# Patient Record
Sex: FEMALE | Race: WHITE | NOT HISPANIC OR LATINO | Employment: OTHER | URBAN - METROPOLITAN AREA
[De-identification: names, ages, dates, MRNs, and addresses within clinical notes are randomized per-mention and may not be internally consistent; named-entity substitution may affect disease eponyms.]

---

## 2017-02-20 ENCOUNTER — HISTORICAL (OUTPATIENT)
Dept: RADIOLOGY | Facility: HOSPITAL | Age: 77
End: 2017-02-20

## 2017-10-20 LAB
CHOLEST SERPL-MCNC: 295 MG/DL
HDLC SERPL-MCNC: 138 MG/DL (ref 35–60)
LDLC SERPL CALC-MCNC: 118 MG/DL
TRIGL SERPL-MCNC: 67 MG/DL (ref 30–150)

## 2017-11-08 LAB
BUN SERPL-MCNC: 13 MG/DL (ref 7–18)
CALCIUM SERPL-MCNC: 9.5 MG/DL (ref 9–10.9)
CHLORIDE SERPL-SCNC: 98 MMOL/L (ref 98–116)
CO2 SERPL-SCNC: 30 MMOL/L (ref 15–28)
CREAT SERPL-MCNC: 0.67 MG/DL (ref 0.6–1.3)
GLUCOSE SERPL-MCNC: 111 MG/DL (ref 74–106)
POTASSIUM SERPL-SCNC: 4 MMOL/L (ref 3.5–5.1)
SODIUM SERPL-SCNC: 131 MEQ/L (ref 131–145)

## 2018-03-05 ENCOUNTER — HISTORICAL (OUTPATIENT)
Dept: ADMINISTRATIVE | Facility: HOSPITAL | Age: 78
End: 2018-03-05

## 2018-04-20 ENCOUNTER — HISTORICAL (OUTPATIENT)
Dept: ADMINISTRATIVE | Facility: HOSPITAL | Age: 78
End: 2018-04-20

## 2018-04-20 LAB
APPEARANCE, UA: CLEAR
BACTERIA #/AREA URNS AUTO: ABNORMAL /HPF
BILIRUB UR QL STRIP: NEGATIVE MG/DL
COLOR UR: YELLOW
GLUCOSE (UA): NEGATIVE MG/DL
HGB UR QL STRIP: ABNORMAL UNIT/L
KETONES UR QL STRIP: NEGATIVE MG/DL
LEUKOCYTE ESTERASE UR QL STRIP: ABNORMAL UNIT/L
NITRITE UR QL STRIP.AUTO: NEGATIVE
PH UR STRIP: 7 [PH]
PROT UR QL STRIP: NEGATIVE MG/DL
RBC #/AREA URNS HPF: ABNORMAL /HPF
SP GR UR STRIP: 1.01
SQUAMOUS EPITHELIAL, UA: ABNORMAL /LPF
UROBILINOGEN UR STRIP-ACNC: 0.2 MG/DL
WBC #/AREA URNS AUTO: ABNORMAL /[HPF]

## 2018-10-26 ENCOUNTER — HISTORICAL (OUTPATIENT)
Dept: RADIOLOGY | Facility: HOSPITAL | Age: 78
End: 2018-10-26

## 2018-10-30 ENCOUNTER — HISTORICAL (OUTPATIENT)
Dept: LAB | Facility: HOSPITAL | Age: 78
End: 2018-10-30

## 2018-10-30 ENCOUNTER — HISTORICAL (OUTPATIENT)
Dept: ADMINISTRATIVE | Facility: HOSPITAL | Age: 78
End: 2018-10-30

## 2018-10-30 LAB
ABS NEUT (OLG): 4
ALBUMIN SERPL-MCNC: 4.3 GM/DL (ref 3.4–5)
ALBUMIN/GLOB SERPL: 1.26 {RATIO} (ref 1.5–2.5)
ALP SERPL-CCNC: 83 UNIT/L (ref 38–126)
ALT SERPL-CCNC: 17 UNIT/L (ref 7–52)
APPEARANCE, UA: ABNORMAL
AST SERPL-CCNC: 21 UNIT/L (ref 15–37)
BACTERIA #/AREA URNS AUTO: ABNORMAL /HPF
BILIRUB SERPL-MCNC: 0.7 MG/DL (ref 0.2–1)
BILIRUB UR QL STRIP: NEGATIVE MG/DL
BILIRUBIN DIRECT+TOT PNL SERPL-MCNC: 0.2 MG/DL (ref 0–0.5)
BILIRUBIN DIRECT+TOT PNL SERPL-MCNC: 0.5 MG/DL
BUN SERPL-MCNC: 13 MG/DL (ref 7–18)
CALCIUM SERPL-MCNC: 10 MG/DL (ref 8.5–10)
CHLORIDE SERPL-SCNC: 99 MMOL/L (ref 98–107)
CHOLEST SERPL-MCNC: 253 MG/DL (ref 0–200)
CHOLEST/HDLC SERPL: 2.3 {RATIO}
CO2 SERPL-SCNC: 30 MMOL/L (ref 21–32)
COLOR UR: YELLOW
CREAT SERPL-MCNC: 0.77 MG/DL (ref 0.6–1.3)
ERYTHROCYTE [DISTWIDTH] IN BLOOD BY AUTOMATED COUNT: 15.5 % (ref 11.5–17)
GLOBULIN SER-MCNC: 3.4 GM/DL (ref 1.2–3)
GLUCOSE (UA): NEGATIVE MG/DL
GLUCOSE SERPL-MCNC: 114 MG/DL (ref 74–106)
HCT VFR BLD AUTO: 41.7 % (ref 37–47)
HDLC SERPL-MCNC: 111 MG/DL (ref 35–60)
HGB BLD-MCNC: 13.3 GM/DL (ref 12–16)
HGB UR QL STRIP: NEGATIVE UNIT/L
KETONES UR QL STRIP: NEGATIVE MG/DL
LDLC SERPL CALC-MCNC: 110 MG/DL (ref 0–129)
LEUKOCYTE ESTERASE UR QL STRIP: ABNORMAL UNIT/L
LYMPHOCYTES # BLD AUTO: 2.2 X10(3)/MCL (ref 0.6–3.4)
LYMPHOCYTES NFR BLD AUTO: 30.8 % (ref 13–40)
MCH RBC QN AUTO: 29.2 PG (ref 27–31.2)
MCHC RBC AUTO-ENTMCNC: 32 GM/DL (ref 32–36)
MCV RBC AUTO: 92 FL (ref 80–94)
MONOCYTES # BLD AUTO: 1.1 X10(3)/MCL (ref 0–1.8)
MONOCYTES NFR BLD AUTO: 14.6 % (ref 0.1–24)
NEUTROPHILS NFR BLD AUTO: 54.6 % (ref 47–80)
NITRITE UR QL STRIP.AUTO: NEGATIVE
PH UR STRIP: 7 [PH]
PLATELET # BLD AUTO: 245 X10(3)/MCL (ref 130–400)
PMV BLD AUTO: 9.9 FL
POTASSIUM SERPL-SCNC: 4.5 MMOL/L (ref 3.5–5.1)
PROT SERPL-MCNC: 7.7 GM/DL (ref 6.4–8.2)
PROT UR QL STRIP: ABNORMAL MG/DL
RBC # BLD AUTO: 4.55 X10(6)/MCL (ref 4.2–5.4)
RBC #/AREA URNS HPF: ABNORMAL /HPF
SODIUM SERPL-SCNC: 134 MMOL/L (ref 136–145)
SP GR UR STRIP: 1.02
SQUAMOUS EPITHELIAL, UA: ABNORMAL /LPF
TRIGL SERPL-MCNC: 77 MG/DL (ref 30–150)
TSH SERPL-ACNC: 1.94 MIU/ML (ref 0.35–4.94)
UROBILINOGEN UR STRIP-ACNC: 0.2 MG/DL
VLDLC SERPL CALC-MCNC: 15.4 MG/DL
WBC # SPEC AUTO: 7.3 X10(3)/MCL (ref 4.5–11.5)
WBC #/AREA URNS AUTO: ABNORMAL /[HPF]

## 2018-11-01 LAB — FINAL CULTURE: NORMAL

## 2018-11-08 ENCOUNTER — HISTORICAL (OUTPATIENT)
Dept: ADMINISTRATIVE | Facility: HOSPITAL | Age: 78
End: 2018-11-08

## 2018-11-08 ENCOUNTER — HISTORICAL (OUTPATIENT)
Dept: LAB | Facility: HOSPITAL | Age: 78
End: 2018-11-08

## 2018-11-08 LAB
ABS NEUT (OLG): 3.7 X10(3)/MCL (ref 2.1–9.2)
APTT PPP: 27.6 SECOND(S) (ref 24.8–36.9)
BUN SERPL-MCNC: 14 MG/DL (ref 7–18)
CALCIUM SERPL-MCNC: 9.4 MG/DL (ref 8.5–10)
CHLORIDE SERPL-SCNC: 98 MMOL/L (ref 98–107)
CO2 SERPL-SCNC: 29 MMOL/L (ref 21–32)
CREAT SERPL-MCNC: 0.73 MG/DL (ref 0.6–1.3)
CREAT/UREA NIT SERPL: 19.2
ERYTHROCYTE [DISTWIDTH] IN BLOOD BY AUTOMATED COUNT: 15.4 % (ref 11.5–17)
GLUCOSE SERPL-MCNC: 108 MG/DL (ref 74–106)
HCT VFR BLD AUTO: 40.8 % (ref 37–47)
HGB BLD-MCNC: 13 GM/DL (ref 12–16)
INR PPP: 0.88 (ref 0–1.27)
LYMPHOCYTES # BLD AUTO: 2.1 X10(3)/MCL (ref 0.6–3.4)
LYMPHOCYTES NFR BLD AUTO: 32.8 % (ref 13–40)
MCH RBC QN AUTO: 29.5 PG (ref 27–31.2)
MCHC RBC AUTO-ENTMCNC: 32 GM/DL (ref 32–36)
MCV RBC AUTO: 92 FL (ref 80–94)
MONOCYTES # BLD AUTO: 0.7 X10(3)/MCL (ref 0.1–1.3)
MONOCYTES NFR BLD AUTO: 10.8 % (ref 0.1–24)
NEUTROPHILS NFR BLD AUTO: 56.4 % (ref 47–80)
PLATELET # BLD AUTO: 240 X10(3)/MCL (ref 130–400)
PLATELET # BLD AUTO: 243 X10(3)/MCL (ref 130–400)
PMV BLD AUTO: 9.9 FL (ref 9.4–12.4)
POTASSIUM SERPL-SCNC: 4.3 MMOL/L (ref 3.5–5.1)
PROTHROMBIN TIME: 12.2 SECOND(S) (ref 12.2–14.7)
RBC # BLD AUTO: 4.41 X10(6)/MCL (ref 4.2–5.4)
SODIUM SERPL-SCNC: 132 MMOL/L (ref 136–145)
WBC # SPEC AUTO: 6.5 X10(3)/MCL (ref 4.5–11.5)

## 2019-05-09 ENCOUNTER — HISTORICAL (OUTPATIENT)
Dept: ADMINISTRATIVE | Facility: HOSPITAL | Age: 79
End: 2019-05-09

## 2019-05-09 LAB
APPEARANCE, UA: ABNORMAL
BACTERIA #/AREA URNS AUTO: ABNORMAL /HPF
BILIRUB UR QL STRIP: ABNORMAL MG/DL
BUN SERPL-MCNC: 16 MG/DL (ref 7–18)
CALCIUM SERPL-MCNC: 10 MG/DL (ref 8.5–10)
CHLORIDE SERPL-SCNC: 101 MMOL/L (ref 98–107)
CO2 SERPL-SCNC: 30 MMOL/L (ref 21–32)
COLOR UR: YELLOW
CREAT SERPL-MCNC: 0.84 MG/DL (ref 0.6–1.3)
CREAT/UREA NIT SERPL: 19
GLUCOSE (UA): NEGATIVE MG/DL
GLUCOSE SERPL-MCNC: 114 MG/DL (ref 74–106)
HGB UR QL STRIP: NEGATIVE UNIT/L
KETONES UR QL STRIP: ABNORMAL MG/DL
LEUKOCYTE ESTERASE UR QL STRIP: ABNORMAL UNIT/L
NITRITE UR QL STRIP.AUTO: NEGATIVE
PH UR STRIP: 7.5 [PH]
POTASSIUM SERPL-SCNC: 4.5 MMOL/L (ref 3.5–5.1)
PROT UR QL STRIP: ABNORMAL MG/DL
RBC #/AREA URNS HPF: ABNORMAL /HPF
SODIUM SERPL-SCNC: 137 MMOL/L (ref 136–145)
SP GR UR STRIP: 1.01
SQUAMOUS EPITHELIAL, UA: ABNORMAL /LPF
UROBILINOGEN UR STRIP-ACNC: 0.2 MG/DL
WBC #/AREA URNS AUTO: ABNORMAL /[HPF]

## 2019-11-12 ENCOUNTER — HISTORICAL (OUTPATIENT)
Dept: ADMINISTRATIVE | Facility: HOSPITAL | Age: 79
End: 2019-11-12

## 2020-03-19 ENCOUNTER — HISTORICAL (OUTPATIENT)
Dept: ADMINISTRATIVE | Facility: HOSPITAL | Age: 80
End: 2020-03-19

## 2020-03-19 LAB
ABS NEUT (OLG): 3.5 X10(3)/MCL (ref 2.1–9.2)
ALBUMIN SERPL-MCNC: 4.4 GM/DL (ref 3.4–5)
ALBUMIN/GLOB SERPL: 1.52 {RATIO} (ref 1.5–2.5)
ALP SERPL-CCNC: 74 UNIT/L (ref 38–126)
ALT SERPL-CCNC: 20 UNIT/L (ref 7–52)
APPEARANCE, UA: CLEAR
AST SERPL-CCNC: 24 UNIT/L (ref 15–37)
BACTERIA #/AREA URNS AUTO: NORMAL /HPF
BILIRUB SERPL-MCNC: 0.6 MG/DL (ref 0.2–1)
BILIRUB UR QL STRIP: NEGATIVE MG/DL
BILIRUBIN DIRECT+TOT PNL SERPL-MCNC: 0.1 MG/DL (ref 0–0.5)
BILIRUBIN DIRECT+TOT PNL SERPL-MCNC: 0.5 MG/DL
BUN SERPL-MCNC: 16 MG/DL (ref 7–18)
CALCIUM SERPL-MCNC: 9.8 MG/DL (ref 8.5–10)
CHLORIDE SERPL-SCNC: 97 MMOL/L (ref 98–107)
CHOLEST SERPL-MCNC: 280 MG/DL (ref 0–200)
CHOLEST/HDLC SERPL: 2.4 {RATIO}
CO2 SERPL-SCNC: 31 MMOL/L (ref 21–32)
COLOR UR: YELLOW
CREAT SERPL-MCNC: 0.76 MG/DL (ref 0.6–1.3)
ERYTHROCYTE [DISTWIDTH] IN BLOOD BY AUTOMATED COUNT: 15.1 % (ref 11.5–17)
GLOBULIN SER-MCNC: 2.9 GM/DL (ref 1.2–3)
GLUCOSE (UA): NEGATIVE MG/DL
GLUCOSE SERPL-MCNC: 110 MG/DL (ref 74–106)
HCT VFR BLD AUTO: 38.3 % (ref 37–47)
HDLC SERPL-MCNC: 116 MG/DL (ref 35–60)
HGB BLD-MCNC: 12.1 GM/DL (ref 12–16)
HGB UR QL STRIP: NEGATIVE UNIT/L
KETONES UR QL STRIP: NEGATIVE MG/DL
LDLC SERPL CALC-MCNC: 133 MG/DL (ref 0–129)
LEUKOCYTE ESTERASE UR QL STRIP: NORMAL UNIT/L
LYMPHOCYTES # BLD AUTO: 2.1 X10(3)/MCL (ref 0.6–3.4)
LYMPHOCYTES NFR BLD AUTO: 31.4 % (ref 13–40)
MCH RBC QN AUTO: 26.5 PG (ref 27–31.2)
MCHC RBC AUTO-ENTMCNC: 32 GM/DL (ref 32–36)
MCV RBC AUTO: 84 FL (ref 80–94)
MONOCYTES # BLD AUTO: 1 X10(3)/MCL (ref 0.1–1.3)
MONOCYTES NFR BLD AUTO: 14.7 % (ref 0.1–24)
NEUTROPHILS NFR BLD AUTO: 53.9 % (ref 47–80)
NITRITE UR QL STRIP.AUTO: NEGATIVE
PH UR STRIP: 7 [PH]
PLATELET # BLD AUTO: 247 X10(3)/MCL (ref 130–400)
PMV BLD AUTO: 9.9 FL (ref 9.4–12.4)
POTASSIUM SERPL-SCNC: 4.6 MMOL/L (ref 3.5–5.1)
PROT SERPL-MCNC: 7.3 GM/DL (ref 6.4–8.2)
PROT UR QL STRIP: NEGATIVE MG/DL
RBC # BLD AUTO: 4.56 X10(6)/MCL (ref 4.2–5.4)
RBC #/AREA URNS HPF: NORMAL /HPF
SODIUM SERPL-SCNC: 134 MMOL/L (ref 136–145)
SP GR UR STRIP: 1.02
SQUAMOUS EPITHELIAL, UA: NORMAL /LPF
TRIGL SERPL-MCNC: 65 MG/DL (ref 30–150)
TSH SERPL-ACNC: 1.65 MIU/ML (ref 0.35–4.94)
UROBILINOGEN UR STRIP-ACNC: 0.2 MG/DL
VLDLC SERPL CALC-MCNC: 13 MG/DL
WBC # SPEC AUTO: 6.6 X10(3)/MCL (ref 4.5–11.5)
WBC #/AREA URNS AUTO: NORMAL /[HPF]

## 2020-05-13 ENCOUNTER — HISTORICAL (OUTPATIENT)
Dept: ADMINISTRATIVE | Facility: HOSPITAL | Age: 80
End: 2020-05-13

## 2020-06-24 ENCOUNTER — HISTORICAL (OUTPATIENT)
Dept: ADMINISTRATIVE | Facility: HOSPITAL | Age: 80
End: 2020-06-24

## 2020-09-21 ENCOUNTER — HISTORICAL (OUTPATIENT)
Dept: ADMINISTRATIVE | Facility: HOSPITAL | Age: 80
End: 2020-09-21

## 2020-09-23 ENCOUNTER — HISTORICAL (OUTPATIENT)
Dept: ADMINISTRATIVE | Facility: HOSPITAL | Age: 80
End: 2020-09-23

## 2020-09-23 LAB
APPEARANCE, UA: CLEAR
BACTERIA #/AREA URNS AUTO: ABNORMAL /HPF
BILIRUB UR QL STRIP: ABNORMAL MG/DL
BUN SERPL-MCNC: 12 MG/DL (ref 7–18)
CALCIUM SERPL-MCNC: 9.7 MG/DL (ref 8.5–10)
CHLORIDE SERPL-SCNC: 96 MMOL/L (ref 98–107)
CO2 SERPL-SCNC: 28 MMOL/L (ref 21–32)
COLOR UR: YELLOW
CREAT SERPL-MCNC: 0.69 MG/DL (ref 0.6–1.3)
CREAT/UREA NIT SERPL: 17.4
GLUCOSE (UA): NEGATIVE MG/DL
GLUCOSE SERPL-MCNC: 94 MG/DL (ref 74–106)
HGB UR QL STRIP: NEGATIVE UNIT/L
KETONES UR QL STRIP: ABNORMAL MG/DL
LEUKOCYTE ESTERASE UR QL STRIP: NEGATIVE UNIT/L
NITRITE UR QL STRIP.AUTO: NEGATIVE
PH UR STRIP: 8.5 [PH]
POTASSIUM SERPL-SCNC: 4.2 MMOL/L (ref 3.5–5.1)
PROT UR QL STRIP: NEGATIVE MG/DL
RBC #/AREA URNS HPF: ABNORMAL /HPF
SODIUM SERPL-SCNC: 133 MMOL/L (ref 136–145)
SP GR UR STRIP: 1.02
SQUAMOUS EPITHELIAL, UA: ABNORMAL /LPF
UROBILINOGEN UR STRIP-ACNC: 0.2 MG/DL
WBC #/AREA URNS AUTO: ABNORMAL /[HPF]

## 2020-11-11 ENCOUNTER — HISTORICAL (OUTPATIENT)
Dept: ADMINISTRATIVE | Facility: HOSPITAL | Age: 80
End: 2020-11-11

## 2021-03-01 ENCOUNTER — HISTORICAL (OUTPATIENT)
Dept: ADMINISTRATIVE | Facility: HOSPITAL | Age: 81
End: 2021-03-01

## 2021-03-23 ENCOUNTER — HISTORICAL (OUTPATIENT)
Dept: ADMINISTRATIVE | Facility: HOSPITAL | Age: 81
End: 2021-03-23

## 2021-03-23 LAB
ABS NEUT (OLG): 3.5 X10(3)/MCL (ref 2.1–9.2)
ALBUMIN SERPL-MCNC: 4.6 GM/DL (ref 3.4–5)
ALBUMIN/GLOB SERPL: 1.44 {RATIO} (ref 1.5–2.5)
ALP SERPL-CCNC: 80 UNIT/L (ref 38–126)
ALT SERPL-CCNC: 26 UNIT/L (ref 7–52)
APPEARANCE, UA: CLEAR
AST SERPL-CCNC: 31 UNIT/L (ref 15–37)
BACTERIA #/AREA URNS AUTO: NORMAL /HPF
BILIRUB SERPL-MCNC: 0.7 MG/DL (ref 0.2–1)
BILIRUB UR QL STRIP: NEGATIVE MG/DL
BILIRUBIN DIRECT+TOT PNL SERPL-MCNC: 0.2 MG/DL (ref 0–0.5)
BILIRUBIN DIRECT+TOT PNL SERPL-MCNC: 0.5 MG/DL
BUN SERPL-MCNC: 15 MG/DL (ref 7–18)
CALCIUM SERPL-MCNC: 10.4 MG/DL (ref 8.5–10)
CHLORIDE SERPL-SCNC: 98 MMOL/L (ref 98–107)
CHOLEST SERPL-MCNC: 266 MG/DL (ref 0–200)
CHOLEST/HDLC SERPL: 2.2 {RATIO}
CO2 SERPL-SCNC: 28 MMOL/L (ref 21–32)
COLOR UR: YELLOW
CREAT SERPL-MCNC: 0.7 MG/DL (ref 0.6–1.3)
ERYTHROCYTE [DISTWIDTH] IN BLOOD BY AUTOMATED COUNT: 14.1 % (ref 11.5–17)
GLOBULIN SER-MCNC: 3.2 GM/DL (ref 1.2–3)
GLUCOSE (UA): NEGATIVE MG/DL
GLUCOSE SERPL-MCNC: 106 MG/DL (ref 74–106)
HCT VFR BLD AUTO: 39.5 % (ref 37–47)
HDLC SERPL-MCNC: 122 MG/DL (ref 35–60)
HGB BLD-MCNC: 13.2 GM/DL (ref 12–16)
HGB UR QL STRIP: NEGATIVE UNIT/L
KETONES UR QL STRIP: NEGATIVE MG/DL
LDLC SERPL CALC-MCNC: 107 MG/DL (ref 0–129)
LEUKOCYTE ESTERASE UR QL STRIP: NEGATIVE UNIT/L
LYMPHOCYTES # BLD AUTO: 2.3 X10(3)/MCL (ref 0.6–3.4)
LYMPHOCYTES NFR BLD AUTO: 35.3 % (ref 13–40)
MCH RBC QN AUTO: 30.7 PG (ref 27–31.2)
MCHC RBC AUTO-ENTMCNC: 33 GM/DL (ref 32–36)
MCV RBC AUTO: 92 FL (ref 80–94)
MONOCYTES # BLD AUTO: 0.8 X10(3)/MCL (ref 0.1–1.3)
MONOCYTES NFR BLD AUTO: 11.8 % (ref 0.1–24)
NEUTROPHILS NFR BLD AUTO: 52.9 % (ref 47–80)
NITRITE UR QL STRIP.AUTO: NEGATIVE
PH UR STRIP: 7.5 [PH]
PLATELET # BLD AUTO: 213 X10(3)/MCL (ref 130–400)
PMV BLD AUTO: 10.2 FL (ref 9.4–12.4)
POTASSIUM SERPL-SCNC: 4.8 MMOL/L (ref 3.5–5.1)
PROT SERPL-MCNC: 7.8 GM/DL (ref 6.4–8.2)
PROT UR QL STRIP: NEGATIVE MG/DL
RBC # BLD AUTO: 4.3 X10(6)/MCL (ref 4.2–5.4)
RBC #/AREA URNS HPF: NORMAL /HPF
SODIUM SERPL-SCNC: 135 MMOL/L (ref 136–145)
SP GR UR STRIP: 1.02
SQUAMOUS EPITHELIAL, UA: NORMAL /LPF
TRIGL SERPL-MCNC: 94 MG/DL (ref 30–150)
TSH SERPL-ACNC: 1.91 MIU/ML (ref 0.35–4.94)
UROBILINOGEN UR STRIP-ACNC: 0.2 MG/DL
VLDLC SERPL CALC-MCNC: 18.8 MG/DL
WBC # SPEC AUTO: 6.6 X10(3)/MCL (ref 4.5–11.5)
WBC #/AREA URNS AUTO: NORMAL /[HPF]

## 2021-09-21 ENCOUNTER — HISTORICAL (OUTPATIENT)
Dept: ADMINISTRATIVE | Facility: HOSPITAL | Age: 81
End: 2021-09-21

## 2021-09-21 LAB
APPEARANCE, UA: CLEAR
BACTERIA #/AREA URNS AUTO: ABNORMAL /HPF
BILIRUB UR QL STRIP: ABNORMAL MG/DL
BUN SERPL-MCNC: 10 MG/DL (ref 7–18)
CALCIUM SERPL-MCNC: 9.6 MG/DL (ref 8.5–10)
CHLORIDE SERPL-SCNC: 98 MMOL/L (ref 98–107)
CO2 SERPL-SCNC: 30 MMOL/L (ref 21–32)
COLOR UR: YELLOW
CREAT SERPL-MCNC: 0.64 MG/DL (ref 0.6–1.3)
CREAT/UREA NIT SERPL: 15.6
EST CREAT CLEARANCE SER (OHS): 92.29 ML/MIN
GLUCOSE (UA): NEGATIVE MG/DL
GLUCOSE SERPL-MCNC: 108 MG/DL (ref 74–106)
HGB UR QL STRIP: NEGATIVE UNIT/L
KETONES UR QL STRIP: ABNORMAL MG/DL
LEUKOCYTE ESTERASE UR QL STRIP: NEGATIVE UNIT/L
NITRITE UR QL STRIP.AUTO: NEGATIVE
PH UR STRIP: 7.5 [PH]
POTASSIUM SERPL-SCNC: 5 MMOL/L (ref 3.5–5.1)
PROT UR QL STRIP: ABNORMAL MG/DL
RBC #/AREA URNS HPF: ABNORMAL /HPF
SODIUM SERPL-SCNC: 136 MMOL/L (ref 136–145)
SP GR UR STRIP: 1.02
SQUAMOUS EPITHELIAL, UA: ABNORMAL /LPF
UROBILINOGEN UR STRIP-ACNC: 0.2 MG/DL
WBC #/AREA URNS AUTO: ABNORMAL /[HPF]

## 2022-04-10 ENCOUNTER — HISTORICAL (OUTPATIENT)
Dept: ADMINISTRATIVE | Facility: HOSPITAL | Age: 82
End: 2022-04-10

## 2022-04-26 VITALS
HEIGHT: 62 IN | WEIGHT: 177.94 LBS | SYSTOLIC BLOOD PRESSURE: 142 MMHG | BODY MASS INDEX: 32.74 KG/M2 | DIASTOLIC BLOOD PRESSURE: 82 MMHG

## 2022-05-02 NOTE — HISTORICAL [facility] CERNER
This is a historical note converted from [employer]. Formatting and pictures may have been removed.  Please reference [facility] for original formatting and attached multimedia. Chief Complaint  wellness  History of Present Illness  The patient is a 80 year old white female here today for a complete Wellness physical.? The patient?has?no?no acute complaints today. The patient also carries a diagnosis of-multi-comorbid see list, which is assessed today.? Exercise is reported as minimal and is doing bike for lower body and some upper body exercises along with walking, no cp or SOB at this time with activity.?? Diet modifications are reported as?low-sodium/low-cholesterol.?? Previous documented weight is recorded? 181.  She is also?here in clinic for evaluation of hypertension.? The patient reports home blood pressures of 130s over 80s.? The patient reports 100% compliance with medication.? The patient reports no side effects with medication use.? The patient reports following a low-sodium diet.? The patient reports some cardiovascular exercise implementation.? There is no chest pain or shortness of breath reported with exercise.  She reports 100% compliance with her medications with no side effects?for her?osteoporosis and hyperlipidemia. ?Following low-cholesterol diet.? Attempting weightbearing exercise for bone health. She is still cautious with exercise as she had several major orthopedic surgeries last year, recovered well but still cautious.  ?   Dr. Saavedra- Ophthalmologist  Dr. Britt- Tank/C[place]  Review of Systems  Constitutional:?no weight gain,?no weight loss,?no fatigue,?no fever,?no chills,?no weakness,?no trouble sleeping.  Eyes:?no vision loss/changes,?no glasses or contacts,?no pain,?no redness,?no blurry or double vision,?no flashing lights,?no specks,?no glaucoma,?no cataracts.  Last eye exam:?within last year  Head:?no headache,?no head injury,?no neck pain.?  Neck:??no lumps,?no swollen glands,?no  stiffness.  Ears:?no decreased hearing,?no ringing,?no earache,?no drainage.?  Nose:?no stuffiness,?no discharge,?no itching,?no hay fever,?no nosebleeds,?no sinus pain.  Throat:?no bleeding,?no dentures,?no sore tongue,?no dry mouth,?no sore throat,?no hoarseness,?no thrush,?no non-healing sores.  Cardiovascular:?no chest pain or discomfort,?no tightness,?no palpitations,?no SOB with activity,?no difficulty breathing while supine,?no swelling,?no sudden awakening from sleep with SOB.  Vascular:?no calf pain with walking,?no leg cramping.  Respiratory:??no cough,?no sputum,?no coughing up blood,?no SOB,?no wheezing,?no painful breathing.  Gastrointestinal:?no swallowing difficulty,?no heartburn,?no change in appetite,?no nausea,?no change in bowel habits,?no rectal bleeding,?no constipation,?no diarrhea,?no yellow eyes or skin.  Urinary:?no frequency,?no urgency,?no burning or pain,?no blood in urine,?no incontinence,?no change in urinary strength.  Musculoskeletal:?no muscle or joint pain,?stiffness,?no back pain,?no redness of joints,?no swelling of joints,?no trauma.  Skin:?no rashes,?no lumps,?no itching,?no dryness,?color normal for ethnicity,?no hair or nail changes.  Neurologic:?no dizziness,?no fainting,?no seizures,?no weakness,?no numbness,?no tingling,?no tremors.  Psychiatric:?no nervousness,?no stress,?no depression,?no memory loss.- controlled with current meds, no s/h ideation  Endocrine:?no heat or cold intolerance,?no sweating,?no frequent urination,?no thirst,?no change in appetite.  Hematologic:?no ease of bruising,?no ease of bleeding.  ?  Physical Exam  Vitals & Measurements  BP:?132/80?  HT:?157?cm? HT:?157.00?cm? WT:?83.3?kg? WT:?83.300?kg? BMI:?33.79?  General- In NAD, A&O x 4  ?   Eye- PERRL, EOMI  ?   HENT- TM/EAC clear, Nose mucosa WNL, No D/C, No Sinus Tenderness, O/P without erythema or exudates?  ?   Neck- S, No LA, No Thyromegaly, No bruits, No JVD  ?   Respiratory- CTA, No wheezing,  No crackles, No rhonchi  ?   Cardiovascular- RRR W/O MGR, Pulses equal throughout  ?   Gastrointestinal- S, NT, No HSM, NABS, No masses, No peritoneal signs?  ?   Lymphatics- WNL  ?  Musculoskeletal- No tenderness, Joints WNL, FROM, Neg SLR, No CCE  ?  Integumentary- Warm, dry, intact, No lesions/rashes/hives  ?  Neurologic- No Motor/Sensory deficits, Reflexes +2 throughout, CN II-XII intact, Neg cerebellar tests  ?  Assessment/Plan  1.?Wellness examination?Z00.00  1.?Wellness  ?   -Health and Exercise Prescription issued and educated upon  ?   -10% weight loss goal  ?   -Lifestyle counseling >20minutes  ?   -Diet: Lean proteins and increased vegetable matter  ?   -Screening: UTD?per age?for colonoscopy and Pap; mammogram performed by her GYN and UTD  ?   -Vaccines: UTD Tdap/Prevnar/Pneumo/Flu- 2nd Covid vaccine tomorrow- she is due for Shingrix, we will fax over orders once she calls as she is in between covid series  ?   -Labs:?See below  ?  2.Comorbidities:?See below  ?  3. Referrals:?None  ?  4. RTC: 12-month wellness  Ordered:  Clinic Follow-up PRN, 03/23/21 9:27:00 CDT, [email or web address] - [facility], Future Order  Medicare Annual Wellness- Subsequent  PC, Wellness examination, [facility] - [facility], 03/23/21 9:27:00 CDT  ?  2.?Hyperlipidemia?E78.5  1. Statin medication is?efficacious with no side effects  2. Continue medication dosing with no change-12-month prescription given  3. Low-cholesterol diet?given and educated upon  4. FLP?[id number]  Ordered:  colesevelam, 1,875 mg = 3 tab(s), Oral, BID, # 540 tab(s), 3 Refill(s), Pharmacy: [facility]/pharmacy #[id number], 157, cm, Height/Length Dosing, 03/23/21 8:54:00 CDT, 83.3, kg, Weight Dosing, 03/23/21 8:54:00 CDT  Comprehensive Metabolic Panel, Routine collect, 03/23/21 9:34:00 CDT, Blood, Stop date 03/23/21 9:34:00 CDT, Lab Collect, Hyperlipidemia  Hypertension, 03/23/21 9:34:00 CDT  Lipid Panel, Routine collect, 03/23/21 9:34:00 CDT, Blood, Stop date 03/23/21 9:34:00 CDT, Lab  Collect, Hyperlipidemia, 03/23/21 9:34:00 CDT  ?  3.?Hypertension?I10  1. Refill meds x 6 months  2. Continue monitor Na+ intake in diet  3. Exercise as tolerated, diet modifications (TLC)  4. Follow OV in 6 months or CPX, sooner if needed  Ordered:  quinapril, 20 mg = 1 tab(s), Oral, BID, # 180 tab(s), 1 Refill(s), Pharmacy: [facility]/pharmacy #[id number], 157, cm, Height/Length Dosing, 03/23/21 8:54:00 CDT, 83.3, kg, Weight Dosing, 03/23/21 8:54:00 CDT  Automated Diff, Routine collect, 03/23/21 9:34:00 CDT, Blood, Collected, Stop date 03/23/21 9:34:00 CDT, Lab Collect, Hypertension, 03/23/21 9:34:00 CDT  CBC w/ Auto Diff, Routine collect, 03/23/21 9:34:00 CDT, Blood, Stop date 03/23/21 9:34:00 CDT, Lab Collect, Hypertension, 03/23/21 9:34:00 CDT  Clinic Follow up, *Est. 09/23/21 3:00:00 CDT, Order for future visit, Hypertension, [facility]  Comprehensive Metabolic Panel, Routine collect, 03/23/21 9:34:00 CDT, Blood, Stop date 03/23/21 9:34:00 CDT, Lab Collect, Hyperlipidemia  Hypertension, 03/23/21 9:34:00 CDT  Thyroid Stimulating Hormone, Routine collect, 03/23/21 9:34:00 CDT, Blood, Stop date 03/23/21 9:34:00 CDT, Lab Collect, Hypertension  Anxiety, 03/23/21 9:34:00 CDT  Urinalysis no Reflex, Routine collect, Urine, 03/23/21 9:34:00 CDT, Stop date 03/23/21 9:34:00 CDT, Nurse collect, Hypertension  ?  4.?Osteoporosis?M81.0  1. Continue ibandronate?12-month prescription given  2. Dexa due 9/22  3. Continue vitamin D and calcium supplementation  4. Continue weight resistance exercises  ?  5.?Anxiety?F41.9  1. Currently controlled with Escitalopram- refills x 1 year, tolerating well without any s/e, currently stable at todays visit  Ordered:  Thyroid Stimulating Hormone, Routine collect, 03/23/21 9:34:00 CDT, Blood, Stop date 03/23/21 9:34:00 CDT, Lab Collect, Hypertension  Anxiety, 03/23/21 9:34:00 CDT  ?  Orders:  amLODIPine, 10 mg = 1 tab(s), Oral, Daily, # 90 tab(s), 1 Refill(s), Pharmacy: [facility]/pharmacy #[id number], 157, cm,  Height/Length Dosing, 03/23/21 8:54:00 CDT, 83.3, kg, Weight Dosing, 03/23/21 8:54:00 CDT  aspirin, 81 mg, Oral, Daily, # 30 tab(s), 0 Refill(s), other reason (Rx)  escitalopram, See Instructions, TAKE 1 TABLET BY MOUTH EVERY DAY, # 90 tab(s), 3 Refill(s), [id number] 157, cm, Height/Length Dosing, 03/23/21 8:54:00 CDT, 83.3, kg, Weight Dosing, 03/23/21 8:54:00 CDT  hydrochlorothiazide-triamterene, See Instructions, TAKE 1/2 TABLET BY MOUTH EVERY DAY, # 45 tab(s), 3 Refill(s), Pharmacy: [facility]/pharmacy #[id number], 157, cm, Height/Length Dosing, 03/23/21 8:54:00 CDT, 83.3, kg, Weight Dosing, 03/23/21 8:54:00 CDT  ibandronate, See Instructions, TAKE 1 TABLET BY MOUTH EVERY MONTH, # 3 tab(s), 4 Refill(s), [id number], 157, cm, Height/Length Dosing, 03/23/21 8:54:00 CDT, 83.3, kg, Weight Dosing, 03/23/21 8:54:00 CDT  Referrals  Clinic Follow up, *Est. 09/21/21 9:15:00 CDT, Order for future visit, Hypertension, [facility]  Clinic Follow-up PRN, 03/23/21 9:27:00 CDT, [facility] - [facility], Future Order   Problem List/Past Medical History  Ongoing  Acid reflux  Hyperlipidemia  Hypertension  Obesity  Osteoarthritis  Osteoporosis  primary bilary cirrhosis  Primary osteoarthritis of left knee  Rheumatoid arthritis  Status post reverse arthroplasty of shoulder  Wellness examination  Historical  No qualifying data  Procedure/Surgical History  DXA BONE DENSITY STUDY 1+ SITS AXIAL SKEL (09/15/2020)  Replacement of Left Shoulder Joint with Reverse Ball and Socket Synthetic Substitute, Open Approach (04/03/2020)  Total Reverse Shoulder (Left) (04/03/2020)  Replacement of Right Shoulder Joint with Reverse Ball and Socket Synthetic Substitute, Open Approach (03/31/2020)  Total Reverse Shoulder (Right) (03/31/2020)  Repair Scalp Subcutaneous Tissue and Fascia, Percutaneous Approach (03/30/2020)  Fusion of 2 or more Cervical Vertebral Joints with Autologous Tissue Substitute, Posterior Approach, Posterior Column,  Open Approach (2018)  Fusion of 2 to 7 Thoracic Vertebral Joints with Autologous Tissue Substitute, Posterior Approach, Posterior Column, Open Approach (2018)  Fusion of Cervicothoracic Vertebral Joint with Autologous Tissue Substitute, Posterior Approach, Posterior Column, Open Approach (2018)  Posterior Cervical Fusion with O-arm (.) (2018)  Removal of Internal Fixation Device from Cervical Vertebra, Open Approach (2018)  Removal of Internal Fixation Device from Thoracic Vertebra, Open Approach (2018)  Procedure on spine (2018)  Replacement of Left Knee Joint with Synthetic Substitute, Cemented, Open Approach (2016)  Total Knee Arthroplasty (Left) (2016)  Bone density scan ()  Destruction of Left Lens, Percutaneous Approach (10/29/2015)  Discission of secondary membranous cataract (opacified posterior lens capsule and/or anterior hyaloid); laser surgery (eg, YAG laser) (1 or more stages) (10/29/2015)  Total knee replacement (2013)  Colonoscopy (2010)  Appendectomy;  arthoscopy - left knee    Cholecystectomy  cyst removed right hand  lumbar fusion  removal of catract bilatterally  right knee replacement  Tonsillectomy   Medications  Acidophilus Probiotic Blend, 10 billion cell, Oral, Daily  amLODIPine 10 mg oral tablet, 10 mg= 1 tab(s), Oral, Daily, 1 refills  ascorbic acid 500 mg oral tablet, 500 mg= 1 tab(s), Oral, At Bedtime  aspirin 81 mg oral tablet, CHEWABLE, 81 mg, Oral, Daily  Co-Q10, 1 tab, Oral, Daily  escitalopram 5 mg oral tablet, See Instructions, 3 refills  hydrochlorothiazide-triamterene 25 mg-37.5 mg oral tablet, See Instructions, 3 refills  ibandronate 150 mg oral tablet, See Instructions, 4 refills  Oystercal-D, 1 tab(s), Oral, BID  quinapril 20 mg oral tablet, 20 mg= 1 tab(s), Oral, BID, 1 refills  Louann 250 mg oral tablet, 1000 mg= 4 tab(s), Oral, BID  Welchol 625 mg oral tablet, 1875 mg= 3 tab(s), Oral, BID, 3  refills  Allergies  acetaminophen?(has primary cirrhosis)  Social History  Abuse/Neglect  No, 03/23/2021  No, 03/01/2021  Alcohol  Current, Liquor, Daily, 05/09/2016  Liquor, 07/30/2013  Substance Use - Denies Substance Abuse, 07/30/2013  Tobacco  Former smoker, quit more than 30 days ago, No, 03/23/2021  Former smoker, quit more than 30 days ago, No, 03/01/2021  Family History  Diabetes mellitus type 2: Sister.  Hypertension: Negative: Mother and Father.  Primary malignant neoplasm of colon: Grandfather.  Immunizations  Vaccine Date Status Comments   influenza virus vaccine, inactivated 10/01/2020 Given    influenza virus vaccine, inactivated - Not Given Already had disease     influenza virus vaccine, inactivated 11/12/2019 Given    influenza virus vaccine, inactivated 10/30/2018 Given    tetanus/diphtheria/pertussis, acel(Tdap) 10/30/2018 Given    influenza virus vaccine, inactivated 2017 Recorded    zoster vaccine live 12/29/2015 Recorded    pneumococcal 23-polyvalent vaccine 09/28/2015 Recorded    pneumococcal 13-valent conjugate vaccine 2014 Recorded    Health Maintenance  Health Maintenance  ???Pending?(in the next year)  ??? ??OverDue  ??? ? ? ?Hypertension Management-Education due??05/09/20??and every 1??year(s)  ??? ? ? ?Advance Directive due??01/02/21??and every 1??year(s)  ??? ? ? ?Cognitive Screening due??01/02/21??and every 1??year(s)  ??? ? ? ?Functional Assessment due??01/02/21??and every 1??year(s)  ??? ??Due?  ??? ? ? ?Zoster Vaccine due??03/23/21??Unknown Frequency  ??? ??Due In Future?  ??? ? ? ?ADL Screening not due until??04/15/21??and every 1??year(s)  ??? ? ? ?Depression Screening not due until??09/23/21??and every 1??year(s)  ??? ? ? ?Hypertension Management-BMP not due until??09/23/21??and every 1??year(s)  ??? ? ? ?Influenza Vaccine not due until??10/01/21??and every 1??day(s)  ??? ? ? ?Obesity Screening not due until??01/01/22??and every 1??year(s)  ??? ? ? ?Fall Risk Assessment not due  until??01/02/22??and every 1??year(s)  ???Satisfied?(in the past 1 year)  ??? ??Satisfied?  ??? ? ? ?ADL Screening on??04/15/20.??Satisfied by Sophie Crawford  ??? ? ? ?Aspirin Therapy for CVD Prevention on??03/23/21.??Satisfied by Mariana Back NP  ??? ? ? ?Blood Pressure Screening on??03/23/21.??Satisfied by Mariana Back NP  ??? ? ? ?Body Mass Index Check on??03/23/21.??Satisfied by Juan Thomas  ??? ? ? ?Depression Screening on??09/23/20.??Satisfied by Mariana Back NP  ??? ? ? ?Diabetes Screening on??09/23/20.??Satisfied by Noemi Gonzáles  ??? ? ? ?Fall Risk Assessment on??03/01/21.??Satisfied by Kacie Donald LPN  ??? ? ? ?Functional Assessment on??11/11/20.??Satisfied by Juan Perez  ??? ? ? ?Hypertension Management-Blood Pressure on??03/23/21.??Satisfied by Mariana Bakc NP  ??? ? ? ?Influenza Vaccine on??03/01/21.??Satisfied by Kacie Donald LPN  ??? ? ? ?Medicare Annual Wellness Exam on??03/23/21.??Satisfied by Mariana Back NP  ??? ? ? ?Obesity Screening on??03/23/21.??Satisfied by Juan Thomas  ??? ??Refused?  ??? ? ? ?Zoster Vaccine on??03/23/21.??Recorded by Mariana Back NP??Reason: Patient Refuses  ?      The?physician?is present within?the office with the?nurse practitioner.??The?office visit?documentation and management have been?reviewed and agreed upon.? I will continue to follow?this patient along with?the nurse practitioner?for current and future care.

## 2022-05-02 NOTE — HISTORICAL [facility]
This is a historical note converted from Kelly. Formatting and pictures may have been removed.  Please reference Kelly for original formatting and attached multimedia. Chief Complaint  10 month s/p right rev TSA 4-3-20 and left rev TSA 3-31-20 w/xrays..states her right shoulder is still bothering her..it hurts the most when she pulls it back  History of Present Illness  3/31/2020: Right reverse total shoulder arthroplasty  4/3/2020: Left reverse total shoulder arthroplasty  ?  She returns today. Denies left shoulder pain. States right shoulder pain is getting better. Completed PT, doing home exercises daily. Taking Advil intermittently.  Review of Systems  Comprehensive review of system was performed with no exceptions other than noted in the history of present illness  Physical Exam  Vitals & Measurements  T:?36.4? ?C (Oral)? HR:?80(Apical)? BP:?137/77?  HT:?157.00?cm? WT:?82.550?kg? BMI:?33.49?  Gen: WN, WD, NAD  Card/Res: NL breathing, +distal pulses  Abdomen: ND  Shoulder Exam:???????? ???Right??????????Left  Skin:??????????????????????????? ???Normal???????Normal  AC joint tenderness:????????None ? ? ? ? ? ? ??none  Forward Flexion:???????????? ?140??????? ????140  Abduction:?????????????????????? 100??????????? 100  External Rotation:?????????????40??????????????40  Internal Rotation???????????? ? 80???????????? 80  Supraspinatus stress test:??Neg??????? ???Neg  Acosta Impingement:?????+?? ?????????? Neg  Neer Impingement:???????????+?? ?????????? Neg  Apprehension:????????????????? Neg????? ?????Neg  OBriens:?????????????????????????+????????????? Neg  Speeds test:??????????????????? Neg?????? ????Neg  Strength:  External Rotation:?????????????5/5?????????????5/5  Lift Off/belly press:????????????5/5????????????5/5  ?   N-V status:??????????????????????Intact??????????Intact  ?   C-spine: Normal ROM, NT  Assessment/Plan  Status post reverse total replacement of left shoulder?Z96.612  ?Gradually improving  status post above.? Continue home exercise program. ?I will see her back in 1 year with radiographs of bilateral shoulders  Ordered:  Clinic Follow up, *Est. 03/01/22 3:00:00 CST, Order for future visit, Status post reverse total replacement of left shoulder  Status post reverse total replacement of right shoulder, Orthopaedics  Office/Outpatient Visit Level 3 Established [id number] PC, Status post reverse total replacement of left shoulder  Status post reverse total replacement of right shoulder, Orthopaedics Clinic, 03/01/21 10:23:00 CST  XR Shoulder Left Minimum 2 Views, Routine, 03/01/21 10:08:00 CST, None, Patient Bed, Patient Has IV?, Rad Type, Status post reverse total replacement of left shoulder, Not Scheduled, 03/01/21 10:08:00 CST  ?  Status post reverse total replacement of right shoulder?Z96.611  Ordered:  Clinic Follow up, *Est. 03/01/22 3:00:00 CST, Order for future visit, Status post reverse total replacement of left shoulder  [facility]  Office/Outpatient Visit Level 3 Established [id number] PC, Status post reverse total replacement of left shoulder  Status post reverse total replacement of right shoulder, Orthopaedics Clinic, 03/01/21 10:23:00 CST  XR Shoulder Right Minimum 2 Views, Routine, 03/01/21 10:07:00 CST, None, Patient Bed, Patient Has IV?, Rad Type, Status post reverse total replacement of right shoulder, Not Scheduled, 03/01/21 10:07:00 CST  ?  Referrals  Clinic Follow up, *Est. 03/01/22 3:00:00 CST, Order for future visit, Status post reverse total replacement of left shoulder  [facility]   Problem List/Past Medical History  Ongoing  Acid reflux  Hyperlipidemia  Hypertension  Obesity  Osteoarthritis  Osteoporosis  primary bilary cirrhosis  Primary osteoarthritis of left knee  Rheumatoid arthritis  Status post reverse arthroplasty of shoulder  Wellness  examination  Historical  No qualifying data  Procedure/Surgical History  DXA BONE DENSITY STUDY 1+ SITS AXIAL SKEL (09/15/2020)  Replacement of Left Shoulder Joint with Reverse Ball and Socket Synthetic Substitute, Open Approach (2020)  Total Reverse Shoulder (Left) (2020)  Replacement of Right Shoulder Joint with Reverse Ball and Socket Synthetic Substitute, Open Approach (2020)  Total Reverse Shoulder (Right) (2020)  Repair Scalp Subcutaneous Tissue and Fascia, Percutaneous Approach (2020)  Fusion of 2 or more Cervical Vertebral Joints with Autologous Tissue Substitute, Posterior Approach, Posterior Column, Open Approach (2018)  Fusion of 2 to 7 Thoracic Vertebral Joints with Autologous Tissue Substitute, Posterior Approach, Posterior Column, Open Approach (2018)  Fusion of Cervicothoracic Vertebral Joint with Autologous Tissue Substitute, Posterior Approach, Posterior Column, Open Approach (2018)  Posterior Cervical Fusion with O-arm (.) (2018)  Removal of Internal Fixation Device from Cervical Vertebra, Open Approach (2018)  Removal of Internal Fixation Device from Thoracic Vertebra, Open Approach (2018)  Procedure on spine (2018)  Replacement of Left Knee Joint with Synthetic Substitute, Cemented, Open Approach (2016)  Total Knee Arthroplasty (Left) (2016)  Bone density scan (2016)  Destruction of Left Lens, Percutaneous Approach (10/29/2015)  Discission of secondary membranous cataract (opacified posterior lens capsule and/or anterior hyaloid); laser surgery (eg, YAG laser) (1 or more stages) (10/29/2015)  Total knee replacement (2013)  Colonoscopy (2010)  Appendectomy;  arthoscopy - left knee    Cholecystectomy  cyst removed right hand  lumbar fusion  removal of catract bilatterally  right knee replacement  Tonsillectomy   Medications  acetaminophen-hydrocodone 325 mg-5 mg oral tablet, 1 tab(s), Oral,  q6hr, PRN  Acidophilus Probiotic Blend, 10 billion cell, Oral, Daily  amLODIPine 10 mg oral tablet, 10 mg= 1 tab(s), Oral, Daily, 1 refills  ascorbic acid 500 mg oral tablet, 500 mg= 1 tab(s), Oral, At Bedtime  aspirin 81 mg oral tablet, 81 mg= 1 tab(s), Oral, Daily  Co-Q10, 1 tab, Oral, Daily  escitalopram 5 mg oral tablet, 5 mg= 1 tab(s), Oral, Daily, 5 refills  hydrochlorothiazide-triamterene 25 mg-37.5 mg oral tablet, See Instructions  ibandronate 150 mg oral tablet, See Instructions  methocarbamol 500 mg oral tablet, 500 mg= 1 tab(s), Oral, TID  Neurontin 300 mg oral capsule, 300 mg= 1 cap(s), Oral, At Bedtime  Oystercal-D, 1 tab(s), Oral, BID  polyethylene glycol 3350 oral powder for reconstitution, Oral, Daily  quinapril 20 mg oral tablet, 20 mg= 1 tab(s), Oral, BID, 1 refills  Senokot S 50 mg-8.6 mg oral tablet, 2 tab(s), Oral, BID  tiZANidine 4 mg oral tablet, 4 mg= 1 tab(s), Oral, q8hr  Ultram 50 mg oral tablet, 50 mg= 1 tab(s), Oral, q6hr, PRN  Louann 250 mg oral tablet, 1000 mg= 4 tab(s), Oral, BID  Welchol 625 mg oral tablet, 1875 mg= 3 tab(s), Oral, BID, 3 refills  Allergies  acetaminophen?(has primary cirrhosis)  Social History  Abuse/Neglect  No, 03/01/2021  Alcohol  Current, Liquor, Daily, 05/09/2016  Liquor, 07/30/2013  Substance Use - Denies Substance Abuse, 07/30/2013  Tobacco  Former smoker, quit more than 30 days ago, No, 03/01/2021  Family History  Diabetes mellitus type 2: Sister.  Hypertension: Negative: Mother and Father.  Primary malignant neoplasm of colon: Grandfather.  Immunizations  Vaccine Date Status Comments   influenza virus vaccine, inactivated 10/01/2020 Given    influenza virus vaccine, inactivated - Not Given Already had disease     influenza virus vaccine, inactivated 11/12/2019 Given    influenza virus vaccine, inactivated 10/30/2018 Given    tetanus/diphtheria/pertussis, acel(Tdap) 10/30/2018 Given    influenza virus vaccine, inactivated 2017 Recorded    zoster vaccine live  12/29/2015 Recorded    pneumococcal 23-polyvalent vaccine 09/28/2015 Recorded    pneumococcal 13-valent conjugate vaccine 2014 Recorded    Health Maintenance  Health Maintenance  ???Pending?(in the next year)  ??? ??OverDue  ??? ? ? ?Hypertension Management-Education due??05/09/20??and every 1??year(s)  ??? ? ? ?Advance Directive due??01/02/21??and every 1??year(s)  ??? ? ? ?Cognitive Screening due??01/02/21??and every 1??year(s)  ??? ? ? ?Functional Assessment due??01/02/21??and every 1??year(s)  ??? ??Due?  ??? ? ? ?Zoster Vaccine due??03/01/21??Unknown Frequency  ??? ??Due In Future?  ??? ? ? ?Medicare Annual Wellness Exam not due until??03/19/21??and every 1??year(s)  ??? ? ? ?Aspirin Therapy for CVD Prevention not due until??04/06/21??and every 1??year(s)  ??? ? ? ?ADL Screening not due until??04/15/21??and every 1??year(s)  ??? ? ? ?Depression Screening not due until??09/23/21??and every 1??year(s)  ??? ? ? ?Hypertension Management-BMP not due until??09/23/21??and every 1??year(s)  ??? ? ? ?Influenza Vaccine not due until??10/01/21??and every 1??day(s)  ??? ? ? ?Obesity Screening not due until??01/01/22??and every 1??year(s)  ??? ? ? ?Fall Risk Assessment not due until??01/02/22??and every 1??year(s)  ???Satisfied?(in the past 1 year)  ??? ??Satisfied?  ??? ? ? ?ADL Screening on??04/15/20.??Satisfied by Sophie Crawford  ??? ? ? ?Aspirin Therapy for CVD Prevention on??04/06/20.??Satisfied by Luly Wahl RN  ??? ? ? ?Blood Pressure Screening on??03/01/21.??Satisfied by Kacie Donald  ??? ? ? ?Body Mass Index Check on??03/01/21.??Satisfied by Kacie Donald  ??? ? ? ?Depression Screening on??09/23/20.??Satisfied by Mariana Back NP  ??? ? ? ?Diabetes Maintenance-Fasting Lipid Profile on??03/19/20.??Satisfied by Noemi Gonzáles  ??? ? ? ?Diabetes Screening on??09/23/20.??Satisfied by Noemi Gonzáles  ??? ? ? ?Fall Risk Assessment on??03/01/21.??Satisfied by Kacie Donald  ??? ? ?  ?Functional Assessment on??11/11/20.??Satisfied by Juan Perez  ??? ? ? ?Hypertension Management-Blood Pressure on??03/01/21.??Satisfied by Kacie Donald  ??? ? ? ?Influenza Vaccine on??03/01/21.??Satisfied by Kacie Donald  ??? ? ? ?Lipid Screening on??03/19/20.??Satisfied by Noemi Gonzáles  ??? ? ? ?Medicare Annual Wellness Exam on??03/19/20.??Satisfied by Jos Briseno MD  ??? ? ? ?Obesity Screening on??03/01/21.??Satisfied by Kacie Donald  ?  Diagnostic Results  Bilateral shoulder radiographs show appropriate implant position     [0] Office Visit Note; Balwinder LAWSON MD, Chadwick BARRAGAN 11/11/2020 12:02 CST

## 2022-05-02 NOTE — HISTORICAL [facility]
This is a historical note converted from [employer]. Formatting and pictures may have been removed.  Please reference [employer] for original formatting and attached multimedia. Chief Complaint  Wellness (fasting) wants flu shot  History of Present Illness  The patient is a?78 year old female here today for a complete Wellness Physical exam. The patient has?no acute complaints today. The patient also carries a diagnosis of HTN/Osteoporosis/OA/Primary bilary cirrhosis (followed by GI)?which is assessed today. Exercise is reported as?minimal and includes was walking and riding stationary bike, however due to neck pain and screws loose, awaiting today apt to see what next plan is. Monitors diet on a daily basis. Previous documented weight at last office visit is 179.? Overall feeling well, seeing Dr. Lamas for recent cervical surgery. Dr. Lozano for mammograms, states should be do soon, desired 2 year screening, due for Dexa this year.  Review of Systems  Constitutional:?no weight gain,?no weight loss,?no fatigue,?no fever,?no chills,?no weakness,?no trouble sleeping.  Eyes:?no vision loss/changes,?no glasses or contacts,?no pain,?no redness,?no blurry or double vision,?no flashing lights,?no specks,?no glaucoma,?no cataracts.  Last eye exam:?within last year  Head:?no headache,?no head injury,?no neck pain.?  Neck:??no lumps,?no swollen glands,?stiffness.- due to wearing a neck brace from surgery in June  Ears:?no decreased hearing,?no ringing,?no earache,?no drainage.?  Nose:?no stuffiness,?no discharge,?no itching,?no hay fever,?no nosebleeds,?no sinus pain.  Throat:?no bleeding,?no dentures,?no sore tongue,?no dry mouth,?no sore throat,?no hoarseness,?no thrush,?no non-healing sores.  Cardiovascular:?no chest pain or discomfort,?no tightness,?no palpitations,?no SOB with activity,?no difficulty breathing while supine,?no swelling,?no sudden awakening from sleep with SOB.  Vascular:?no calf pain with walking,?no leg  cramping.  Respiratory:??no cough,?no sputum,?no coughing up blood,?no SOB,?no wheezing,?no painful breathing.  Gastrointestinal:?no swallowing difficulty,?heartburn,- monitors diet?no change in appetite,?no nausea,?no change in bowel habits,?no rectal bleeding,?no constipation,?no diarrhea,?no yellow eyes or skin.Primary Biliary Cirrhosis  Urinary:?no frequency,?no urgency,?no burning or pain,?no blood in urine,?no incontinence,?no change in urinary strength.  Musculoskeletal:?no muscle or joint pain,?stiffness,?no back pain,?no redness of joints,?no swelling of joints,?no trauma.  Skin:?no rashes,?no lumps,?no itching,?no dryness,?color normal for ethnicity,?no hair or nail changes.  Neurologic:?no dizziness,?no fainting,?no seizures,?no weakness,?no numbness,?no tingling,?no tremors.  Psychiatric:?no nervousness,?no stress,?no depression,?no memory loss.  Endocrine:?no heat or cold intolerance,?no sweating,?no frequent urination,?no thirst,?no change in appetite.  Hematologic:?no ease of bruising,?no ease of bleeding.  Physical Exam  Vitals & Measurements  HR:?72(Peripheral)? BP:?132/88?  HT:?152?cm? HT:?152?cm? WT:?81.5?kg? WT:?81.5?kg? BMI:?35.28?  General- In NAD, A&O x 4  ?   Eye- PERRL, EOMI  ?   HENT- TM/EAC clear, Nose mucosa WNL, No D/C, No Sinus Tenderness, O/P without erythema or exudates?  ?   Neck- S, No LA, No Thyromegaly, No bruits, No JVD  ?   Respiratory- CTA, No wheezing, No crackles, No rhonchi  ?   Cardiovascular- RRR W/O MGR, Pulses equal throughout  ?   Gastrointestinal- S, NT, No HSM, NABS, No masses, No peritoneal signs?  ?   Lymphatics- WNL  ?  Musculoskeletal- No tenderness, Joints WNL, FROM, Neg SLR, No CCE- cervical brace noted  ?  Integumentary- Warm, dry, intact, No lesions/rashes/hives  ?  Neurologic- No Motor/Sensory deficits, Reflexes +2 throughout, CN II-XII intact, Neg cerebellar tests  ?  ?  Assessment/Plan  1.?Wellness examination  ?1. Wellness  - Health and Exercise?educated  upon  -10% weight loss goal  -Lifestyle counseling > 20 minutes  -Diet:  -Screening: Due for Mammo with GYN, will schedule DEXA, however wishes to have done in Dec? due to possible upcoming surgery  -Vaccines: Prevnar/Pneumo today, will inquire about Shingrix at local pharmacy  -LabS: CBC, CMP, TSH, LIPIDS, UA  ?   2. Comorbidities- mentioned  ?   3. Referrals- none at present  ?   4. RTC- 6 months sooner if needed  Ordered:  CBC w/ Auto Diff, Routine collect, 10/30/18 9:31:00 CDT, Blood, Order for future visit, Stop date 10/30/18 9:31:00 CDT, Lab Collect, HTN (hypertension)  Wellness examination, 10/30/18 9:31:00 CDT  Comprehensive Metabolic Panel, Routine collect, 10/30/18 9:31:00 CDT, Blood, Order for future visit, Stop date 10/30/18 9:31:00 CDT, Lab Collect, Wellness examination  Hyperlipidemia  HTN (hypertension), 10/30/18 9:31:00 CDT  Lipid Panel, Routine collect, 10/30/18 9:31:00 CDT, Blood, Order for future visit, Stop date 10/30/18 9:31:00 CDT, Lab Collect, Hyperlipidemia  Wellness examination, 10/30/18 9:31:00 CDT  Preventative Health Care Est 65 yrs and over [id number] PC, Wellness examination, [facility], 10/30/18 9:31:00 CDT  Thyroid Stimulating Hormone, Routine collect, 10/30/18 9:31:00 CDT, Blood, Order for future visit, Stop date 10/30/18 9:31:00 CDT, Lab Collect, HTN (hypertension)  Wellness examination, 10/30/18 9:31:00 CDT  Urinalysis Complete no reflex, Routine collect, Urine, Order for future visit, 10/30/18 9:31:00 CDT, Stop date 10/30/18 9:31:00 CDT, Nurse collect, HTN (hypertension)  Wellness examination  ?  2.?Hyperlipidemia  1. Refill meds  2. Diet and exercise as tolerated (TLC)  Ordered:  Comprehensive Metabolic Panel, Routine collect, 10/30/18 9:31:00 CDT, Blood, Order for future visit, Stop date 10/30/18 9:31:00 CDT, Lab Collect, Wellness examination  Hyperlipidemia  HTN (hypertension), 10/30/18 9:31:00 CDT  Lipid Panel, Routine collect, 10/30/18 9:31:00 CDT, Blood, Order for  future visit, Stop date 10/30/18 9:31:00 CDT, Lab Collect, Hyperlipidemia  Wellness examination, 10/30/18 9:31:00 CDT  ?  3.?Acid reflux  1. Continue to monitor diet  2. OTC meds as directed if needed, at present controlled  ?  4.?HTN (hypertension),?  1. Refill meds x 6 months  2. Continue monitor Na+ intake in diet  3. Exercise as tolerated, diet modifications (TLC)  4. Follow OV in 6 months or CPX, sooner if needed  Hypertension  Ordered:  hydrochlorothiazide-triamterene, 0.5 tab(s), Oral, Daily, # 45 tab(s), 1 Refill(s), Pharmacy: [id number]  CBC w/ Auto Diff, Routine collect, 10/30/18 9:31:00 CDT, Blood, Order for future visit, Stop date 10/30/18 9:31:00 CDT, Lab Collect, HTN (hypertension)  Wellness examination, 10/30/18 9:31:00 CDT  Clinic Follow up, *Est. 04/30/19 3:00:00 CDT, Order for future visit, HTN (hypertension), [facility]  Comprehensive Metabolic Panel, Routine collect, 10/30/18 9:31:00 CDT, Blood, Order for future visit, Stop date 10/30/18 9:31:00 CDT, Lab Collect, Wellness examination  Hyperlipidemia  HTN (hypertension), 10/30/18 9:31:00 CDT  Lab Collection Request, 10/30/18 9:31:00 CDT, [facility], 10/30/18 9:31:00 CDT  Thyroid Stimulating Hormone, Routine collect, 10/30/18 9:31:00 CDT, Blood, Order for future visit, Stop date 10/30/18 9:31:00 CDT, Lab Collect, HTN (hypertension)  Wellness examination, 10/30/18 9:31:00 CDT  Urinalysis Complete no reflex, Routine collect, Urine, Order for future visit, 10/30/18 9:31:00 CDT, Stop date 10/30/18 9:31:00 CDT, Nurse collect, HTN (hypertension)  Wellness examination  ?  5.?Osteoporosis  1. DEXA due, will schedule- wishes to have in Dec due to possible up coming surgery  2. Continue Boniva, weight bearing exercises as tolerated  Ordered:  Schedule Diagnostics Study, DEXA, no Oral Contrast Requested, would like scheduled in Dec due to upcoming surgery, 10/30/18 9:34:00 CDT, Osteoporosis  ?  Need for vaccination  1. TDap  2. HD Flu  today  Ordered:  Influenza Virus Vaccine, Inactivated, 0.5 mL, IM, Once-Unscheduled, first dose 10/30/18 9:30:00 CDT  tetanus/diphth/pertuss (Tdap) adult/adol, 0.5 mL, IM, Once-Unscheduled, first dose 10/30/18 9:30:00 CDT  ?  Orders:  amLODIPine, 10 mg = 1 tab(s), Oral, Daily, # 90 tab(s), 1 Refill(s), Pharmacy: [facility] #[id number]  colesevelam, 1,875 mg = 3 tab(s), Oral, BID, # 540 tab(s), 3 Refill(s), Pharmacy: [facility] #[id number]  ibandronate, 150 mg = 1 tab(s), Oral, qMonth, # 3 tab(s), 3 Refill(s), Pharmacy: [facility] #[id number]  quinapril, See Instructions, TAKE 1 TABLET BY MOUTH TWICE A DAY, # 180 tab(s), 1 Refill(s), Pharmacy: [facility] #[id number]  Clinic Follow-up PRN, 10/30/18 9:31:00 CDT, [facility], Future Order   Problem List/Past Medical History  Ongoing  Acid reflux  Hyperlipidemia  Obesity  Osteoarthritis  Osteoporosis  primary bilary cirrhosis  Primary osteoarthritis of left knee  Rheumatoid arthritis  Wellness examination  Historical  Hypertension  Procedure/Surgical History  Procedure on spine (2018)  Replacement of Left Knee Joint with Synthetic Substitute, Cemented, Open Approach (2016)  Total Knee Arthroplasty (Left) (2016)  Bone density scan (2016)  Destruction of Left Lens, Percutaneous Approach (10/29/2015)  Discission of secondary membranous cataract (opacified posterior lens capsule and/or anterior hyaloid); laser surgery (eg, YAG laser) (1 or more stages) (10/29/2015)  Total knee replacement (2013)  Colonoscopy (2010)  Appendectomy;  arthoscopy - left knee    Cholecystectomy  cyst removed right hand  lumbar fusion  removal of catract bilatterally  right knee replacement  Tonsillectomy   Medications  Adult High-Dose Influenza Vaccine, 0.5 mL, IM, Once-Unscheduled  amLODIPine 10 mg oral tablet, 10 mg= 1 tab(s), Oral, Daily, 1 refills  aspirin 81 mg oral tablet, 81 mg= 1 tab(s), Oral, Daily  Boniva 150 mg oral tablet, 150 mg= 1 tab(s), Oral, qMonth, 3  refills  Co-Q10, Oral  diclofenac sodium 75 mg oral delayed release tablet, 75 mg= 1 tab(s), Oral, BID, PRN, 2 refills,? ?Not taking  Ecotrin 325 mg oral enteric coated tablet, 325 mg= 1 tab(s), Oral, Daily,? ?Not taking  hydrochlorothiazide-triamterene 25 mg-37.5 mg oral tablet, 0.5 tab(s), Oral, Daily, 1 refills  Phenergan DM, 1 tsp, Oral, q6hr, PRN,? ?Not taking  quinapril 20 mg oral tablet, See Instructions, 1 refills  tetanus/diphth/pertuss (Tdap) adult/adol 5 units-2.5 units-18.5 mcg/0.5 mL intramuscular suspension, 0.5 mL, IM, Once-Unscheduled  Louann 250 mg oral tablet, 1 1/2 tab(s), Oral, TID  Welchol 625 mg oral tablet, 1875 mg= 3 tab(s), Oral, BID, 3 refills  Allergies  acetaminophen?(has primary cirrhosis)  Social History  Alcohol  Current, Liquor, Daily, 05/09/2016  Liquor, 07/30/2013  Substance Abuse - Denies Substance Abuse, 07/30/2013  Tobacco  Former smoker, N/A, 10/30/2018  Cigarettes, 07/30/2013  Family History  Diabetes mellitus type 2: Sister.  Hypertension: Negative: Mother and Father.  Primary malignant neoplasm of colon: Grandfather.  Immunizations  Vaccine Date Status   influenza virus vaccine, inactivated 2017 Recorded   zoster vaccine live 12/29/2015 Recorded   pneumococcal 23-polyvalent vaccine 09/28/2015 Recorded   pneumococcal 13-valent conjugate vaccine 2014 Recorded       The?physician?is present within?the office with the?nurse practitioner.??The?office visit?documentation and management have been?reviewed and agreed upon.? I will continue to follow?this patient along with?the nurse practitioner?for current and future care.

## 2022-05-02 NOTE — HISTORICAL [facility] CERNER
This is a historical note converted from Kelly. Formatting and pictures may have been removed.  Please reference Kelly for original formatting and attached multimedia. Chief Complaint  6MTH HTN OV  History of Present Illness  The patient is a 76yo white female.? ?Here in clinic for evaluation of hypertension.? The patient reports home blood pressures of 137 over 70s.? The patient reports 100% compliance with medication.? The patient reports no side effects with medication use.? The patient reports following a low-sodium diet.? The patient reports some cardiovascular exercise implementation.? There is no chest pain or shortness of breath reported with exercise.  She complains of a noted bulge inferior to her umbilicus?that?presented after?a?URI?with?increased Valsalva/coughing?episodes. ?The area is reducible in supine/no erythema/no tenderness/no fever chills.  She also carries a diagnosis of?rheumatoid and osteoarthritis?which is?not changing today. ?She is following up with?PM and R for epidural steroid injection in 1 week.? She also carries a diagnosis of?HLD-which is stable on medication currently continue.? She carries a diagnosis of?osteoporosis?which she is taking her Boniva?100% compliance  Review of Systems  Constitutional:?no weight gain,?no weight loss,?no fatigue,?no fever,?no chills,?no weakness,?no trouble sleeping.  Eyes:?no vision loss/changes,?no glasses or contacts,?no pain,?no redness,?no blurry or double vision,?no flashing lights,?no specks,?no glaucoma,?no cataracts.  Last eye exam:?within last 6 months  Head:?no headache,?no head injury,?no neck pain.?  Neck:??no lumps,?no swollen glands,?no stiffness.  Ears:?no decreased hearing,?no ringing,?no earache,?no drainage.?  Nose:?no stuffiness,?no discharge,?no itching,?no hay fever,?no nosebleeds,?no sinus pain.  Throat:?no bleeding,?no dentures,?no sore tongue,?no dry mouth,?no sore throat,?no hoarseness,?no thrush,?no non-healing  sores.  Cardiovascular:?no chest pain or discomfort,?no tightness,?no palpitations,?no SOB with activity,?no difficulty breathing while supine,?no swelling,?no sudden awakening from sleep with SOB.  Vascular:?no calf pain with walking,?no leg cramping.  Respiratory:??no cough,?no sputum,?no coughing up blood,?no SOB,?no wheezing,?no painful breathing.  Gastrointestinal:?no swallowing difficulty,?no heartburn,?no change in appetite,?no nausea,?no change in bowel habits,?no rectal bleeding,?no constipation,?no diarrhea,?no yellow eyes or skin.  Urinary:?no frequency,?no urgency,?no burning or pain,?no blood in urine,?no incontinence,?no change in urinary strength.  Musculoskeletal:?muscle or joint pain,?stiffness,?back pain,?no redness of joints,?no swelling of joints,?no trauma.  Skin:?no rashes,?no lumps,?no itching,?no dryness,?color normal for ethnicity,?no hair or nail changes.  Neurologic:?no dizziness,?no fainting,?no seizures,?no weakness,?no numbness,?no tingling,?no tremors.  Psychiatric:?no nervousness,?no stress,?no depression,?no memory loss.  Endocrine:?no heat or cold intolerance,?no sweating,?no frequent urination,?no thirst,?no change in appetite.  Hematologic:?no ease of bruising,?no ease of bleeding.  ?  ?  Physical Exam  Vitals & Measurements  BP:?158/86?  HT:?152?cm? HT:?152?cm? WT:?83.4?kg? WT:?83.4?kg? BMI:?36.1?  VITAL SIGNS:? Reviewed.? ?  GENERAL:? In?no apparent distress.? Alert and Oriented x3  HEAD:?No signsof head trauma. Normocephalic  EYES:? Pupils?equal/round/reactive.? Extraocular motionsintact.  EARS:? Hearing?grossly intact. TMs and EAC?clear  MOUTH:? Oropharynx is clear. No erythema. No exudates  NECK:? No LAD. No JVD. No thyromegaly. No bruits  CHEST:? Chest with clear breath sounds bilaterally.? No wheezes, rales, or rhonchi. Good air movement  CARDIAC:? Regular rate and rhythm.? S1 and S2, without murmurs, gallops, or rubs.  VASCULAR:? No Edema.? Peripheral pulses normal and  equal in all extremities.  ABDOMEN:? Soft, without detectable tenderness.? No sign of distention.? No? rebound or guarding, and no masses palpated.? ?Bowel Sounds present and normal x 4.? 3 x 3 cm area inferior to the umbilicus?with noted bulge and?fatty material within?the hernia/reducible in standing position and supine/no erythema,?no induration, nontender.  MUSCULOSKELETAL:?Pain with range of motion?of back. 5/5 strength throughout. Extremities without clubbing, cyanosis or edema.  NEUROLOGIC EXAM:? Alert and oriented x 3.? No focal sensory or strength deficits.? ?Speech normal.? Follows commands.  SKIN:? No rash or lesions.?  Assessment/Plan  1.?Hypertension  ?  Essential Hypertension:?At goal per JNC 8 guidelines at home and near goal in office.? No change in medication regimen currently  ?   1. ?Advocate 100% compliance?with medication regimen?and?low-sodium diet  2. ?Advocate?increased?cardiovascular exercise as tolerated and educated upon  3.? 10% weight loss goal  4.? Monitor blood pressure?daily?with?an antecubital?digital?cuff  5. ?Follow-up in 6 months for?[wellness visit]  6. ?Future Lab:?BMP/UA today/wellness visit and labs in 6 months  ?  ?  ?  ?  Ordered:  hydrochlorothiazide-triamterene, 0.5 tab(s), Oral, Daily, # 45 tab(s), 1 Refill(s), Pharmacy: [facility]/pharmacy #[id number]  quinapril, 20 mg = 1 tab(s), Oral, BID, # 180 tab(s), 1 Refill(s), Pharmacy: [facility]/pharmacy #[id number]  Chem 7, Routine collect, 04/20/18 9:37:00 CDT, Blood, Stop date 04/20/18 9:37:00 CDT, Lab Collect, Hypertension, 04/20/18 9:37:00 CDT  Office/Outpatient Visit Level 4 Established [id number] PC, Hypertension  Hyperlipidemia  Rheumatoid arthritis  Osteoporosis, [facility] - AFP, 04/20/18 9:34:00 CDT  Urinalysis Complete no reflex, Routine collect, Urine, 04/20/18 9:37:00 CDT, Stop date 04/20/18 9:37:00 CDT, Nurse collect, Hypertension  ?  2.?Hyperlipidemia  ?-Continue WelChol as prescribed  -Hyperlipidemic diet given  -Lifestyle discussed at  length  Ordered:  Office/Outpatient Visit Level 4 Established [id number] PC, Hypertension  Hyperlipidemia  Rheumatoid arthritis  Osteoporosis, [facility] - AFP, 04/20/18 9:34:00 CDT  ?  3.?Rheumatoid arthritis  ?-stable nonchanging currently  -Follow-up with PMNR  -Alternate ice and heat over affected areas as needed  Ordered:  Office/Outpatient Visit Level 4 Established [id number] PC, Hypertension  Hyperlipidemia  Rheumatoid arthritis  Osteoporosis, [facility] - AFP, 04/20/18 9:34:00 CDT  ?  4.?Osteoarthritis  ?-stable nonchanging currently  -Follow-up with PMNR  -Alternate ice and heat over affected areas as needed  ?  5.?Osteoporosis  ?-Continue Boniva as prescribed  -DEXA at next  -I advised the patient to initiate Os-Endy D500?tablet by mouth twice daily?and?reinitiate?weight resistance as soon as possible  Ordered:  Office/Outpatient Visit Level 4 Established [id number] PC, Hypertension  Hyperlipidemia  Rheumatoid arthritis  Osteoporosis, [facility] - AFP, 04/20/18 9:34:00 CDT  ?  6.?Umbilical hernia  ?-nonincarcerated hernia umbilicus/likely due to recent coughing episodes and increased BMI  -Pathophysiology discussed at length  -ER precautions given?and red flags discussed that would indicate incarceration.? The patient will monitor closely for change  ?   Problem List/Past Medical History  Ongoing  Acid reflux  Hyperlipidemia  Obesity  Osteoarthritis  primary bilary cirrhosis  Primary osteoarthritis of left knee  Rheumatoid arthritis  Historical  Hypertension  Procedure/Surgical History  Replacement of Left Knee Joint with Synthetic Substitute, Cemented, Open Approach (05/09/2016), Total Knee Arthroplasty (Left) (05/09/2016), Destruction of Left Lens, Percutaneous Approach (10/29/2015), Discission of secondary membranous cataract (opacified posterior lens capsule and/or anterior hyaloid); laser surgery (eg, YAG laser) (1 or more stages) (10/29/2015), Total knee replacement (08/12/2013), Colonoscopy (03/18/2010),  Appendectomy;, arthoscopy - left knee, , Cholecystectomy, cyst removed right hand, lumbar fusion, removal of catract bilatterally, right knee replacement, Tonsillectomy.  Medications  Accupril 20 mg oral tablet, 20 mg= 1 tab(s), Oral, BID, 1 refills  AMLODIPINE BESYLATE 10 MG TAB, 10 mg= 1 tab(s), Oral, Daily  Boniva 150 mg oral tablet, 150 mg= 1 tab(s), Oral, qMonth  diclofenac sodium 75 mg oral delayed release tablet, 75 mg= 1 tab(s), Oral, BID, PRN, 2 refills  Ecotrin 325 mg oral enteric coated tablet, 325 mg= 1 tab(s), Oral, Daily  hydrochlorothiazide-triamterene 25 mg-37.5 mg oral tablet, 0.5 tab(s), Oral, Daily, 1 refills  Louann 250 mg oral tablet, 1 1/2 tab(s), Oral, TID  Welchol 625 mg oral tablet, 1875 mg= 3 tab(s), Oral, BID  Allergies  acetaminophen?(has primary cirrhosis)  Social History  Alcohol  Current, Liquor, Daily, 2016  Liquor, 2013  Substance Abuse - Denies Substance Abuse, 2013  Tobacco  Cigarettes, 2013  Family History  Diabetes mellitus type 2: Sister.  Hypertension: Negative: Mother and Father.  Primary malignant neoplasm of colon: Grandfather.  Immunizations  Vaccine Date Status   influenza virus vaccine, inactivated  Recorded   zoster vaccine live 2015 Recorded   pneumococcal 23-polyvalent vaccine 2015 Recorded   pneumococcal 13-valent conjugate vaccine 2013 Recorded

## 2022-05-02 NOTE — HISTORICAL [facility] CERNER
This is a historical note converted from Kelly. Formatting and pictures may have been removed.  Please reference Kelly for original formatting and attached multimedia. Chief Complaint  6 mth ov  History of Present Illness  She is also?here in clinic for evaluation of hypertension.? The patient reports home blood pressures of 130s over 70-80s.? The patient reports 100% compliance with medication.? The patient reports no side effects with medication use.? The patient reports following a low-sodium diet.? The patient reports some cardiovascular exercise implementation.? There is no chest pain or shortness of breath reported with exercise.  She reports 100% compliance with her medications with no side effects?for her?osteoporosis and hyperlipidemia. ?Following low-cholesterol diet.? Attempting weightbearing exercise for bone health as tolerated, just getting back into this as she has been dealing with post surgical pain- she is still followed by Dr. French.  She is also taking Lexapro 5 mg for anxiety, she is currently tolerating this well without any side effects, no breakthrough symptoms, denies any depression, wishes to continue current dosing without adjustments  Review of Systems  Constitutional:?no weight gain,?no weight loss,?no fatigue,?no fever,?no chills,?no weakness,?no trouble sleeping.  Cardiovascular:?no chest pain or discomfort,?no tightness,?no palpitations,?no SOB with activity,?no difficulty breathing while supine,?no swelling,?no sudden awakening from sleep with SOB.  Respiratory:??no cough,?no sputum,?no coughing up blood,?no SOB,?no wheezing,?no painful breathing.  Physical Exam  Vitals & Measurements  BP:?142/82?  HT:?157.00?cm? WT:?80.700?kg? BMI:?32.74?  Cardiovascular- RRR W/O MGR, Pulses equal throughout  ?   Respiratory- CTA, No wheezing, No crackles, No rhonchi  ?  Assessment/Plan  1.?Hypertension?I10  1. Refill meds x 6 months  2. Continue monitor Na+ intake in diet  3. Exercise as  tolerated, diet modifications (TLC)  4. Follow OV in 6 months or CPX, sooner if needed  ?  2.?Hyperlipidemia?E78.5  1. Continue current diet modifications and exercise as tolerated (TLC)  ?  3.?Anxiety?F41.9  ?1. ?Currently controlled with Lexapro 5 mg, tolerating well without any side effects, currently stable, no breakthrough symptoms  ?  4.?Need for vaccination?Z23  1. ?High-dose flu today?  2. Shingrix sent to pharmacy, side effects reviewed and discussed at length  Ordered:  Influenza Virus Vaccine, Inactivated, 0.5 mL, IM, Once-Unscheduled, first dose 09/21/21 9:19:00 CDT  ?  Orders:  amLODIPine, 10 mg = 1 tab(s), Oral, Daily, # 90 tab(s), 1 Refill(s), [id number] 157, cm, Height/Length Dosing, 09/21/21 8:41:00 CDT, 80.7, kg, Weight Dosing, 09/21/21 8:41:00 CDT  hydrochlorothiazide-triamterene, See Instructions, TAKE 1/2 TABLET BY MOUTH EVERY DAY, # 45 tab(s), 3 Refill(s), [id number], 157, cm, Height/Length Dosing, 09/21/21 8:41:00 CDT, 80.7, kg, Weight Dosing, 09/21/21 8:41:00 CDT  quinapril, See Instructions, TAKE 1 TABLET BY MOUTH TWICE A DAY, # 180 tab(s), 0 Refill(s), Pharmacy: [facility] STORE [id number], 157, cm, Height/Length Dosing, 08/18/21 9:03:00 CDT, 79.2, kg, Weight Dosing, 08/18/21 9:03:00 CDT   Problem List/Past Medical History  Ongoing  Acid reflux  Hyperlipidemia  Hypertension  Obesity  Osteoarthritis  Osteoporosis  primary bilary cirrhosis  Primary osteoarthritis of left knee  Rheumatoid arthritis  Status post reverse arthroplasty of shoulder  Wellness examination  Historical  No qualifying data  Procedure/Surgical History  DXA BONE DENSITY STUDY 1+ SITS AXIAL SKEL (09/15/2020)  Replacement of Left Shoulder Joint with Reverse Ball and Socket Synthetic Substitute, Open Approach (04/03/2020)  Total Reverse Shoulder (Left) (04/03/2020)  Replacement of Right Shoulder Joint with Reverse Ball and Socket Synthetic Substitute, Open Approach (03/31/2020)  Total Reverse  Shoulder (Right) (2020)  Repair Scalp Subcutaneous Tissue and Fascia, Percutaneous Approach (2020)  Fusion of 2 or more Cervical Vertebral Joints with Autologous Tissue Substitute, Posterior Approach, Posterior Column, Open Approach (2018)  Fusion of 2 to 7 Thoracic Vertebral Joints with Autologous Tissue Substitute, Posterior Approach, Posterior Column, Open Approach (2018)  Fusion of Cervicothoracic Vertebral Joint with Autologous Tissue Substitute, Posterior Approach, Posterior Column, Open Approach (2018)  Posterior Cervical Fusion with O-arm (.) (2018)  Removal of Internal Fixation Device from Cervical Vertebra, Open Approach (2018)  Removal of Internal Fixation Device from Thoracic Vertebra, Open Approach (2018)  Procedure on spine (2018)  Replacement of Left Knee Joint with Synthetic Substitute, Cemented, Open Approach (2016)  Total Knee Arthroplasty (Left) (2016)  Bone density scan ()  Destruction of Left Lens, Percutaneous Approach (10/29/2015)  Discission of secondary membranous cataract (opacified posterior lens capsule and/or anterior hyaloid); laser surgery (eg, YAG laser) (1 or more stages) (10/29/2015)  Total knee replacement (2013)  Colonoscopy (2010)  Appendectomy;  arthoscopy - left knee    Cholecystectomy  cyst removed right hand  lumbar fusion  removal of catract bilatterally  right knee replacement  Tonsillectomy   Medications  Acidophilus Probiotic Blend, 10 billion cell, Oral, Daily  Adult High-Dose Influenza Vaccine, 0.5 mL, IM, Once-Unscheduled  amLODIPine 10 mg oral tablet, 10 mg= 1 tab(s), Oral, Daily, 1 refills  ascorbic acid 500 mg oral tablet, 500 mg= 1 tab(s), Oral, At Bedtime  aspirin 81 mg oral tablet, CHEWABLE, 81 mg, Oral, Daily  cholestyramine 4 g/9 g oral powder for reconstitution, 4 gm, Oral, BID  Co-Q10, 1 tab, Oral, Daily  escitalopram 5 mg oral tablet, See Instructions, 3  refills  hydrochlorothiazide-triamterene 25 mg-37.5 mg oral tablet, See Instructions, 3 refills  ibandronate 150 mg oral tablet, See Instructions, 4 refills  Oystercal-D, 1 tab(s), Oral, BID  quinapril 20 mg oral tablet, See Instructions  Louann 250 mg oral tablet, 1000 mg= 4 tab(s), Oral, BID  Allergies  acetaminophen?(has primary cirrhosis)  Social History  Abuse/Neglect  No, 03/23/2021  No, 03/01/2021  Alcohol  Current, Liquor, Daily, 05/09/2016  Liquor, 07/30/2013  Substance Use - Denies Substance Abuse, 07/30/2013  Tobacco  Former smoker, quit more than 30 days ago, No, 03/23/2021  Former smoker, quit more than 30 days ago, No, 03/01/2021  Family History  Diabetes mellitus type 2: Sister.  Hypertension: Negative: Mother and Father.  Primary malignant neoplasm of colon: Grandfather.  Immunizations  Vaccine Date Status Comments   influenza virus vaccine, inactivated 10/01/2020 Given    influenza virus vaccine, inactivated - Not Given Already had disease     influenza virus vaccine, inactivated 11/12/2019 Given    influenza virus vaccine, inactivated 10/30/2018 Given    tetanus/diphtheria/pertussis, acel(Tdap) 10/30/2018 Given    influenza virus vaccine, inactivated 2017 Recorded    zoster vaccine live 12/29/2015 Recorded    pneumococcal 23-polyvalent vaccine 09/28/2015 Recorded    pneumococcal 13-valent conjugate vaccine 2014 Recorded    Health Maintenance  Health Maintenance  ???Pending?(in the next year)  ??? ??OverDue  ??? ? ? ?Hypertension Management-Education due??05/09/20??and every 1??year(s)  ??? ? ? ?Advance Directive due??01/02/21??and every 1??year(s)  ??? ? ? ?Cognitive Screening due??01/02/21??and every 1??year(s)  ??? ? ? ?Functional Assessment due??01/02/21??and every 1??year(s)  ??? ? ? ?ADL Screening due??04/15/21??and every 1??year(s)  ??? ??Due?  ??? ? ? ?Zoster Vaccine due??09/21/21??Unknown Frequency  ??? ??Due In Future?  ??? ? ? ?Depression Screening not due until??09/23/21??and every  1??year(s)  ??? ? ? ?Obesity Screening not due until??01/01/22??and every 1??year(s)  ??? ? ? ?Fall Risk Assessment not due until??01/02/22??and every 1??year(s)  ??? ? ? ?Hypertension Management-BMP not due until??03/23/22??and every 1??year(s)  ??? ? ? ?Aspirin Therapy for CVD Prevention not due until??03/23/22??and every 1??year(s)  ??? ? ? ?Medicare Annual Wellness Exam not due until??03/23/22??and every 1??year(s)  ???Satisfied?(in the past 1 year)  ??? ??Satisfied?  ??? ? ? ?Aspirin Therapy for CVD Prevention on??03/23/21.??Satisfied by Larry Winters NP Mariana  ??? ? ? ?Blood Pressure Screening on??09/21/21.??Satisfied by Larry Winters NP Mariana  ??? ? ? ?Body Mass Index Check on??09/21/21.??Satisfied by Amanda Lopez MA  ??? ? ? ?Depression Screening on??09/23/20.??Satisfied by Larry Winters NP Mariana  ??? ? ? ?Diabetes Maintenance-Fasting Lipid Profile on??03/23/21.??Satisfied by Noemi Gonzáles  ??? ? ? ?Diabetes Screening on??03/23/21.??Satisfied by Noemi Gonzáles  ??? ? ? ?Fall Risk Assessment on??03/01/21.??Satisfied by Kacie Donald LPN  ??? ? ? ?Functional Assessment on??11/11/20.??Satisfied by Juan Perez  ??? ? ? ?Hypertension Management-Blood Pressure on??09/21/21.??Satisfied by Larry Winters NP Mariana  ??? ? ? ?Influenza Vaccine on??09/21/21.??Satisfied by Larry Winters NP Mariana  ??? ? ? ?Lipid Screening on??03/23/21.??Satisfied by Noemi Gonzáles  ??? ? ? ?Medicare Annual Wellness Exam on??03/23/21.??Satisfied by Mariana Back NP  ??? ? ? ?Obesity Screening on??09/21/21.??Satisfied by Amanda Lopez MA  ??? ??Refused?  ??? ? ? ?Zoster Vaccine on??03/23/21.??Recorded by Mariana Back NP??Reason: Patient Refuses  ?      The?physician?is present within?the office with the?nurse practitioner.??The?office visit?documentation and management have been?reviewed and agreed upon.? I will continue to follow?this patient along with?the nurse  practitioner?for current and future care.

## 2022-05-02 NOTE — HISTORICAL [facility]
This is a historical note converted from Kelly. Formatting and pictures may have been removed.  Please reference Kelly for original formatting and attached multimedia. Chief Complaint  Pt is about 5 months s/p dwight rev TSA on 4/3/2020. Pt reports she is doing well.  History of Present Illness  3/31/2020: Right reverse total shoulder arthroplasty  4/3/2020: Left reverse total shoulder arthroplasty  ?   She returns today. ?Her pains improving. ?She is working with therapy.  Review of Systems  Comprehensive review of system?was performed with no exceptions other than noted in the history of present illness  Physical Exam  Vitals & Measurements  T:?97.3? ?F (Oral)? HR:?98(Peripheral)? BP:?145/82?  HT:?157.00?cm? WT:?82.550?kg? BMI:?33.49?  Gen: WN, WD, NAD  Card/Res: NL breathing, +distal pulses  Abdomen: ND  Shoulder Exam:??????????Right??????????Left  Skin:??????????????????????????????Normal???????Normal  AC joint tenderness:??????????None??????????None  Forward Flexion:?????????????120 ??????????110  Abduction:?????????????????????100??????????? 100  External Rotation:?????????????? 40????????????40  Internal Rotation?????????????? 80 ???????????? 80  ?  ?   N-V status:?????????????????????????Intact??????????Intact  ?   C-spine: Normal ROM, NT  ?  ?  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 ?Continue rehab. ?I will see her back in 6 months with radiographs of bilateral shoulders  Ordered:  Clinic Follow up, *Est. 03/21/21 3:00:00 CDT, Order for future visit, Status post reverse arthroplasty of shoulder, Orthopaedics  Office/Outpatient Visit Level 3 Established 85386 PC, Status post reverse arthroplasty of shoulder, Orthopaedics Clinic, 09/21/20 11:06:00 CDT  ?  Orders:  XR Shoulder Left Minimum 2 Views, Routine, 09/21/20 10:34:00 CDT, None, Patient Bed, Patient Has IV?, Rad Type, Left shoulder pain, Not Scheduled, 09/21/20 10:34:00 CDT  XR Shoulder  Right Minimum 2 Views, Routine, 09/21/20 10:34:00 CDT, None, Patient Bed, Patient Has IV?, Rad Type, Right shoulder pain, Not Scheduled, 09/21/20 10:34:00 CDT  Referrals  Clinic Follow up, *Est. 03/21/21 3:00:00 CDT, Order for future visit, Status post reverse arthroplasty of shoulder, [facility]   Problem List/Past Medical History  Ongoing  Acid reflux  Hyperlipidemia  Hypertension  Obesity  Osteoarthritis  Osteoporosis  primary bilary cirrhosis  Primary osteoarthritis of left knee  Rheumatoid arthritis  Status post reverse arthroplasty of shoulder  Wellness examination  Historical  No qualifying data  Procedure/Surgical History  Replacement of Left Shoulder Joint with Reverse Ball and Socket Synthetic Substitute, Open Approach (04/03/2020)  Total Reverse Shoulder (Left) (04/03/2020)  Replacement of Right Shoulder Joint with Reverse Ball and Socket Synthetic Substitute, Open Approach (03/31/2020)  Total Reverse Shoulder (Right) (03/31/2020)  Repair Scalp Subcutaneous Tissue and Fascia, Percutaneous Approach (03/30/2020)  Fusion of 2 or more Cervical Vertebral Joints with Autologous Tissue Substitute, Posterior Approach, Posterior Column, Open Approach (11/26/2018)  Fusion of 2 to 7 Thoracic Vertebral Joints with Autologous Tissue Substitute, Posterior Approach, Posterior Column, Open Approach (11/26/2018)  Fusion of Cervicothoracic Vertebral Joint with Autologous Tissue Substitute, Posterior Approach, Posterior Column, Open Approach (11/26/2018)  Posterior Cervical Fusion with O-arm (.) (11/26/2018)  Removal of Internal Fixation Device from Cervical Vertebra, Open Approach (11/26/2018)  Removal of Internal Fixation Device from Thoracic Vertebra, Open Approach (11/26/2018)  Procedure on spine (06/18/2018)  Replacement of Left Knee Joint with Synthetic Substitute, Cemented, Open Approach (05/09/2016)  Total Knee Arthroplasty (Left) (05/09/2016)  Bone density scan (2016)  Destruction of Left Lens, Percutaneous  Approach (10/29/2015)  Discission of secondary membranous cataract (opacified posterior lens capsule and/or anterior hyaloid); laser surgery (eg, YAG laser) (1 or more stages) (10/29/2015)  Total knee replacement (2013)  Colonoscopy (2010)  Appendectomy;  arthoscopy - left knee    Cholecystectomy  cyst removed right hand  lumbar fusion  removal of catract bilatterally  right knee replacement  Tonsillectomy   Medications  acetaminophen-hydrocodone 325 mg-5 mg oral tablet, 1 tab(s), Oral, q6hr, PRN  Acidophilus Probiotic Blend, 10 billion cell, Oral, Daily  amLODIPine 10 mg oral tablet, See Instructions  ascorbic acid 500 mg oral tablet, 500 mg= 1 tab(s), Oral, At Bedtime  aspirin 81 mg oral tablet, 81 mg= 1 tab(s), Oral, Daily  Co-Q10, 1 tab, Oral, Daily  escitalopram 5 mg oral tablet, 5 mg= 1 tab(s), Oral, Daily, 2 refills  hydrochlorothiazide-triamterene 25 mg-37.5 mg oral tablet, 0.5 tab(s), Oral, Daily  ibandronate 150 mg oral tablet, See Instructions, 3 refills  methocarbamol 500 mg oral tablet, 500 mg= 1 tab(s), Oral, TID  Neurontin 300 mg oral capsule, 300 mg= 1 cap(s), Oral, At Bedtime  Oystercal-D, 1 tab(s), Oral, BID  polyethylene glycol 3350 oral powder for reconstitution, Oral, Daily  quinapril 20 mg oral tablet, 20 mg= 1 tab(s), Oral, BID, 1 refills  Senokot S 50 mg-8.6 mg oral tablet, 2 tab(s), Oral, BID  tiZANidine 4 mg oral tablet, 4 mg= 1 tab(s), Oral, q8hr  Ultram 50 mg oral tablet, 50 mg= 1 tab(s), Oral, q6hr, PRN  Louann 250 mg oral tablet, 1000 mg= 4 tab(s), Oral, BID  Welchol 625 mg oral tablet, 1875 mg= 3 tab(s), Oral, BID, 3 refills  Allergies  acetaminophen?(has primary cirrhosis)  Social History  Abuse/Neglect  No, No, Yes, 2020  Alcohol  Current, Liquor, Daily, 2016  Liquor, 2013  Substance Use - Denies Substance Abuse, 2013  Tobacco  Former smoker, quit more than 30 days ago, No, 2020  Family History  Diabetes mellitus type 2:  Sister.  Hypertension: Negative: Mother and Father.  Primary malignant neoplasm of colon: Grandfather.  Immunizations  Vaccine Date Status   influenza virus vaccine, inactivated 11/12/2019 Given   influenza virus vaccine, inactivated 10/30/2018 Given   tetanus/diphtheria/pertussis, acel(Tdap) 10/30/2018 Given   influenza virus vaccine, inactivated 2017 Recorded   zoster vaccine live 12/29/2015 Recorded   pneumococcal 23-polyvalent vaccine 09/28/2015 Recorded   pneumococcal 13-valent conjugate vaccine 2014 Recorded   Health Maintenance  Health Maintenance  ???Pending?(in the next year)  ??? ??OverDue  ??? ? ? ?Influenza Vaccine due??and every?  ??? ? ? ?Pneumococcal Vaccine due??and every?  ??? ? ? ?Depression Screening due??07/12/17??and every 1??year(s)  ??? ? ? ?Advance Directive due??01/02/20??and every 1??year(s)  ??? ? ? ?Cognitive Screening due??01/02/20??and every 1??year(s)  ??? ? ? ?Hypertension Management-Education due??05/09/20??and every 1??year(s)  ??? ??Due?  ??? ? ? ?Zoster Vaccine due??09/21/20??and every?  ??? ??Due In Future?  ??? ? ? ?Obesity Screening not due until??01/01/21??and every 1??year(s)  ??? ? ? ?Fall Risk Assessment not due until??01/02/21??and every 1??year(s)  ??? ? ? ?Functional Assessment not due until??01/02/21??and every 1??year(s)  ??? ? ? ?Medicare Annual Wellness Exam not due until??03/19/21??and every 1??year(s)  ??? ? ? ?Hypertension Management-BMP not due until??04/04/21??and every 1??year(s)  ??? ? ? ?Aspirin Therapy for CVD Prevention not due until??04/06/21??and every 1??year(s)  ??? ? ? ?ADL Screening not due until??04/15/21??and every 1??year(s)  ??? ? ? ?Blood Pressure Screening not due until??06/24/21??and every 1??year(s)  ??? ? ? ?Hypertension Management-Blood Pressure not due until??06/24/21??and every 1??year(s)  ??? ? ? ?Body Mass Index Check not due until??08/26/21??and every 1??year(s)  ???Satisfied?(in the past 1 year)  ??? ??Satisfied?  ??? ? ? ?ADL  Screening on??04/15/20.??Satisfied by Sophie Crawford  ??? ? ? ?Aspirin Therapy for CVD Prevention on??04/06/20.??Satisfied by Luly Wahl RN  ??? ? ? ?Blood Pressure Screening on??09/21/20.??Satisfied by Maddy Pimentel  ??? ? ? ?Body Mass Index Check on??09/21/20.??Satisfied by Maddy Pimentel  ??? ? ? ?Diabetes Maintenance-Fasting Lipid Profile on??03/19/20.??Satisfied by Noemi Gonzáles  ??? ? ? ?Diabetes Screening on??04/04/20.??Satisfied by Valerie Rawls  ??? ? ? ?Fall Risk Assessment on??09/21/20.??Satisfied by Maddy Pimentel  ??? ? ? ?Functional Assessment on??04/06/20.??Satisfied by Luly Wahl RN  ??? ? ? ?Hypertension Management-Blood Pressure on??09/21/20.??Satisfied by Maddy Pimentel  ??? ? ? ?Influenza Vaccine on??11/12/19.??Satisfied by Amanda Lopez MA  ??? ? ? ?Lipid Screening on??03/19/20.??Satisfied by Noemi Gonzáles  ??? ? ? ?Medicare Annual Wellness Exam on??03/19/20.??Satisfied by Jos Briseno MD  ??? ? ? ?Obesity Screening on??09/21/20.??Satisfied by Maddy Pimentel  ?  Diagnostic Results  Bilateral shoulder radiographs show appropriate implant position

## 2022-05-02 NOTE — HISTORICAL [facility]
This is a historical note converted from [place]. Formatting and pictures may have been removed.  Please reference [place] for original formatting and attached multimedia. Chief Complaint  6 mth htn ov  History of Present Illness  The patient is a 79yo white female.? ?Here in clinic for evaluation of hypertension.? The patient reports home blood pressures of 130s over 80s.? The patient reports 100% compliance with medication.? The patient reports no side effects with medication use.? The patient reports following a low-sodium diet.? The patient reports some cardiovascular exercise implementation.? There is no chest pain or shortness of breath reported with exercise.  The patient is also had 2 major?neck/back surgeries?and is healing well at this time but is still fairly limited in what she can do for activity. ?She does report some decreased mood and sadness?secondary to the?lack of?ambulation and her increase?sedentary lifestyle.? She has periods of sadness and labile emotion and is withdrawing from those around her. ?She finds no pleasure in hobbies she used to?perform that made her happy. ?No suicidal or homicidal ideations. ?She is open to?initiation of low-dose SSRI for?alleviation of symptoms.  Review of Systems  Constitutional_no fever chills,?no unintentional weight loss  Eye_  ENMT_  Respiratory_no shortness of breath or cough  Cardiovascular_no chest pain?or shortness of breath  Gastrointestinal_  Genitourinary_  Hema/Lymph_  Endocrine_  Immunologic_  Musculoskeletal_  Integumentary_  Neurologic/psych_as per HPI  All Other ROS_negative  Physical Exam  Vitals & Measurements  BP:?142/80?  HT:?152?cm? WT:?75.2?kg? BMI:?32.55?  VITAL SIGNS:? Reviewed.? ?  GENERAL:? In?no apparent distress.? Alert and Oriented x3  CHEST:? Chest with clear breath sounds bilaterally.??No wheezes, rales, or rhonchi. Good air movement  CARDIAC:??Regular rate and rhythm.? S1 and S2,?without murmurs, gallops, or rubs.  ABDOMINAL:  Normal active BS X all 4 quadrants. Nontender. Nondistended.  NEUROLOGIC EXAM:? Alert and oriented x 3.? No focal sensory or strength deficits.? ?Speech normal.? Follows commands.  MUSCULOSKELATAL: Full range of motion.? 5 out of 5 strength throughout.  SKIN: No rash. ?No lesion.  PSYCHIATRIC:? Mood normal.? Linear/lucid/flattened affect/no internal stimuli response/MMSE essentially normal  ?  ?  Assessment/Plan  1.?Hypertension?I10  ?  Essential Hypertension:?At goal per?JNC 8 guidelines.? No change in medication regimen. ?6-month prescription written-see below  ?   1. ?Advocate 100% compliance?with medication regimen?and?low-sodium diet  2. ?Advocate?increased?cardiovascular exercise as tolerated and educated upon  3.? 10% weight loss goal  4.? Monitor blood pressure?daily?with?an antecubital?digital?cuff  5. ?Follow-up in 6 months for?wellness visit  6. ?Future Lab: HTN labs today  ?  ?  ?  ?  Ordered:  amLODIPine, 10 mg = 1 tab(s), Oral, Daily, # 90 tab(s), 1 Refill(s), Pharmacy: [facility]/pharmacy #[id number]  hydrochlorothiazide-triamterene, See Instructions, TAKE 1/2 TABLET BY MOUTH ONCE A DAY, # 45 tab(s), 1 Refill(s), Pharmacy: [facility]/pharmacy #[id number]  quinapril, 20 mg = 1 tab(s), Oral, qPM, # 90 tab(s), 1 Refill(s), Pharmacy: [facility]/pharmacy #[id number]  Office/Outpatient Visit Level 4 Established [id number] PC, Hypertension  Situational depression, [facility], 05/09/19 10:14:00 CDT  ?  2.?Situational depression?F43.21  ?-Extended counseling on?diagnosis, treatment, prognosis, follow-up  -ER precautions for suicidal and homicidal ideations-none today or in the past  -Trial of Lexapro 5 mg 1 tablet by mouth daily #30 and 2 refills/side effects discussed at length  -Counselor advocated and indicated and the patient will?consider this and let us know if she would like to?be referred  -She will call us in 4 to 6 weeks?with symptom profile?and we will titrate if indicated.  Ordered:  escitalopram, 5 mg = 1 tab(s), Oral, Daily, # 30  tab(s), 2 Refill(s), Pharmacy: [facility]/pharmacy #[id number]  Office/Outpatient Visit Level 4 Established [id number] PC, Hypertension  Situational depression, [facility], 19 10:14:00 CDT  ?  Orders:  Clinic Follow up, *Est. 19 9:15:00 CST, Order for future visit, Wellness examination, [facility]  Referrals  Clinic Follow up, *Est. 19 9:15:00 CST, Order for future visit, Wellness examination, [facility]   Problem List/Past Medical History  Ongoing  Acid reflux  Hyperlipidemia  Hypertension  Obesity  Osteoarthritis  Osteoporosis  primary bilary cirrhosis  Primary osteoarthritis of left knee  Rheumatoid arthritis  Wellness examination  Historical  No qualifying data  Procedure/Surgical History  Fusion of 2 or more Cervical Vertebral Joints with Autologous Tissue Substitute, Posterior Approach, Posterior Column, Open Approach (2018)  Fusion of 2 to 7 Thoracic Vertebral Joints with Autologous Tissue Substitute, Posterior Approach, Posterior Column, Open Approach (2018)  Fusion of Cervicothoracic Vertebral Joint with Autologous Tissue Substitute, Posterior Approach, Posterior Column, Open Approach (2018)  Posterior Cervical Fusion with O-arm (.) (2018)  Removal of Internal Fixation Device from Cervical Vertebra, Open Approach (2018)  Removal of Internal Fixation Device from Thoracic Vertebra, Open Approach (2018)  Procedure on spine (2018)  Replacement of Left Knee Joint with Synthetic Substitute, Cemented, Open Approach (2016)  Total Knee Arthroplasty (Left) (2016)  Bone density scan ()  Destruction of Left Lens, Percutaneous Approach (10/29/2015)  Discission of secondary membranous cataract (opacified posterior lens capsule and/or anterior hyaloid); laser surgery (eg, YAG laser) (1 or more stages) (10/29/2015)  Total knee replacement (2013)  Colonoscopy (2010)  Appendectomy;  arthoscopy - left knee    Cholecystectomy  cyst removed right  hand  lumbar fusion  removal of catract bilatterally  right knee replacement  Tonsillectomy   Medications  amLODIPine 10 mg oral tablet, 10 mg= 1 tab(s), Oral, Daily, 1 refills  Boniva 150 mg oral tablet, 150 mg= 1 tab(s), Oral, qMonth, 3 refills  Co-Q10, Oral  hydrochlorothiazide-triamterene 25 mg-37.5 mg oral tablet, See Instructions, 1 refills  Lexapro 5 mg oral tablet, 5 mg= 1 tab(s), Oral, Daily, 2 refills  oxycodone 5 mg oral tablet, 5 mg= 1 tab(s), Oral, q4hr, PRN,? ?Not taking  oxycodone 5 mg oral tablet, 10 mg= 2 tab(s), Oral, q4hr, PRN,? ?Not taking  quinapril 20 mg oral tablet, 20 mg= 1 tab(s), Oral, qPM, 1 refills  Robaxin 100 mg/mL injectable solution, 750 mg= 7.5 mL, IV Slow, q8hr,? ?Not taking  Robaxin 500 mg oral tablet, 750 mg= 1.5 tab(s), Oral, q8hr, PRN,? ?Not taking  tiZANidine 4 mg oral tablet, 4 mg= 1 tab(s), Oral, q8hr  traMADol 50 mg oral tablet, 50 mg= 1 tab(s), Oral, q8hr  Louann 250 mg oral tablet, 1 1/2 tab(s), Oral, TID  Welchol 625 mg oral tablet, 1875 mg= 3 tab(s), Oral, BID, 3 refills  Allergies  acetaminophen?(has primary cirrhosis)  Social History  Alcohol  Current, Liquor, Daily, 05/09/2016  Liquor, 07/30/2013  Substance Abuse - Denies Substance Abuse, 07/30/2013  Tobacco  Former smoker, quit more than 30 days ago, N/A, 05/09/2019  Former smoker, N/A, 11/26/2018  Former smoker, N/A, 10/30/2018  Cigarettes, 07/30/2013  Family History  Diabetes mellitus type 2: Sister.  Hypertension: Negative: Mother and Father.  Primary malignant neoplasm of colon: Grandfather.  Immunizations  Vaccine Date Status   influenza virus vaccine, inactivated 10/30/2018 Given   tetanus/diphtheria/pertussis, acel(Tdap) 10/30/2018 Given   influenza virus vaccine, inactivated 2017 Recorded   zoster vaccine live 12/29/2015 Recorded   pneumococcal 23-polyvalent vaccine 09/28/2015 Recorded   pneumococcal 13-valent conjugate vaccine 2014 Recorded   Health Maintenance  Health Maintenance  ???Pending?(in the next  year)  ??? ??OverDue  ??? ? ? ?Pneumococcal Vaccine due??and every?  ??? ? ? ?Advance Directive due??01/01/19??and every 1??year(s)  ??? ? ? ?Cognitive Screening due??01/01/19??and every 1??year(s)  ??? ? ? ?Fall Risk Assessment due??01/01/19??and every 1??year(s)  ??? ? ? ?Functional Assessment due??01/01/19??and every 1??year(s)  ??? ? ? ?Geriatric Depression Screening due??01/01/19??and every 1??year(s)  ??? ? ? ?Smoking Cessation due??01/01/19??Variable frequency  ??? ??Due?  ??? ? ? ?ADL Screening due??05/09/19??and every 1??year(s)  ??? ??Due In Future?  ??? ? ? ?Obesity Screening not due until??01/01/20??and every 1??year(s)  ??? ? ? ?Hypertension Management-Blood Pressure not due until??05/08/20??and every 1??year(s)  ??? ? ? ?Hypertension Management-BMP not due until??05/08/20??and every 1??year(s)  ???Satisfied?(in the past 1 year)  ??? ??Satisfied?  ??? ? ? ?Aspirin Therapy for CVD Prevention on??05/09/19.??Satisfied by Jos Briseno MD  ??? ? ? ?Blood Pressure Screening on??05/09/19.??Satisfied by Amanda Lopez MA  ??? ? ? ?Body Mass Index Check on??05/09/19.??Satisfied by Amanda Lopez MA  ??? ? ? ?Diabetes Screening on??05/09/19.??Satisfied by Jeff Mishra  ??? ? ? ?Fall Risk Assessment on??11/30/18.??Satisfied by David Rudd RN  ??? ? ? ?Hypertension Management-Education on??05/09/19.??Satisfied by Jos Briseno MD  ??? ? ? ?Influenza Vaccine on??10/30/18.??Satisfied by Amanda Lopez MA  ??? ? ? ?Lipid Screening on??10/30/18.??Satisfied by Jeff Mishra  ??? ? ? ?Obesity Screening on??05/09/19.??Satisfied by Amanda Lopez MA  ??? ? ? ?Tetanus Vaccine on??10/30/18.??Satisfied by Amanda Lopez MA  ?  ?

## 2022-05-02 NOTE — HISTORICAL [facility]
This is a historical note converted from Kelly. Formatting and pictures may have been removed.  Please reference Kelly for original formatting and attached multimedia. Chief Complaint  cpx-fasting  History of Present Illness  The patient is a 79 year old white female here today for a complete Wellness physical.? The patient?has?no?no acute complaints today. The patient also carries a diagnosis of-multi-comorbid see list, which is assessed today.? Exercise is reported as minimal?secondary to recent back surgeries and includes?primarily walking right now but she reports no chest pain or shortness of breath.?? Diet modifications are reported as?low-sodium/low-cholesterol.?? Previous documented weight is recorded? as charted.  She is also?here in clinic for evaluation of hypertension.? The patient reports home blood pressures of 130s over 80s.? The patient reports 100% compliance with medication.? The patient reports no side effects with medication use.? The patient reports following a low-sodium diet.? The patient reports some cardiovascular exercise implementation.? There is no chest pain or shortness of breath reported with exercise.  She reports 100% compliance with her medications with no side effects?for her?osteoporosis and hyperlipidemia. ?Following low-cholesterol diet.? Attempting weightbearing exercise for bone health. ?She had a recent major?cervical spine procedure?and is healing well from this.  Review of Systems  Constitutional:?no weight gain,?no weight loss,?no fatigue,?no fever,?no chills,?no weakness,?no trouble sleeping.  Eyes:?no vision loss/changes,?no glasses or contacts,?no pain,?no redness,?no blurry or double vision,?no flashing lights,?no specks,?no glaucoma,?no cataracts.  Last eye exam:?within last 6 months  Head:?no headache,?no head injury,?no neck pain.?  Neck:??no lumps,?no swollen glands,?no stiffness.  Ears:?no decreased hearing,?no ringing,?no earache,?no drainage.?  Nose:?no  stuffiness,?no discharge,?no itching,?no hay fever,?no nosebleeds,?no sinus pain.  Throat:?no bleeding,?no dentures,?no sore tongue,?no dry mouth,?no sore throat,?no hoarseness,?no thrush,?no non-healing sores.  Cardiovascular:?no chest pain or discomfort,?no tightness,?no palpitations,?no SOB with activity,?no difficulty breathing while supine,?no swelling,?no sudden awakening from sleep with SOB.  Vascular:?no calf pain with walking,?no leg cramping.  Respiratory:??no cough,?no sputum,?no coughing up blood,?no SOB,?no wheezing,?no painful breathing.  Gastrointestinal:?no swallowing difficulty,?no heartburn,?no change in appetite,?no nausea,?no change in bowel habits,?no rectal bleeding,?no constipation,?no diarrhea,?no yellow eyes or skin.  Urinary:?no frequency,?no urgency,?no burning or pain,?no blood in urine,?no incontinence,?no change in urinary strength.  Musculoskeletal:?no muscle or joint pain,?no stiffness,?no back pain,?no redness of joints,?no swelling of joints,?no trauma.  Skin:?no rashes,?no lumps,?no itching,?no dryness,?color normal for ethnicity,?no hair or nail changes.  Neurologic:?no dizziness,?no fainting,?no seizures,?no weakness,?no numbness,?no tingling,?no tremors.  Psychiatric:?no nervousness,?no stress,?no depression,?no memory loss.  Endocrine:?no heat or cold intolerance,?no sweating,?no frequent urination,?no thirst,?no change in appetite.  Hematologic:?no ease of bruising,?no ease of bleeding.  ?  ?  ?  ?  Physical Exam  Vitals & Measurements  BP:?130/90?  BMI:?35.79?  VITAL SIGNS:? Reviewed.? ?  GENERAL:? In?no apparent distress.? Alert and Oriented x3  HEAD:?No signsof head trauma. Normocephalic  EYES:? Pupils?equal/round/reactive.? Extraocular motionsintact.  EARS:? Hearing?grossly intact. TMs and EAC?clear  MOUTH:??Oropharynx is clear.?No erythema. No exudates  NECK:? No LAD. No JVD. No thyromegaly. No bruits  CHEST:? Chest with clear breath sounds bilaterally.? No wheezes,  rales, or rhonchi. Good air movement  CARDIAC:? Regular rate and rhythm.? S1 and S2, without murmurs, gallops, or rubs.  VASCULAR:??No Edema.? Peripheral pulses normal and equal in all extremities.  ABDOMEN:?Soft, without detectable tenderness.??No sign of distention.?No rebound or guarding, and no masses palpated.? ?Bowel Sounds present and normal x 4.  MUSCULOSKELETAL:??Good range of motion of all major joints.?5/5 strength throughout. Extremities without clubbing, cyanosis or edema.  NEUROLOGIC EXAM:? Alert and oriented x 3.? No focal sensory or strength deficits.? ?Speech normal.? Follows commands.  PSYCHIATRIC:? Mood normal.  SKIN:??No rash or lesions.  Assessment/Plan  1.?Wellness examination?Z00.00  ?Assessment/Plan:  ?   1.?Wellness  ?   -Health and Exercise Prescription issued and educated upon  ?   -10% weight loss goal  ?   -Lifestyle counseling >20minutes  ?   -Diet: Lean proteins and increased vegetable matter  ?   -Screening: UTD?per age?for colonoscopy and Pap; mammogram performed by her GYN and UTD  ?   -Vaccines: Shingrix No. 1 and #2 sent to pharmacy; otherwise up-to-date  ?   -Labs:?See below  ?   2.Comorbidities:?See below  ?  3. Referrals:?None  ?  4. RTC: 12-month wellness  ?  Ordered:  Medicare Annual Wellness- Subsequent  PC, Wellness examination, [facility], 03/19/20 9:54:00 CDT  ?  2.?Hypertension?I10  ?  Essential Hypertension:?At goal per?JNC 8 guidelines.? No change in medication regimen. ?6-month prescription written-see below  ?   1. ?Advocate 100% compliance?with medication regimen?and?low-sodium diet  2. ?Advocate?increased?cardiovascular exercise as tolerated and educated upon  3.? 10% weight loss goal  4.? Monitor blood pressure?daily?with?an antecubital?digital?cuff  5. ?Follow-up in 6 months for?HTN visit  6. ?Future Lab: HTN labs in 6 months  ?  ?  ?  ?  Ordered:  amLODIPine, 10 mg = 1 tab(s), Oral, Daily, # 90 tab(s), 1 Refill(s), Pharmacy: [facility]/pharmacy #[id number], 626,  cm, Height/Length Dosing, 11/12/19 9:44:00 CST, 83.8, kg, Weight Dosing, 11/12/19 9:44:00 CST  hydrochlorothiazide-triamterene, See Instructions, TAKE 1/2 TABLET BY MOUTH ONCE A DAY, # 45 tab(s), 1 Refill(s), [id number] 152, cm, Height/Length Dosing, 11/12/19 9:44:00 CST, 83.8, kg, Weight Dosing, 11/12/19 9:44:00 CST  quinapril, 20 mg = 1 tab(s), Oral, BID, # 180 tab(s), 1 Refill(s), [id number] 152, cm, Height/Length Dosing, 11/12/19 9:44:00 CST, 83.8, kg, Weight Dosing, 11/12/19 9:44:00 CST  Clinic Follow up, *Est. 09/23/20 9:15:00 CDT, Order for future visit, Hypertension, [facility]  Comprehensive Metabolic Panel, Routine collect, 03/19/20 10:04:00 CDT, Blood, Stop date 03/19/20 10:05:00 CDT, Lab Collect, Hypertension, 03/19/20 10:04:00 CDT  Lipid Panel, Routine collect, 03/19/20 10:04:00 CDT, Blood, Stop date 03/19/20 10:05:00 CDT, Lab Collect, Hypertension  Hyperlipidemia, 03/19/20 10:04:00 CDT  Thyroid Stimulating Hormone, Routine collect, 03/19/20 10:04:00 CDT, Blood, Stop date 03/19/20 10:05:00 CDT, Lab Collect, Hypertension, 03/19/20 10:04:00 CDT  Urinalysis without Reflex, Routine collect, Urine, 03/19/20 10:04:00 CDT, Stop date 03/19/20 10:05:00 CDT, Nurse collect, Hypertension  ?  3.?Osteoporosis?M81.0  ?-Continue ibandronate?12-month prescription given  -Schedule DEXA scan  -Continue vitamin D and calcium supplementation  -Continue weight resistance exercises  Ordered:  ibandronate, See Instructions, TAKE 1 TABLET BY MOUTH EVERY MONTH, # 3 tab(s), 3 Refill(s), Pharmacy: [facility]/pharmacy #[id number], 152, cm, Height/Length Dosing, 11/12/19 9:44:00 CST, 83.8, kg, Weight Dosing, 11/12/19 9:44:00 CST  Schedule Diagnostics Study, dexa scan, next available, 03/19/20 9:57:00 CDT, Osteoporosis  ?  4.?Hyperlipidemia?E78.5  ??-Statin medication is?efficacious with no side effects  -Continue medication dosing with no change-12-month prescription given  -Low-cholesterol diet?given and  educated upon  -FLP?[id number]  Ordered:  colesevelam, 1,875 mg = 3 tab(s), Oral, BID, # 540 tab(s), 3 Refill(s), Pharmacy: [facility]/pharmacy #[id number], 152, cm, Height/Length Dosing, 11/12/19 9:44:00 CST, 83.8, kg, Weight Dosing, 11/12/19 9:44:00 CST  Lipid Panel, Routine collect, 03/19/20 10:04:00 CDT, Blood, Stop date 03/19/20 10:05:00 CDT, Lab Collect, Hypertension  Hyperlipidemia, 03/19/20 10:04:00 CDT  ?  Referrals  Clinic Follow up, *Est. 09/23/20 9:15:00 CDT, Order for future visit, Hypertension, [facility]   Problem List/Past Medical History  Ongoing  Acid reflux  Hyperlipidemia  Hypertension  Obesity  Osteoarthritis  Osteoporosis  primary bilary cirrhosis  Primary osteoarthritis of left knee  Rheumatoid arthritis  Wellness examination  Historical  No qualifying data  Procedure/Surgical History  Fusion of 2 or more Cervical Vertebral Joints with Autologous Tissue Substitute, Posterior Approach, Posterior Column, Open Approach (11/26/2018)  Fusion of 2 to 7 Thoracic Vertebral Joints with Autologous Tissue Substitute, Posterior Approach, Posterior Column, Open Approach (11/26/2018)  Fusion of Cervicothoracic Vertebral Joint with Autologous Tissue Substitute, Posterior Approach, Posterior Column, Open Approach (11/26/2018)  Posterior Cervical Fusion with O-arm (.) (11/26/2018)  Removal of Internal Fixation Device from Cervical Vertebra, Open Approach (11/26/2018)  Removal of Internal Fixation Device from Thoracic Vertebra, Open Approach (11/26/2018)  Procedure on spine (06/18/2018)  Replacement of Left Knee Joint with Synthetic Substitute, Cemented, Open Approach (05/09/2016)  Total Knee Arthroplasty (Left) (05/09/2016)  Bone density scan (2016)  Destruction of Left Lens, Percutaneous Approach (10/29/2015)  Discission of secondary membranous cataract (opacified posterior lens capsule and/or anterior hyaloid); laser surgery (eg, YAG laser) (1 or more stages) (10/29/2015)  Total knee replacement  (2013)  Colonoscopy (2010)  Appendectomy;  arthoscopy - left knee    Cholecystectomy  cyst removed right hand  lumbar fusion  removal of catract bilatterally  right knee replacement  Tonsillectomy   Medications  amLODIPine 10 mg oral tablet, 10 mg= 1 tab(s), Oral, Daily, 1 refills  Co-Q10, Oral  hydrochlorothiazide-triamterene 25 mg-37.5 mg oral tablet, See Instructions, 1 refills  ibandronate 150 mg oral tablet, See Instructions, 3 refills  quinapril 20 mg oral tablet, 20 mg= 1 tab(s), Oral, BID, 1 refills  tiZANidine 4 mg oral tablet, 4 mg= 1 tab(s), Oral, q8hr  traMADol 50 mg oral tablet, 50 mg= 1 tab(s), Oral, q8hr  Louann 250 mg oral tablet, 1 1/2 tab(s), Oral, TID  Welchol 625 mg oral tablet, 1875 mg= 3 tab(s), Oral, BID, 3 refills  Allergies  acetaminophen?(has primary cirrhosis)  Social History  Abuse/Neglect  No, 2019  Alcohol  Current, Liquor, Daily, 2016  Liquor, 2013  Substance Use - Denies Substance Abuse, 2013  Tobacco  Former smoker, quit more than 30 days ago, N/A, 2019  Former smoker, quit more than 30 days ago, N/A, 2019  Former smoker, N/A, 2018  Former smoker, N/A, 10/30/2018  Cigarettes, 2013  Family History  Diabetes mellitus type 2: Sister.  Hypertension: Negative: Mother and Father.  Primary malignant neoplasm of colon: Grandfather.  Immunizations  Vaccine Date Status   influenza virus vaccine, inactivated 2019 Given   influenza virus vaccine, inactivated 10/30/2018 Given   tetanus/diphtheria/pertussis, acel(Tdap) 10/30/2018 Given   influenza virus vaccine, inactivated  Recorded   zoster vaccine live 2015 Recorded   pneumococcal 23-polyvalent vaccine 2015 Recorded   pneumococcal 13-valent conjugate vaccine  Recorded   Health Maintenance  Health Maintenance  ???Pending?(in the next year)  ??? ??OverDue  ??? ? ? ?Pneumococcal Vaccine due??and every?  ??? ? ? ?Advance Directive due??20??and every  1??year(s)  ??? ? ? ?Cognitive Screening due??01/01/20??and every 1??year(s)  ??? ? ? ?Fall Risk Assessment due??01/01/20??and every 1??year(s)  ??? ? ? ?Functional Assessment due??01/01/20??and every 1??year(s)  ??? ? ? ?Geriatric Depression Screening due??01/01/20??and every 1??year(s)  ??? ??Due?  ??? ? ? ?ADL Screening due??03/19/20??and every 1??year(s)  ??? ??Due In Future?  ??? ? ? ?Hypertension Management-BMP not due until??05/08/20??and every 1??year(s)  ??? ? ? ?Aspirin Therapy for CVD Prevention not due until??05/09/20??and every 1??year(s)  ??? ? ? ?Hypertension Management-Education not due until??05/09/20??and every 1??year(s)  ??? ? ? ?Obesity Screening not due until??01/01/21??and every 1??year(s)  ???Satisfied?(in the past 1 year)  ??? ??Satisfied?  ??? ? ? ?Aspirin Therapy for CVD Prevention on??05/09/19.??Satisfied by Jos Briseno MD  ??? ? ? ?Blood Pressure Screening on??03/19/20.??Satisfied by Cande Gaffney  ??? ? ? ?Body Mass Index Check on??03/19/20.??Satisfied by Cande Gaffney  ??? ? ? ?Diabetes Screening on??05/09/19.??Satisfied by Jeff Mishra  ??? ? ? ?Hypertension Management-BMP on??05/09/19.??Satisfied by Jeff Mishra  ??? ? ? ?Hypertension Management-Blood Pressure on??03/19/20.??Satisfied by Cande Gaffney  ??? ? ? ?Hypertension Management-Education on??05/09/19.??Satisfied by Jos Briseno MD  ??? ? ? ?Influenza Vaccine on??11/12/19.??Satisfied by Amanda Lopez MA  ??? ? ? ?Medicare Annual Wellness Exam on??03/19/20.??Satisfied by Jos Briseno MD  ??? ? ? ?Obesity Screening on??03/19/20.??Satisfied by Cande Gaffney  ?

## 2022-05-02 NOTE — HISTORICAL [facility]ERN[facility]
This is a historical note converted from Kelly. Formatting and pictures may have been removed.  Please reference Kelly for original formatting and attached multimedia. Chief Complaint  6 WEEK F/U BILAT REV TSA, LIMITED ROM, L SHOULDER GIVES HER MORE PROBLEMS THAN R  History of Present Illness  3/31/2020: Right reverse total shoulder arthroplasty  4/3/2020: Left reverse total shoulder arthroplasty  ?   She returns today. She is working with therapy. ?Her left shoulder tends to give her more problems in the right. ?She has pain along the left humerus.  Physical Exam  Vitals & Measurements  T:?37? ?C (Oral)? HR:?96(Peripheral)? RR:?20? BP:?144/85?  HT:?157?cm? WT:?82.55?kg? BMI:?33.49?  Incisions are healed. ?Range of motion of the right shows forward flexion to 120 degrees, abduction 80 degrees, external Tatian?20 degrees.? Range of motion of the left shows forward flexion is 60 degrees abduction 60 degrees?30 degrees.  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 ?Continue therapy. ?I will see her back in 3 months with radiographs of bilateral shoulders  Ordered:  Clinic Follow up, *Est. 09/24/20 3:00:00 CDT, Order for future visit, Status post reverse arthroplasty of shoulder, Orthopaedics  Post-Op follow-up visit [id number] PC, Status post reverse arthroplasty of shoulder, Orthopaedics Clinic, 06/24/20 14:34:00 CDT  XR Shoulder Left Minimum 2 Views, Routine, 06/24/20 14:22:00 CDT, None, Patient Bed, Patient Has IV?, Rad Type, Status post reverse arthroplasty of shoulder, Not Scheduled, 06/24/20 14:22:00 CDT  XR Shoulder Right Minimum 2 Views, Routine, 06/24/20 14:22:00 CDT, None, Patient Bed, Patient Has IV?, Rad Type, Status post reverse arthroplasty of shoulder, Not Scheduled, 06/24/20 14:22:00 CDT  ?  Orders:  Home Health Recertification  PC, Age-related osteopor w/curr pathol fx of right humer w/routine heal  Essential (primary) hypertension   Hyperlipidemia  Biliary cirrhosis  Arthritis  Rheumatoid arthritis  Gastric reflux  Obesity, unspecified  Adult BMI 35.0-35.9 kg/sq m  H/O...  Referrals  Clinic Follow up, *Est. 09/24/20 3:00:00 CDT, Order for future visit, Status post reverse arthroplasty of shoulder, [facility]   Problem List/Past Medical History  Ongoing  Acid reflux  Hyperlipidemia  Hypertension  Obesity  Osteoarthritis  Osteoporosis  primary bilary cirrhosis  Primary osteoarthritis of left knee  Rheumatoid arthritis  Status post reverse arthroplasty of shoulder  Wellness examination  Historical  No qualifying data  Procedure/Surgical History  Replacement of Left Shoulder Joint with Reverse Ball and Socket Synthetic Substitute, Open Approach (04/03/2020)  Total Reverse Shoulder (Left) (04/03/2020)  Replacement of Right Shoulder Joint with Reverse Ball and Socket Synthetic Substitute, Open Approach (03/31/2020)  Total Reverse Shoulder (Right) (03/31/2020)  Repair Scalp Subcutaneous Tissue and Fascia, Percutaneous Approach (03/30/2020)  Fusion of 2 or more Cervical Vertebral Joints with Autologous Tissue Substitute, Posterior Approach, Posterior Column, Open Approach (11/26/2018)  Fusion of 2 to 7 Thoracic Vertebral Joints with Autologous Tissue Substitute, Posterior Approach, Posterior Column, Open Approach (11/26/2018)  Fusion of Cervicothoracic Vertebral Joint with Autologous Tissue Substitute, Posterior Approach, Posterior Column, Open Approach (11/26/2018)  Posterior Cervical Fusion with O-arm (.) (11/26/2018)  Removal of Internal Fixation Device from Cervical Vertebra, Open Approach (11/26/2018)  Removal of Internal Fixation Device from Thoracic Vertebra, Open Approach (11/26/2018)  Procedure on spine (06/18/2018)  Replacement of Left Knee Joint with Synthetic Substitute, Cemented, Open Approach (05/09/2016)  Total Knee Arthroplasty (Left) (05/09/2016)  Bone density scan (2016)  Destruction of Left Lens, Percutaneous Approach  (10/29/2015)  Discission of secondary membranous cataract (opacified posterior lens capsule and/or anterior hyaloid); laser surgery (eg, YAG laser) (1 or more stages) (10/29/2015)  Total knee replacement (2013)  Colonoscopy (2010)  Appendectomy;  arthoscopy - left knee    Cholecystectomy  cyst removed right hand  lumbar fusion  removal of catract bilatterally  right knee replacement  Tonsillectomy   Medications  acetaminophen-hydrocodone 325 mg-5 mg oral tablet, 1 tab(s), Oral, q6hr, PRN  Acidophilus Probiotic Blend, 10 billion cell, Oral, Daily  amLODIPine 10 mg oral tablet, 10 mg= 1 tab(s), Oral, Daily, 1 refills  ascorbic acid 500 mg oral tablet, 500 mg= 1 tab(s), Oral, At Bedtime  aspirin 81 mg oral tablet, 81 mg= 1 tab(s), Oral, Daily  Co-Q10, 1 tab, Oral, Daily  hydrochlorothiazide-triamterene 25 mg-37.5 mg oral tablet, 0.5 tab(s), Oral, Daily  ibandronate 150 mg oral tablet, See Instructions, 3 refills  methocarbamol 500 mg oral tablet, 500 mg= 1 tab(s), Oral, TID  Neurontin 300 mg oral capsule, 300 mg= 1 cap(s), Oral, At Bedtime  Oystercal-D, 1 tab(s), Oral, BID  polyethylene glycol 3350 oral powder for reconstitution, Oral, Daily  quinapril 20 mg oral tablet, 20 mg= 1 tab(s), Oral, BID, 1 refills  Senokot S 50 mg-8.6 mg oral tablet, 2 tab(s), Oral, BID  tiZANidine 4 mg oral tablet, 4 mg= 1 tab(s), Oral, q8hr  Ultram 50 mg oral tablet, 50 mg= 1 tab(s), Oral, q6hr, PRN,? ?Not Taking, Completed Rx  Louann 250 mg oral tablet, 1000 mg= 4 tab(s), Oral, BID  Welchol 625 mg oral tablet, 1875 mg= 3 tab(s), Oral, BID, 3 refills  Allergies  acetaminophen?(has primary cirrhosis)  Social History  Abuse/Neglect  No, 2020  Alcohol  Current, Liquor, Daily, 2016  Liquor, 2013  Substance Use - Denies Substance Abuse, 2013  Tobacco  Former smoker, quit more than 30 days ago, No, 2020  Family History  Diabetes mellitus type 2: Sister.  Hypertension: Negative: Mother and  Father.  Primary malignant neoplasm of colon: Grandfather.  Immunizations  Vaccine Date Status   influenza virus vaccine, inactivated 11/12/2019 Given   influenza virus vaccine, inactivated 10/30/2018 Given   tetanus/diphtheria/pertussis, acel(Tdap) 10/30/2018 Given   influenza virus vaccine, inactivated 2017 Recorded   zoster vaccine live 12/29/2015 Recorded   pneumococcal 23-polyvalent vaccine 09/28/2015 Recorded   pneumococcal 13-valent conjugate vaccine 2014 Recorded   Health Maintenance  Health Maintenance  ???Pending?(in the next year)  ??? ??OverDue  ??? ? ? ?Pneumococcal Vaccine due??and every?  ??? ? ? ?Advance Directive due??01/01/20??and every 1??year(s)  ??? ? ? ?Hypertension Management-Education due??05/09/20??and every 1??year(s)  ??? ??Due In Future?  ??? ? ? ?Cognitive Screening not due until??01/01/21??and every 1??year(s)  ??? ? ? ?Fall Risk Assessment not due until??01/01/21??and every 1??year(s)  ??? ? ? ?Functional Assessment not due until??01/01/21??and every 1??year(s)  ??? ? ? ?Obesity Screening not due until??01/01/21??and every 1??year(s)  ??? ? ? ?Medicare Annual Wellness Exam not due until??03/19/21??and every 1??year(s)  ??? ? ? ?Hypertension Management-BMP not due until??04/04/21??and every 1??year(s)  ??? ? ? ?Aspirin Therapy for CVD Prevention not due until??04/06/21??and every 1??year(s)  ??? ? ? ?Hypertension Management-Blood Pressure not due until??04/15/21??and every 1??year(s)  ??? ? ? ?ADL Screening not due until??04/15/21??and every 1??year(s)  ???Satisfied?(in the past 1 year)  ??? ??Satisfied?  ??? ? ? ?ADL Screening on??04/15/20.??Satisfied by Sophie Crawford  ??? ? ? ?Aspirin Therapy for CVD Prevention on??04/06/20.??Satisfied by Luly Wahl RN  ??? ? ? ?Blood Pressure Screening on??06/24/20.??Satisfied by Teresa Gonzáles  ??? ? ? ?Body Mass Index Check on??06/24/20.??Satisfied by Teresa Gonzáles  ??? ? ? ?Diabetes Maintenance-Fasting Lipid Profile  on??03/19/20.??Satisfied by Noemi Gonzáles  ??? ? ? ?Diabetes Screening on??04/04/20.??Satisfied by Valerie Rawls  ??? ? ? ?Fall Risk Assessment on??06/24/20.??Satisfied by Teresa Gonzáles  ??? ? ? ?Functional Assessment on??04/06/20.??Satisfied by Luly Wahl RN  ??? ? ? ?Hypertension Management-Blood Pressure on??06/24/20.??Satisfied by Teresa Gonzáles  ??? ? ? ?Influenza Vaccine on??11/12/19.??Satisfied by Amanda Lopez MA  ??? ? ? ?Lipid Screening on??03/19/20.??Satisfied by Noemi Gonzáles  ??? ? ? ?Medicare Annual Wellness Exam on??03/19/20.??Satisfied by Jos Briseno MD  ??? ? ? ?Obesity Screening on??06/24/20.??Satisfied by Teresa Gonzáles  ?  Diagnostic Results  Bilateral shoulder radiographs show appropriate implant position

## 2022-05-02 NOTE — HISTORICAL [facility]
This is a historical note converted from Kelly. Formatting and pictures may have been removed.  Please reference Kelly for original formatting and attached multimedia. Chief Complaint  Pt here for 7 month s/p dwight rev TSA on 4/3/2020. Pt states right shoulder has been really painful, physical therapy is going well, but did not go Monday because shoulder pain was unbearable, takes advil for pain..NG  History of Present Illness  3/31/2020: Right reverse total shoulder arthroplasty  4/3/2020: Left reverse total shoulder arthroplasty  ?   She returns today. She had been making good progress with therapy but is noticed a increasing right shoulder pain?since November 2020. ?She denies any injury. ?She is using Advil intermittently.  Review of Systems  Comprehensive review of system?was performed with no exceptions other than noted in the history of present illness [1]  Physical Exam  Vitals & Measurements  T:?98.7? ?F (Oral)? HR:?77(Peripheral)? BP:?159/80?  HT:?157.00?cm? WT:?82.550?kg? BMI:?33.49?  Gen: WN, WD, NAD  Card/Res: NL breathing, +distal pulses  Abdomen: ND  Shoulder Exam:??????????Right??????????Left  Skin:??????????????????????????????Normal???????Normal  AC joint tenderness:??????????None??????????None  Forward Flexion:?????????????120 ??????????140  Abduction:?????????????????????100??????????? 100  External Rotation:?????????????? 40????????????40  Internal Rotation?????????????? 80 ???????????? 80  ?  ?   N-V status:?????????????????????????Intact??????????Intact  ?   C-spine: Normal ROM, NT  Assessment/Plan  1.?Status post reverse arthroplasty of shoulder?Z96.619  ?Radiographs are reassuring. ?Like for her to rest over the next couple weeks. ?I will see her back as scheduled in March  Ordered:  Office/Outpatient Visit Level 3 Established [id number] PC, Status post reverse arthroplasty of shoulder, [facility], 11/11/20 12:02:00 CST  XR Shoulder Right Minimum 2 Views, Routine, 11/11/20 11:33:00  CST, None, Patient Bed, Patient Has IV?, Rad Type, Status post reverse arthroplasty of shoulder, Not Scheduled, 11/11/20 11:33:00 CST  ?   Problem List/Past Medical History  Ongoing  Acid reflux  Hyperlipidemia  Hypertension  Obesity  Osteoarthritis  Osteoporosis  primary bilary cirrhosis  Primary osteoarthritis of left knee  Rheumatoid arthritis  Status post reverse arthroplasty of shoulder  Wellness examination  Historical  No qualifying data  Procedure/Surgical History  Replacement of Left Shoulder Joint with Reverse Ball and Socket Synthetic Substitute, Open Approach (04/03/2020)  Total Reverse Shoulder (Left) (04/03/2020)  Replacement of Right Shoulder Joint with Reverse Ball and Socket Synthetic Substitute, Open Approach (03/31/2020)  Total Reverse Shoulder (Right) (03/31/2020)  Repair Scalp Subcutaneous Tissue and Fascia, Percutaneous Approach (03/30/2020)  Fusion of 2 or more Cervical Vertebral Joints with Autologous Tissue Substitute, Posterior Approach, Posterior Column, Open Approach (11/26/2018)  Fusion of 2 to 7 Thoracic Vertebral Joints with Autologous Tissue Substitute, Posterior Approach, Posterior Column, Open Approach (11/26/2018)  Fusion of Cervicothoracic Vertebral Joint with Autologous Tissue Substitute, Posterior Approach, Posterior Column, Open Approach (11/26/2018)  Posterior Cervical Fusion with O-arm (.) (11/26/2018)  Removal of Internal Fixation Device from Cervical Vertebra, Open Approach (11/26/2018)  Removal of Internal Fixation Device from Thoracic Vertebra, Open Approach (11/26/2018)  Procedure on spine (06/18/2018)  Replacement of Left Knee Joint with Synthetic Substitute, Cemented, Open Approach (05/09/2016)  Total Knee Arthroplasty (Left) (05/09/2016)  Bone density scan (2016)  Destruction of Left Lens, Percutaneous Approach (10/29/2015)  Discission of secondary membranous cataract (opacified posterior lens capsule and/or anterior hyaloid); laser surgery (eg, YAG laser) (1 or more  stages) (10/29/2015)  Total knee replacement (2013)  Colonoscopy (2010)  Appendectomy;  arthoscopy - left knee    Cholecystectomy  cyst removed right hand  lumbar fusion  removal of catract bilatterally  right knee replacement  Tonsillectomy   Medications  acetaminophen-hydrocodone 325 mg-5 mg oral tablet, 1 tab(s), Oral, q6hr, PRN  Acidophilus Probiotic Blend, 10 billion cell, Oral, Daily  amLODIPine 10 mg oral tablet, 10 mg= 1 tab(s), Oral, Daily, 1 refills  ascorbic acid 500 mg oral tablet, 500 mg= 1 tab(s), Oral, At Bedtime  aspirin 81 mg oral tablet, 81 mg= 1 tab(s), Oral, Daily  Co-Q10, 1 tab, Oral, Daily  escitalopram 5 mg oral tablet, 5 mg= 1 tab(s), Oral, Daily, 5 refills  hydrochlorothiazide-triamterene 25 mg-37.5 mg oral tablet, See Instructions  ibandronate 150 mg oral tablet, See Instructions  methocarbamol 500 mg oral tablet, 500 mg= 1 tab(s), Oral, TID  Neurontin 300 mg oral capsule, 300 mg= 1 cap(s), Oral, At Bedtime  Oystercal-D, 1 tab(s), Oral, BID  polyethylene glycol 3350 oral powder for reconstitution, Oral, Daily  quinapril 20 mg oral tablet, 20 mg= 1 tab(s), Oral, BID, 1 refills  Senokot S 50 mg-8.6 mg oral tablet, 2 tab(s), Oral, BID  tiZANidine 4 mg oral tablet, 4 mg= 1 tab(s), Oral, q8hr  Ultram 50 mg oral tablet, 50 mg= 1 tab(s), Oral, q6hr, PRN  Louann 250 mg oral tablet, 1000 mg= 4 tab(s), Oral, BID  Welchol 625 mg oral tablet, 1875 mg= 3 tab(s), Oral, BID, 3 refills  Allergies  acetaminophen?(has primary cirrhosis)  Social History  Abuse/Neglect  No, No, Yes, 2020  Alcohol  Current, Liquor, Daily, 2016  Liquor, 2013  Substance Use - Denies Substance Abuse, 2013  Tobacco  Former smoker, quit more than 30 days ago, No, 2020  Family History  Diabetes mellitus type 2: Sister.  Hypertension: Negative: Mother and Father.  Primary malignant neoplasm of colon: Grandfather.  Immunizations  Vaccine Date Status Comments   influenza virus vaccine,  inactivated 10/01/2020 Given    influenza virus vaccine, inactivated - Not Given Already had disease     influenza virus vaccine, inactivated 11/12/2019 Given    influenza virus vaccine, inactivated 10/30/2018 Given    tetanus/diphtheria/pertussis, acel(Tdap) 10/30/2018 Given    influenza virus vaccine, inactivated 2017 Recorded    zoster vaccine live 12/29/2015 Recorded    pneumococcal 23-polyvalent vaccine 09/28/2015 Recorded    pneumococcal 13-valent conjugate vaccine 2014 Recorded    Health Maintenance  Health Maintenance  ???Pending?(in the next year)  ??? ??OverDue  ??? ? ? ?Advance Directive due??01/02/20??and every 1??year(s)  ??? ? ? ?Cognitive Screening due??01/02/20??and every 1??year(s)  ??? ? ? ?Hypertension Management-Education due??05/09/20??and every 1??year(s)  ??? ??Due?  ??? ? ? ?Zoster Vaccine due??11/11/20??Unknown Frequency  ??? ??Due In Future?  ??? ? ? ?Obesity Screening not due until??01/01/21??and every 1??year(s)  ??? ? ? ?Fall Risk Assessment not due until??01/02/21??and every 1??year(s)  ??? ? ? ?Functional Assessment not due until??01/02/21??and every 1??year(s)  ??? ? ? ?Medicare Annual Wellness Exam not due until??03/19/21??and every 1??year(s)  ??? ? ? ?Aspirin Therapy for CVD Prevention not due until??04/06/21??and every 1??year(s)  ??? ? ? ?ADL Screening not due until??04/15/21??and every 1??year(s)  ??? ? ? ?Depression Screening not due until??09/23/21??and every 1??year(s)  ??? ? ? ?Hypertension Management-BMP not due until??09/23/21??and every 1??year(s)  ??? ? ? ?Influenza Vaccine not due until??10/01/21??and every 1??day(s)  ???Satisfied?(in the past 1 year)  ??? ??Satisfied?  ??? ? ? ?ADL Screening on??04/15/20.??Satisfied by Sophie Crawford  ??? ? ? ?Aspirin Therapy for CVD Prevention on??04/06/20.??Satisfied by Luly Wahl RN  ??? ? ? ?Blood Pressure Screening on??11/11/20.??Satisfied by Juan Perez  ??? ? ? ?Body Mass Index Check on??11/11/20.??Satisfied by  Juan Perez  ??? ? ? ?Depression Screening on??09/23/20.??Satisfied by Mariana Back NP  ??? ? ? ?Diabetes Maintenance-Fasting Lipid Profile on??03/19/20.??Satisfied by Noemi Gonzáles  ??? ? ? ?Diabetes Screening on??09/23/20.??Satisfied by Noemi Gonzáles  ??? ? ? ?Fall Risk Assessment on??11/11/20.??Satisfied by Juan Perez  ??? ? ? ?Functional Assessment on??11/11/20.??Satisfied by Juan Perez  ??? ? ? ?Hypertension Management-Blood Pressure on??11/11/20.??Satisfied by Juan Perez  ??? ? ? ?Influenza Vaccine on??10/01/20.??Satisfied by Amanda Lopez MA  ??? ? ? ?Lipid Screening on??03/19/20.??Satisfied by Noemi Gonzáles  ??? ? ? ?Medicare Annual Wellness Exam on??03/19/20.??Satisfied by Jos Briseno MD  ??? ? ? ?Obesity Screening on??11/11/20.??Satisfied by Juan Perez  ?  Diagnostic Results  Shoulder radiographs show appropriate implant position     [1]?Office Visit Note; Balwinder LAWSON MD, Chadwick BARRAGAN 09/21/2020 11:06 CDT

## 2022-05-02 NOTE — HISTORICAL [facility]
This is a historical note converted from Kelly. Formatting and pictures may have been removed.  Please reference Kelly for original formatting and attached multimedia. Chief Complaint  R LEG/BACK PAIN  History of Present Illness  The patient is a 77 year old?white female who presents today with symptoms of musculoskeletal pain?located?in the area?of the right lumbar spine and?right anterior lateral thigh.? This?is a acute issue.??Quality is all ache with burning.? Severity is?8 out of 10.??Duration of symptoms?is 4 months. Symptoms are stent.? The patient report that if?weakness. ?The patient reports?no focal neurologic deficits. ?The patient reports no?numbness. ?Injury was not reported.??Associated symptoms include shooting pain down the?anterior lateral thigh.? Alleviating and aggrevating factors include,?alleviating?laying down and resting, aggravating?sitting?or activity.? Prior treatment?over-the-counter has been?with?recent greater trochanteric bursa injection, Medrol Dosepak ?1, and as needed diclofenac 75 mg use..?The patient did not tolerate home exercises given last visit very well.  ?   Of note,?greater trochanteric bursitis completely resolved status post injection on February 6, 2018.  Review of Systems  Constitutional:?no weight gain,?no weight loss,?no fatigue,?no fever,?no chills,?no weakness,?no trouble sleeping.  Eyes:?no vision loss/changes,?no glasses or contacts,?no pain,?no redness,?no blurry or double vision,?no flashing lights,?no specks,?no glaucoma,?no cataracts.  Last eye exam:?within last 6 months  Head:?no headache,?no head injury,?no neck pain.?  Neck:??no lumps,?no swollen glands,?no stiffness.  Cardiovascular:?no chest pain or discomfort,?no tightness,?no palpitations,?no SOB with activity,?no difficulty breathing while supine,?no swelling,?no sudden awakening from sleep with SOB.  Respiratory:??no cough,?no sputum,?no coughing up blood,?no SOB,?no wheezing,?no painful  breathing.  Musculoskeletal:?muscle or joint pain,?no stiffness,?back pain,?no redness of joints,?no swelling of joints,?no trauma.  Skin:?no rashes,?no lumps,?no itching,?no dryness,?color normal for ethnicity,?no hair or nail changes.  Neurologic:?no dizziness,?no fainting,?no seizures,?weakness,?no numbness,?no tingling,?no tremors.  Hematologic:?no ease of bruising,?no ease of bleeding.  ?  ?  Physical Exam  Vitals & Measurements  BP:?144/82?  HT:?154.94?cm? HT:?154.34?cm? WT:?79.6?kg? WT:?79.6?kg? BMI:?33.42?  VITAL SIGNS:? Reviewed.?Within normal limits.  GENERAL:?In no apparent distress.? Alert and Oriented x3  CHEST:? Chest withclear breath sounds bilaterally.?No wheezes,No rales,No rhonchi.?Good air movement  CARDIAC:?Regular rate and rhythm.?S1 and S2,without murmurs,gallops, orrubs.  ABDOMEN:?Soft.Nontender.?No sign of distention.?No reboundorguarding, andno massespalpated.? ?Bowel Sounds present and normal x 4.  Urologic:No CVA tenderness.No suprapubic tenderness.  MUSCULOSKELETAL:?Decreased ROM secondary to pain.?5/5 strength throughout.?Extremities without clubbing,cyanosisoredema.??Negative straight leg exam bilaterally.? Tenderness to palpation in the right lumbar paraspinal muscles.? Greater trochanteric bursa tenderness to palpation this visit.  NEUROLOGIC EXAM:??No focal sensory or strength deficits.?Speech normal.?Follows commands.? Tendon reflexes noted to be?2+ throughout.  PSYCHIATRIC:? Moodnormal.?Linear.Lucid.Normal affect.No response to internal stimuli.MMSE essentially wnl.  SKIN:?No rashorlesions.  Assessment/Plan  1.?Lumbar back pain  ?-Acute on chronic symptoms. ?History of a lumbar fusion with  in the past.  -I advised her to continue her diclofenac?as needed.? Also,?ice and topical analgesics.  -x-ray shows advanced degenerative disc disease and degenerative joint disease.  Ordered:  Office/Outpatient Visit Level 4 Established [id number] PC, Lumbar back pain  Lumbar  radiculopathy, [facility], 18 16:32:00 CST  Schedule Diagnostics Study, See Order(s), As soon as practical, 18 16:32:00 CST  XR Spine Lumbar 2 or 3 Views, Routine, 18 16:09:00 CST, Back Pain, None, Patient Bed, Patient Has IV?, Rad Type, Lumbar back pain  Lumbar radiculopathy, [facility], 18 16:09:00 CST  ?  2.?Lumbar radiculopathy  ?Persistent radiculopathy with intermittent weakness.? Advanced imaging is validated at this time due to symptom quality, severity, and duration.? We will move forward with?MRI of the lumbar spine without contrast.? Referral would be pending?MRI results.  Ordered:  Office/Outpatient Visit Level 4 Established [id number] PC, Lumbar back pain  Lumbar radiculopathy, [facility], 18 16:32:00 CST  Schedule Diagnostics Study, See Order(s), As soon as practical, 18 16:32:00 CST  XR Spine Lumbar 2 or 3 Views, Routine, 18 16:09:00 CST, Back Pain, None, Patient Bed, Patient Has IV?, Rad Type, Lumbar back pain  Lumbar radiculopathy, [facility], 18 16:09:00 CST  ?  Orders:  Clinic Follow-up PRN, 18 16:32:00 CST, [facility], Future Order   Problem List/Past Medical History  Ongoing  Acid reflux  Hyperlipidemia  Obesity  Osteoarthritis  primary bilary cirrhosis  Primary osteoarthritis of left knee  Rheumatoid arthritis  Historical  Hypertension  Procedure/Surgical History  Replacement of Left Knee Joint with Synthetic Substitute, Cemented, Open Approach (2016), Total Knee Arthroplasty (Left) (2016), Destruction of Left Lens, Percutaneous Approach (10/29/2015), Discission of secondary membranous cataract (opacified posterior lens capsule and/or anterior hyaloid); laser surgery (eg, YAG laser) (1 or more stages) (10/29/2015), Total knee replacement (2013), Appendectomy;, arthoscopy - left knee, , Cholecystectomy, cyst removed right hand, lumbar fusion, removal of catract  bilatterally, right knee replacement, Tonsillectomy.  Medications  Accupril 20 mg oral tablet, 20 mg= 1 tab(s), Oral, BID  AMLODIPINE BESYLATE 10 MG TAB, 10 mg= 1 tab(s), Oral, Daily  Boniva 150 mg oral tablet, 150 mg= 1 tab(s), Oral, qMonth  Calan  mg oral tablet, extended release, 180 mg= 1 tab(s), Oral, BID  Colace 100 mg oral capsule, 100 mg= 1 cap(s), Oral, BID  DICLOFENAC SOD DR 75 MG TAB, 75 mg= 1 tab(s), Oral, BID  Ecotrin 325 mg oral enteric coated tablet, 325 mg= 1 tab(s), Oral, Daily  hydrochlorothiazide-triamterene 25 mg-37.5 mg oral capsule, 1 cap(s), Oral, Daily, PRN  QUINAPRIL 40 MG TABLET  TRIAMTERENE-HCTZ 37.5-25 MG TB, 0.5 tab(s), Oral, Daily  Louann 250 mg oral tablet, 1 1/2 tab(s), Oral, TID  Welchol 625 mg oral tablet, 1875 mg= 3 tab(s), Oral, BID  Allergies  acetaminophen?(has primary cirrhosis)  Social History  Alcohol  Current, Liquor, Daily, 05/09/2016  Liquor, 07/30/2013  Substance Abuse - Denies Substance Abuse, 07/30/2013  Tobacco  Cigarettes, 07/30/2013  Family History  Hypertension: Negative: Mother and Father.

## 2022-05-02 NOTE — HISTORICAL [facility] CERNER
This is a historical note converted from [employer]. Formatting and pictures may have been removed.  Please reference Kelly for original formatting and attached multimedia. Chief Complaint  back pain  History of Present Illness  The patient is a?79 year old?female who presents today with symptoms of musculoskeletal pain located in the area of the?lower back.?This is an acute/chronic issue. Quality is constant ache. Severity is [7/10]. Duration of symptoms is 2 weeks. Symptoms are [constant]. The patient reports [no] weakness. The patient reports [no] focal neurologic deficits. The patient reports?some numbness and tingling that comes and goes, mainly on the left side. Injury was [not reported- however states did slide down when getting out of a vehicle approximately 3months ago, did not cause any pain or discomfort at the time- this is the only thing she can think of that would be of significance]. Associated symptoms include sitting/walking/laying, nothing is comfortable at present. Other providers the patient has seen for this issue include gopal in May of 2018- Saw Dr. Blas however was also having issues with her neck at this time and this took precedence- did receive one spinal injection which did give her relief, however she states symptoms are different. Last episode of pain that she was seen for was on her right side, now currently on her left side. No loss of bladder/bowels.  Review of Systems  Constitutional:?no weight gain,?no weight loss,?no fatigue,?no fever,?no chills,?no weakness,?no trouble sleeping.  Cardiovascular:?no chest pain or discomfort,?no tightness,?no palpitations,?no SOB with activity,?no difficulty breathing while supine,?no swelling,?no sudden awakening from sleep with SOB.  Respiratory:??no cough,?no sputum,?no coughing up blood,?no SOB,?no wheezing,?no painful breathing.  Musculoskeletal:?muscle or joint pain,?stiffness,?back pain,?no redness of joints,?no swelling of joints,?no  trauma.  Skin:?no rashes,?no lumps,?no itching,?no dryness,?color normal for ethnicity,?no hair or nail changes.  Neurologic:?no dizziness,?no fainting,?no seizures,?-?weakness- occ?comes and?goes?no numbness,?no tingling,?no tremors.  Physical Exam  Vitals & Measurements  BP:?140/84?  HT:?152?cm? WT:?83.8?kg? BMI:?36.27?  General- In NAD, A&O x 4, ambulating with cane  ?   Respiratory- CTA, No wheezing, No crackles, No rhonchi  ?   Cardiovascular- RRR W/O MGR, Pulses equal throughout  ?   Musculoskeletal-?+ tenderness across lumbar area, Joints WNL, FROM, Neg SLR, No CCE  ?   Integumentary- Warm, dry, intact, No lesions/rashes/hives  Assessment/Plan  1.?Need for vaccination?Z23  1. HD flu today  Ordered:  Influenza Virus Vaccine, Inactivated, 0.5 mL, IM, Once-Unscheduled, first dose 11/12/19 10:28:00 CST  ?  2.?Lumbar pain?M54.5  1.?XRay- Spondylosis along with DDD noted on XRay- final reading per radiologist- pending reading may consider MRI if needed  2. Will??also refer to Dr. Blas for f/u as well  3. MDP as directed  4. Wishes Aleve OTC as directed  5. Ok to continue muscle relaxers as directed- also ok to use Ultram for break through if needed- has at home and uses sparingly  6. F/U OV for any changes or worsening in symptoms  Ordered:  External Referral, lumbar pain, ortho, Dr. French, Saw last year May 2018 for back issues, 11/12/19 10:51:00 CST, Lumbar pain  Office/Outpatient Visit Level 4 Established 90561 PC, Lumbar pain, [facility], 11/12/19 10:51:00 CST  ?  Orders:  methylPREDNISolone, = 1 packet(s), Oral, As Directed, as directed on package labeling, X 6 day(s), # 21 tab(s), 0 Refill(s), Pharmacy: [facility]/pharmacy #[id number]  Clinic Follow-up PRN, 11/12/19 10:51:00 CST, [facility], Future Order  XR Spine Lumbar 2 or 3 Views, Routine, 11/12/19 10:27:00 CST, Other (please specify), None, Patient Bed, Patient Has IV?, Rad Type, Back pain, [facility], 11/12/19 10:27:00  CST  Referrals  External Referral, lumbar pain, ortho, Dr. French, Saw last year May 2018 for back issues, 19 10:51:00 CST, Lumbar pain  Clinic Follow-up PRN, 19 10:51:00 CST, [facility] - [facility], Future Order   Problem List/Past Medical History  Ongoing  Acid reflux  Hyperlipidemia  Hypertension  Obesity  Osteoarthritis  Osteoporosis  primary bilary cirrhosis  Primary osteoarthritis of left knee  Rheumatoid arthritis  Wellness examination  Historical  No qualifying data  Procedure/Surgical History  Fusion of 2 or more Cervical Vertebral Joints with Autologous Tissue Substitute, Posterior Approach, Posterior Column, Open Approach (2018)  Fusion of 2 to 7 Thoracic Vertebral Joints with Autologous Tissue Substitute, Posterior Approach, Posterior Column, Open Approach (2018)  Fusion of Cervicothoracic Vertebral Joint with Autologous Tissue Substitute, Posterior Approach, Posterior Column, Open Approach (2018)  Posterior Cervical Fusion with O-arm (.) (2018)  Removal of Internal Fixation Device from Cervical Vertebra, Open Approach (2018)  Removal of Internal Fixation Device from Thoracic Vertebra, Open Approach (2018)  Procedure on spine (2018)  Replacement of Left Knee Joint with Synthetic Substitute, Cemented, Open Approach (2016)  Total Knee Arthroplasty (Left) (2016)  Bone density scan ()  Destruction of Left Lens, Percutaneous Approach (10/29/2015)  Discission of secondary membranous cataract (opacified posterior lens capsule and/or anterior hyaloid); laser surgery (eg, YAG laser) (1 or more stages) (10/29/2015)  Total knee replacement (2013)  Colonoscopy (2010)  Appendectomy;  arthoscopy - left knee    Cholecystectomy  cyst removed right hand  lumbar fusion  removal of catract bilatterally  right knee replacement  Tonsillectomy   Medications  amLODIPine 10 mg oral tablet, 10 mg= 1 tab(s), Oral, Daily, 1 refills  Co-Q10,  Oral  escitalopram 5 mg oral tablet, See Instructions  hydrochlorothiazide-triamterene 25 mg-37.5 mg oral tablet, See Instructions, 1 refills  ibandronate 150 mg oral tablet, See Instructions, 3 refills  Medrol Dosepak 4 mg oral tablet, 1 packet(s), Oral, As Directed  quinapril 20 mg oral tablet, See Instructions, 1 refills  Robaxin 500 mg oral tablet, 750 mg= 1.5 tab(s), Oral, q8hr, PRN,? ?Not taking  tiZANidine 4 mg oral tablet, 4 mg= 1 tab(s), Oral, q8hr  traMADol 50 mg oral tablet, 50 mg= 1 tab(s), Oral, q8hr  Louann 250 mg oral tablet, 1 1/2 tab(s), Oral, TID  Welchol 625 mg oral tablet, 1875 mg= 3 tab(s), Oral, BID, 3 refills  Allergies  acetaminophen?(has primary cirrhosis)  Social History  Abuse/Neglect  No, 11/12/2019  Alcohol  Current, Liquor, Daily, 05/09/2016  Liquor, 07/30/2013  Substance Use - Denies Substance Abuse, 07/30/2013  Tobacco  Former smoker, quit more than 30 days ago, N/A, 11/12/2019  Former smoker, quit more than 30 days ago, N/A, 05/09/2019  Former smoker, N/A, 11/26/2018  Former smoker, N/A, 10/30/2018  Cigarettes, 07/30/2013  Family History  Diabetes mellitus type 2: Sister.  Hypertension: Negative: Mother and Father.  Primary malignant neoplasm of colon: Grandfather.  Immunizations  Vaccine Date Status   influenza virus vaccine, inactivated 11/12/2019 Given   influenza virus vaccine, inactivated 10/30/2018 Given   tetanus/diphtheria/pertussis, acel(Tdap) 10/30/2018 Given   influenza virus vaccine, inactivated 2017 Recorded   zoster vaccine live 12/29/2015 Recorded   pneumococcal 23-polyvalent vaccine 09/28/2015 Recorded   pneumococcal 13-valent conjugate vaccine 2014 Recorded   Health Maintenance  Health Maintenance  ???Pending?(in the next year)  ??? ??OverDue  ??? ? ? ?Pneumococcal Vaccine due??and every?  ??? ? ? ?Advance Directive due??01/01/19??and every 1??year(s)  ??? ? ? ?Cognitive Screening due??01/01/19??and every 1??year(s)  ??? ? ? ?Fall Risk Assessment due??01/01/19??and  every 1??year(s)  ??? ? ? ?Functional Assessment due??01/01/19??and every 1??year(s)  ??? ? ? ?Geriatric Depression Screening due??01/01/19??and every 1??year(s)  ??? ??Due?  ??? ? ? ?ADL Screening due??11/12/19??and every 1??year(s)  ??? ??Due In Future?  ??? ? ? ?Obesity Screening not due until??01/01/20??and every 1??year(s)  ??? ? ? ?Hypertension Management-BMP not due until??05/08/20??and every 1??year(s)  ??? ? ? ?Aspirin Therapy for CVD Prevention not due until??05/09/20??and every 1??year(s)  ??? ? ? ?Hypertension Management-Education not due until??05/09/20??and every 1??year(s)  ??? ? ? ?Hypertension Management-Blood Pressure not due until??11/11/20??and every 1??year(s)  ???Satisfied?(in the past 1 year)  ??? ??Satisfied?  ??? ? ? ?Aspirin Therapy for CVD Prevention on??05/09/19.??Satisfied by Jos Briseno MD  ??? ? ? ?Blood Pressure Screening on??11/12/19.??Satisfied by Amanda Lopez MA  ??? ? ? ?Body Mass Index Check on??11/12/19.??Satisfied by Amanda Lopez MA  ??? ? ? ?Diabetes Screening on??05/09/19.??Satisfied by Jeff Mishra  ??? ? ? ?Fall Risk Assessment on??11/30/18.??Satisfied by Tobin POSEY, David Valladares  ??? ? ? ?Hypertension Management-Blood Pressure on??11/12/19.??Satisfied by Amanda Lopez MA  ??? ? ? ?Influenza Vaccine on??11/12/19.??Satisfied by Amanda Lopez MA  ??? ? ? ?Obesity Screening on??11/12/19.??Satisfied by Amanda Lopez MA  ?      The?physician?is present within?the office with the?nurse practitioner.??The?office visit?documentation and management have been?reviewed and agreed upon.? I will continue to follow?this patient along with?the nurse practitioner?for current and future care.

## 2022-05-02 NOTE — HISTORICAL [facility] CERN[facility]
This is a historical note converted from [employer]. Formatting and pictures may have been removed.  Please reference Cerrobert for original formatting and attached multimedia. Chief Complaint  6 mth ov  History of Present Illness  The patient is a?80 year?old?female. Here in clinic for evaluation of hypertension. The patient reports home blood pressures of 130s/70s. The patient reports 100% compliance with medication. The patient reports no side effects with medication use. The patient reports following a low sodium diet and reports some cardiovascular exercise implementation- doing a lot at PT and at home. There is no chest pain or shortness of breath reported with exercise.?Has been monitoring her diet as her activity had been limited due to past fall- bilateral shoulders fractured and required surgery- currently doing much better but has been having to be more mindful of diet, taking Welchol and CoQ10 without any issues or concerns.  Currently anxiety has been well controlled with meds, tolerating well without any s/e, wishes to continue without any adjustments  Review of Systems  Constitutional:?no weight gain,?no weight loss,?no fatigue,?no fever,?no chills,?no weakness,?no trouble sleeping.  Cardiovascular:?no chest pain or discomfort,?no tightness,?no palpitations,?no SOB with activity,?no difficulty breathing while supine,?no swelling,?no sudden awakening from sleep with SOB.  Respiratory:??no cough,?no sputum,?no coughing up blood,?no SOB,?no wheezing,?no painful breathing.  Physical Exam  Vitals & Measurements  BP:?138/84?  HT:?157.00?cm? WT:?80.600?kg? BMI:?32.7?  Cardiovascular- RRR W/O MGR, Pulses equal throughout  ?   Respiratory- CTA, No wheezing, No crackles, No rhonchi  Assessment/Plan  1.?Hypertension?I10  1. Refill meds x 6 months  2. Continue monitor Na+ intake in diet  3. Exercise as tolerated, diet modifications (TLC)  4. Follow OV in 6 months or CPX, sooner if needed  Ordered:  quinapril, 20 mg = 1  tab(s), Oral, BID, # 180 tab(s), 1 Refill(s), Pharmacy: [facility]/pharmacy #[id number], 157, cm, Height/Length Dosing, 09/23/20 9:14:00 CDT, 80.6, kg, Weight Dosing, 09/23/20 9:14:00 CDT  Basic Metabolic Panel, Routine collect, 09/23/20 9:46:00 CDT, Blood, Order for future visit, Stop date 09/23/20 9:46:00 CDT, Lab Collect, Hypertension, 09/23/20 9:46:00 CDT  Clinic Follow up, *Est. 03/23/21 3:00:00 CDT, Order for future visit, Hypertension  Acid reflux  Hyperlipidemia  Anxiety, [facility]  Lab Collection Request, 09/23/20 9:46:00 CDT, [facility], 09/23/20 9:46:00 CDT, Hypertension  Office/Outpatient Visit Level 4 Established 28520 PC, Hypertension  Acid reflux  Hyperlipidemia  Anxiety, [facility], 09/23/20 9:46:00 CDT  Urinalysis no Reflex, Routine collect, Urine, Order for future visit, 09/23/20 9:46:00 CDT, Stop date 09/23/20 9:46:00 CDT, Nurse collect, Hypertension  ?  2.?Acid reflux?K21.9  1. Currently controlled with diet and meds, enc to continue to monitor diet  Ordered:  Clinic Follow up, *Est. 03/23/21 3:00:00 CDT, Order for future visit, Hypertension  Acid reflux  Hyperlipidemia  Anxiety, [facility]  Office/Outpatient Visit Level 4 Established 17591 PC, Hypertension  Acid reflux  Hyperlipidemia  Anxiety, [facility], 09/23/20 9:46:00 CDT  ?  3.?Hyperlipidemia?E78.5  1. Continue to monitor diet and exercise as tolerated (TLC)  2. Continue current meds- repeat FLP in 6 months  Ordered:  Clinic Follow up, *Est. 03/23/21 3:00:00 CDT, Order for future visit, Hypertension  Acid reflux  Hyperlipidemia  Anxiety, [facility]  Office/Outpatient Visit Level 4 Established 82622 PC, Hypertension  Acid reflux  Hyperlipidemia  Anxiety, [facility], 09/23/20 9:46:00 CDT  ?  4.?Anxiety?F41.9  1. Currently controlled with current meds  2. Refills x5 months, no s/h ideation  3. F/U for any changes/worsening in symptoms  Ordered:  Clinic Follow up, *Est. 03/23/21 3:00:00 CDT, Order for future visit,  Hypertension  Acid reflux  Hyperlipidemia  Anxiety, [facility]  Office/Outpatient Visit Level 4 Established 24709 PC, Hypertension  Acid reflux  Hyperlipidemia  Anxiety, [facility], 09/23/20 9:46:00 CDT  ?  Orders:  amLODIPine, 10 mg = 1 tab(s), Oral, Daily, # 90 tab(s), 1 Refill(s), [id number] 157, cm, Height/Length Dosing, 09/23/20 9:14:00 CDT, 80.6, kg, Weight Dosing, 09/23/20 9:14:00 CDT  escitalopram, 5 mg = 1 tab(s), Oral, Daily, # 30 tab(s), 5 Refill(s), Pharmacy: [facility]/pharmacy #[id number], 157, cm, Height/Length Dosing, 09/23/20 9:14:00 CDT, 80.6, kg, Weight Dosing, 09/23/20 9:14:00 CDT  Clinic Follow-up PRN, 09/23/20 9:46:00 CDT, [facility], Future Order  Referrals  Clinic Follow up, *Est. 03/23/21 3:00:00 CDT, Order for future visit, Hypertension  Acid reflux  Hyperlipidemia  Anxiety, [facility]  Clinic Follow-up PRN, 09/23/20 9:46:00 CDT, [facility], Future Order   Problem List/Past Medical History  Ongoing  Acid reflux  Hyperlipidemia  Hypertension  Obesity  Osteoarthritis  Osteoporosis  primary bilary cirrhosis  Primary osteoarthritis of left knee  Rheumatoid arthritis  Status post reverse arthroplasty of shoulder  Wellness examination  Historical  No qualifying data  Procedure/Surgical History  Replacement of Left Shoulder Joint with Reverse Ball and Socket Synthetic Substitute, Open Approach (04/03/2020)  Total Reverse Shoulder (Left) (04/03/2020)  Replacement of Right Shoulder Joint with Reverse Ball and Socket Synthetic Substitute, Open Approach (03/31/2020)  Total Reverse Shoulder (Right) (03/31/2020)  Repair Scalp Subcutaneous Tissue and Fascia, Percutaneous Approach (03/30/2020)  Fusion of 2 or more Cervical Vertebral Joints with Autologous Tissue Substitute, Posterior Approach, Posterior Column, Open Approach (11/26/2018)  Fusion of 2 to 7 Thoracic Vertebral Joints with Autologous Tissue Substitute, Posterior Approach, Posterior Column, Open Approach  (2018)  Fusion of Cervicothoracic Vertebral Joint with Autologous Tissue Substitute, Posterior Approach, Posterior Column, Open Approach (2018)  Posterior Cervical Fusion with O-arm (.) (2018)  Removal of Internal Fixation Device from Cervical Vertebra, Open Approach (2018)  Removal of Internal Fixation Device from Thoracic Vertebra, Open Approach (2018)  Procedure on spine (2018)  Replacement of Left Knee Joint with Synthetic Substitute, Cemented, Open Approach (2016)  Total Knee Arthroplasty (Left) (2016)  Bone density scan (2016)  Destruction of Left Lens, Percutaneous Approach (10/29/2015)  Discission of secondary membranous cataract (opacified posterior lens capsule and/or anterior hyaloid); laser surgery (eg, YAG laser) (1 or more stages) (10/29/2015)  Total knee replacement (2013)  Colonoscopy (2010)  Appendectomy;  arthoscopy - left knee    Cholecystectomy  cyst removed right hand  lumbar fusion  removal of catract bilatterally  right knee replacement  Tonsillectomy   Medications  acetaminophen-hydrocodone 325 mg-5 mg oral tablet, 1 tab(s), Oral, q6hr, PRN  Acidophilus Probiotic Blend, 10 billion cell, Oral, Daily  amLODIPine 10 mg oral tablet, 10 mg= 1 tab(s), Oral, Daily, 1 refills  ascorbic acid 500 mg oral tablet, 500 mg= 1 tab(s), Oral, At Bedtime  aspirin 81 mg oral tablet, 81 mg= 1 tab(s), Oral, Daily  Co-Q10, 1 tab, Oral, Daily  escitalopram 5 mg oral tablet, 5 mg= 1 tab(s), Oral, Daily, 5 refills  hydrochlorothiazide-triamterene 25 mg-37.5 mg oral tablet, 0.5 tab(s), Oral, Daily  ibandronate 150 mg oral tablet, See Instructions, 3 refills  methocarbamol 500 mg oral tablet, 500 mg= 1 tab(s), Oral, TID  Neurontin 300 mg oral capsule, 300 mg= 1 cap(s), Oral, At Bedtime  Oystercal-D, 1 tab(s), Oral, BID  polyethylene glycol 3350 oral powder for reconstitution, Oral, Daily  quinapril 20 mg oral tablet, 20 mg= 1 tab(s), Oral, BID, 1  refills  Senokot S 50 mg-8.6 mg oral tablet, 2 tab(s), Oral, BID  tiZANidine 4 mg oral tablet, 4 mg= 1 tab(s), Oral, q8hr  Ultram 50 mg oral tablet, 50 mg= 1 tab(s), Oral, q6hr, PRN  Louann 250 mg oral tablet, 1000 mg= 4 tab(s), Oral, BID  Welchol 625 mg oral tablet, 1875 mg= 3 tab(s), Oral, BID, 3 refills  Allergies  acetaminophen?(has primary cirrhosis)  Social History  Abuse/Neglect  No, No, Yes, 09/21/2020  Alcohol  Current, Liquor, Daily, 05/09/2016  Liquor, 07/30/2013  Substance Use - Denies Substance Abuse, 07/30/2013  Tobacco  Former smoker, quit more than 30 days ago, No, 09/21/2020  Family History  Diabetes mellitus type 2: Sister.  Hypertension: Negative: Mother and Father.  Primary malignant neoplasm of colon: Grandfather.  Immunizations  Vaccine Date Status   influenza virus vaccine, inactivated 11/12/2019 Given   influenza virus vaccine, inactivated 10/30/2018 Given   tetanus/diphtheria/pertussis, acel(Tdap) 10/30/2018 Given   influenza virus vaccine, inactivated 2017 Recorded   zoster vaccine live 12/29/2015 Recorded   pneumococcal 23-polyvalent vaccine 09/28/2015 Recorded   pneumococcal 13-valent conjugate vaccine 2014 Recorded   Health Maintenance  Health Maintenance  ???Pending?(in the next year)  ??? ??OverDue  ??? ? ? ?Influenza Vaccine due??and every?  ??? ? ? ?Pneumococcal Vaccine due??and every?  ??? ? ? ?Depression Screening due??07/12/17??and every 1??year(s)  ??? ? ? ?Advance Directive due??01/02/20??and every 1??year(s)  ??? ? ? ?Cognitive Screening due??01/02/20??and every 1??year(s)  ??? ? ? ?Hypertension Management-Education due??05/09/20??and every 1??year(s)  ??? ??Due?  ??? ? ? ?Zoster Vaccine due??09/23/20??and every?  ??? ??Due In Future?  ??? ? ? ?Obesity Screening not due until??01/01/21??and every 1??year(s)  ??? ? ? ?Fall Risk Assessment not due until??01/02/21??and every 1??year(s)  ??? ? ? ?Functional Assessment not due until??01/02/21??and every 1??year(s)  ??? ? ? ?Medicare  Annual Wellness Exam not due until??03/19/21??and every 1??year(s)  ??? ? ? ?Hypertension Management-BMP not due until??04/04/21??and every 1??year(s)  ??? ? ? ?Aspirin Therapy for CVD Prevention not due until??04/06/21??and every 1??year(s)  ??? ? ? ?ADL Screening not due until??04/15/21??and every 1??year(s)  ???Satisfied?(in the past 1 year)  ??? ??Satisfied?  ??? ? ? ?ADL Screening on??04/15/20.??Satisfied by Sophie Crawford  ??? ? ? ?Aspirin Therapy for CVD Prevention on??04/06/20.??Satisfied by Luly Wahl RN  ??? ? ? ?Blood Pressure Screening on??09/23/20.??Satisfied by Mariana Back NP  ??? ? ? ?Body Mass Index Check on??09/23/20.??Satisfied by Amanda Lopez MA  ??? ? ? ?Depression Screening on??09/23/20.??Satisfied by Mariana Back NP  ??? ? ? ?Diabetes Maintenance-Fasting Lipid Profile on??03/19/20.??Satisfied by Noemi Gonzáles  ??? ? ? ?Diabetes Screening on??04/04/20.??Satisfied by Valerie Rawls  ??? ? ? ?Fall Risk Assessment on??09/21/20.??Satisfied by Maddy Pimentel  ??? ? ? ?Functional Assessment on??04/06/20.??Satisfied by Luly Wahl RN  ??? ? ? ?Hypertension Management-Blood Pressure on??09/23/20.??Satisfied by Mariana Back NP  ??? ? ? ?Influenza Vaccine on??11/12/19.??Satisfied by Amanda Lopez MA  ??? ? ? ?Lipid Screening on??03/19/20.??Satisfied by Norman, Noemi A  ??? ? ? ?Medicare Annual Wellness Exam on??03/19/20.??Satisfied by Jos Briseno MD  ??? ? ? ?Obesity Screening on??09/23/20.??Satisfied by Amanda Lopez MA  ?      The?physician?is present within?the office with the?nurse practitioner.??The?office visit?documentation and management have been?reviewed and agreed upon.? I will continue to follow?this patient along with?the nurse practitioner?for current and future care.

## 2022-05-15 ENCOUNTER — HOSPITAL ENCOUNTER (EMERGENCY)
Facility: HOSPITAL | Age: 82
Discharge: HOME | End: 2022-05-15
Attending: STUDENT IN AN ORGANIZED HEALTH CARE EDUCATION/TRAINING PROGRAM
Payer: MEDICARE

## 2022-05-15 VITALS
DIASTOLIC BLOOD PRESSURE: 71 MMHG | SYSTOLIC BLOOD PRESSURE: 118 MMHG | HEART RATE: 72 BPM | OXYGEN SATURATION: 95 % | TEMPERATURE: 97 F | RESPIRATION RATE: 15 BRPM | WEIGHT: 175 LBS | BODY MASS INDEX: 32.2 KG/M2

## 2022-05-15 DIAGNOSIS — K42.9 UMBILICAL HERNIA WITHOUT OBSTRUCTION AND WITHOUT GANGRENE: Primary | ICD-10-CM

## 2022-05-15 LAB
ALBUMIN SERPL-MCNC: 3.8 GM/DL (ref 3.4–4.8)
ALBUMIN/GLOB SERPL: 1.1 RATIO (ref 1.1–2)
ALP SERPL-CCNC: 106 UNIT/L (ref 40–150)
ALT SERPL-CCNC: 30 UNIT/L (ref 0–55)
AST SERPL-CCNC: 31 UNIT/L (ref 5–34)
BASOPHILS # BLD AUTO: 0.06 X10(3)/MCL (ref 0–0.2)
BASOPHILS NFR BLD AUTO: 0.8 %
BILIRUBIN DIRECT+TOT PNL SERPL-MCNC: 0.8 MG/DL
BUN SERPL-MCNC: 14.8 MG/DL (ref 9.8–20.1)
CALCIUM SERPL-MCNC: 9.4 MG/DL (ref 8.4–10.2)
CHLORIDE SERPL-SCNC: 101 MMOL/L (ref 98–107)
CO2 SERPL-SCNC: 26 MMOL/L (ref 23–31)
CREAT SERPL-MCNC: 0.62 MG/DL (ref 0.55–1.02)
EOSINOPHIL # BLD AUTO: 0.36 X10(3)/MCL (ref 0–0.9)
EOSINOPHIL NFR BLD AUTO: 4.9 %
ERYTHROCYTE [DISTWIDTH] IN BLOOD BY AUTOMATED COUNT: 13.8 % (ref 11.5–17)
GLOBULIN SER-MCNC: 3.4 GM/DL (ref 2.4–3.5)
GLUCOSE SERPL-MCNC: 142 MG/DL (ref 82–115)
HCT VFR BLD AUTO: 40.1 % (ref 37–47)
HGB BLD-MCNC: 13.1 GM/DL (ref 12–16)
IMM GRANULOCYTES # BLD AUTO: 0.01 X10(3)/MCL (ref 0–0.02)
IMM GRANULOCYTES NFR BLD AUTO: 0.1 % (ref 0–0.43)
LACTATE SERPL-SCNC: 1.5 MMOL/L (ref 0.5–2.2)
LYMPHOCYTES # BLD AUTO: 3.73 X10(3)/MCL (ref 0.6–4.6)
LYMPHOCYTES NFR BLD AUTO: 51 %
MCH RBC QN AUTO: 31 PG (ref 27–31)
MCHC RBC AUTO-ENTMCNC: 32.7 MG/DL (ref 33–36)
MCV RBC AUTO: 94.8 FL (ref 80–94)
MONOCYTES # BLD AUTO: 0.71 X10(3)/MCL (ref 0.1–1.3)
MONOCYTES NFR BLD AUTO: 9.7 %
NEUTROPHILS # BLD AUTO: 2.4 X10(3)/MCL (ref 2.1–9.2)
NEUTROPHILS NFR BLD AUTO: 33.5 %
NRBC BLD AUTO-RTO: 0 %
PLATELET # BLD AUTO: 223 X10(3)/MCL (ref 130–400)
PMV BLD AUTO: 10.7 FL (ref 9.4–12.4)
POTASSIUM SERPL-SCNC: 4.1 MMOL/L (ref 3.5–5.1)
PROT SERPL-MCNC: 7.2 GM/DL (ref 5.8–7.6)
RBC # BLD AUTO: 4.23 X10(6)/MCL (ref 4.2–5.4)
SODIUM SERPL-SCNC: 137 MMOL/L (ref 136–145)
WBC # SPEC AUTO: 7.3 X10(3)/MCL (ref 4.5–11.5)

## 2022-05-15 PROCEDURE — 63600175 PHARM REV CODE 636 W HCPCS: Performed by: NURSE PRACTITIONER

## 2022-05-15 PROCEDURE — 83605 ASSAY OF LACTIC ACID: CPT | Performed by: STUDENT IN AN ORGANIZED HEALTH CARE EDUCATION/TRAINING PROGRAM

## 2022-05-15 PROCEDURE — 96374 THER/PROPH/DIAG INJ IV PUSH: CPT

## 2022-05-15 PROCEDURE — 63600175 PHARM REV CODE 636 W HCPCS: Performed by: STUDENT IN AN ORGANIZED HEALTH CARE EDUCATION/TRAINING PROGRAM

## 2022-05-15 PROCEDURE — 36415 COLL VENOUS BLD VENIPUNCTURE: CPT | Performed by: NURSE PRACTITIONER

## 2022-05-15 PROCEDURE — 99285 EMERGENCY DEPT VISIT HI MDM: CPT | Mod: 25

## 2022-05-15 PROCEDURE — 96361 HYDRATE IV INFUSION ADD-ON: CPT

## 2022-05-15 PROCEDURE — 80053 COMPREHEN METABOLIC PANEL: CPT | Performed by: NURSE PRACTITIONER

## 2022-05-15 PROCEDURE — 96376 TX/PRO/DX INJ SAME DRUG ADON: CPT

## 2022-05-15 PROCEDURE — 96375 TX/PRO/DX INJ NEW DRUG ADDON: CPT

## 2022-05-15 PROCEDURE — 25000003 PHARM REV CODE 250: Performed by: NURSE PRACTITIONER

## 2022-05-15 PROCEDURE — 85025 COMPLETE CBC W/AUTO DIFF WBC: CPT | Performed by: NURSE PRACTITIONER

## 2022-05-15 RX ORDER — FERROUS SULFATE, DRIED 160(50) MG
1 TABLET, EXTENDED RELEASE ORAL DAILY
COMMUNITY

## 2022-05-15 RX ORDER — EPINEPHRINE 0.22MG
100 AEROSOL WITH ADAPTER (ML) INHALATION
COMMUNITY

## 2022-05-15 RX ORDER — CHOLESTYRAMINE 4 G/9G
1 POWDER, FOR SUSPENSION ORAL 2 TIMES DAILY
COMMUNITY
Start: 2022-03-21 | End: 2022-06-15

## 2022-05-15 RX ORDER — HYDROMORPHONE HYDROCHLORIDE 2 MG/ML
1 INJECTION, SOLUTION INTRAMUSCULAR; INTRAVENOUS; SUBCUTANEOUS
Status: COMPLETED | OUTPATIENT
Start: 2022-05-15 | End: 2022-05-15

## 2022-05-15 RX ORDER — ASPIRIN 81 MG/1
81 TABLET ORAL
COMMUNITY

## 2022-05-15 RX ORDER — AMLODIPINE BESYLATE 10 MG/1
10 TABLET ORAL DAILY
COMMUNITY
Start: 2022-03-25 | End: 2022-09-27 | Stop reason: SDUPTHER

## 2022-05-15 RX ORDER — IBANDRONATE SODIUM 150 MG/1
150 TABLET, FILM COATED ORAL
COMMUNITY
End: 2022-11-22 | Stop reason: SDUPTHER

## 2022-05-15 RX ORDER — ONDANSETRON 2 MG/ML
4 INJECTION INTRAMUSCULAR; INTRAVENOUS
Status: COMPLETED | OUTPATIENT
Start: 2022-05-15 | End: 2022-05-15

## 2022-05-15 RX ORDER — URSODIOL 250 MG/1
TABLET, FILM COATED ORAL
COMMUNITY
Start: 2022-03-30

## 2022-05-15 RX ORDER — TRIAMTERENE/HYDROCHLOROTHIAZID 37.5-25 MG
0.5 TABLET ORAL
COMMUNITY
End: 2022-09-26

## 2022-05-15 RX ORDER — ESCITALOPRAM OXALATE 5 MG/1
5 TABLET ORAL DAILY
COMMUNITY
Start: 2022-03-30 | End: 2023-03-06

## 2022-05-15 RX ORDER — QUINAPRIL 20 MG/1
20 TABLET ORAL 2 TIMES DAILY
COMMUNITY
Start: 2022-03-25 | End: 2022-09-26

## 2022-05-15 RX ADMIN — HYDROMORPHONE HYDROCHLORIDE 1 MG: 2 INJECTION, SOLUTION INTRAMUSCULAR; INTRAVENOUS; SUBCUTANEOUS at 01:05

## 2022-05-15 RX ADMIN — HYDROMORPHONE HYDROCHLORIDE 1 MG: 2 INJECTION, SOLUTION INTRAMUSCULAR; INTRAVENOUS; SUBCUTANEOUS at 09:05

## 2022-05-15 RX ADMIN — SODIUM CHLORIDE 500 ML: 9 INJECTION, SOLUTION INTRAVENOUS at 09:05

## 2022-05-15 RX ADMIN — ONDANSETRON 4 MG: 2 INJECTION INTRAMUSCULAR; INTRAVENOUS at 09:05

## 2022-05-15 NOTE — ED PROVIDER NOTES
"Encounter Date: 5/15/2022    SCRIBE #1 NOTE: I, Romi Leos, am scribing for, and in the presence of,  Chalino Silva MD. I have scribed the following portions of the note - Other sections scribed: HPI, ROS, PE.       History     Chief Complaint   Patient presents with    Hernia     Pt states she sneezed this and "hernia popped out", pain to abdomen/hernia. +nausea     81 year old female with history of HTN, HDL presents to ED complaining of abdominal pain.  Pt reports sneezing this morning and her hernia, "popped out," causing severe pain.  She says she has had the hernia for about 2 years, but that it has not caused her pain.    She also reports nausea. Pt reports having a normal BM yesterday.    The history is provided by the patient. No  was used.   Abdominal Pain  The current episode started just prior to arrival. The onset of the illness was abrupt. The abdominal pain is located in the periumbilical region. The abdominal pain does not radiate. The abdominal pain is relieved by nothing. The abdominal pain is exacerbated by vomiting. The other symptoms of the illness include nausea. The other symptoms of the illness do not include fever, fatigue, shortness of breath, vomiting, diarrhea or dysuria.   The patient states that she believes she is currently not pregnant. The patient has not had a change in bowel habit. Symptoms associated with the illness do not include chills, constipation, urgency, hematuria or frequency.     Review of patient's allergies indicates:  No Known Allergies  No past medical history on file.  No past surgical history on file.  No family history on file.     Review of Systems   Constitutional: Negative for chills, fatigue and fever.   HENT: Negative for congestion, ear discharge, ear pain, nosebleeds, rhinorrhea and sore throat.    Respiratory: Negative for cough, chest tightness, shortness of breath and wheezing.    Cardiovascular: Negative for chest pain and " leg swelling.   Gastrointestinal: Positive for abdominal pain and nausea. Negative for blood in stool, constipation, diarrhea and vomiting.   Genitourinary: Negative for dysuria, frequency, hematuria and urgency.   Musculoskeletal: Negative for arthralgias, myalgias and neck pain.   Neurological: Negative for dizziness, seizures, weakness, numbness and headaches.       Physical Exam     Initial Vitals [05/15/22 0932]   BP Pulse Resp Temp SpO2   (!) 140/76 78 18 97.3 °F (36.3 °C) 96 %      MAP       --         Physical Exam    Nursing note and vitals reviewed.  Constitutional: She appears well-developed. She is not diaphoretic. No distress.   HENT:   Head: Normocephalic and atraumatic.   Right Ear: External ear normal.   Left Ear: External ear normal.   Nose: Nose normal.   Mouth/Throat: Oropharynx is clear and moist.   Eyes: Conjunctivae and EOM are normal. Pupils are equal, round, and reactive to light. Right eye exhibits no discharge. Left eye exhibits no discharge. No scleral icterus.   Neck: Neck supple. No tracheal deviation present.   Normal range of motion.  Cardiovascular: Normal rate, regular rhythm and intact distal pulses. Exam reveals no gallop and no friction rub.    No murmur heard.  Pulmonary/Chest: Breath sounds normal. No stridor. No respiratory distress. She has no wheezes. She has no rhonchi. She has no rales. She exhibits no tenderness.   Abdominal: Abdomen is soft. She exhibits no distension. Bowel sounds are decreased. There is no abdominal tenderness. A hernia is present. Hernia confirmed positive in the umbilical area.   Soft mass; umbilical hernia, no overlying skin changes, tender to palpation There is no guarding.   Musculoskeletal:         General: No tenderness or edema.      Cervical back: Normal range of motion and neck supple.     Neurological: She is alert and oriented to person, place, and time. No cranial nerve deficit or sensory deficit.   Skin: Skin is warm and dry. Capillary  refill takes less than 2 seconds. No rash noted. No erythema. No pallor.         ED Course   Procedures  Labs Reviewed   CBC WITH DIFFERENTIAL - Abnormal; Notable for the following components:       Result Value    MCV 94.8 (*)     MCHC 32.7 (*)     All other components within normal limits   COMPREHENSIVE METABOLIC PANEL - Abnormal; Notable for the following components:    Glucose Level 142 (*)     All other components within normal limits   LACTIC ACID, PLASMA - Normal          Imaging Results          CT Abdomen Pelvis  Without Contrast (Final result)  Result time 05/15/22 12:12:29    Final result by Aman Thomas MD (05/15/22 12:12:29)                 Impression:      60 mm umbilical hernia containing short segment of small bowel.  No definitive findings for bowel obstruction or bowel ischemia.      Electronically signed by: Aman Thomas  Date:    05/15/2022  Time:    12:12             Narrative:    EXAMINATION:  CT ABDOMEN PELVIS WITHOUT CONTRAST    CLINICAL HISTORY:  umbilical hernia, incarcerated? strangulated?;    TECHNIQUE:  CT imaging of the abdomen and pelvis without intravenous contrast. Axial, coronal and sagittal reformatted images reviewed. Dose length product is 348 mGycm. Automatic exposure control, adjustment of mA/kV or iterative reconstruction technique used to limit radiation dose.    COMPARISON:  No relevant comparison studies available at the time of dictation.    FINDINGS:  Assessment of the visceral organs and vasculature is limited by the lack of IV contrast.    Liver/biliary: No concerning hepatic findings. Prior cholecystectomy.    Pancreas: Normal.    Spleen: Normal.    Adrenals: Normal.    Genitourinary: 2 adjacent cysts within the lower left kidney, measuring up to 30 mm.  These need no dedicated imaging follow-up.  No hydronephrosis.  Bladder under distended with no definitive abnormality.    Stomach/bowel: 60 mm umbilical hernia containing a short segment of small bowel.  Hernia  neck measures about 25 mm.  No upstream bowel dilatation.  No pneumatosis.  Appendix not confidently seen.    Lymph nodes: No pathologically enlarged lymph node identified with noncontrast technique.    Peritoneum: No ascites or free air.    Vasculature: Numerous calcifications in the abdominal aorta and iliac arteries.  No abdominal aortic aneurysm.  No portal venous gas.    Lung bases: Areas of minimal atelectasis.  No pleural effusion.    Bones: Cement in the L5 vertebral body.  Chronic appearing T10 compression deformity.                                 Medications   HYDROmorphone (PF) injection 1 mg (1 mg Intravenous Given 5/15/22 0955)   ondansetron injection 4 mg (4 mg Intravenous Given 5/15/22 0955)   sodium chloride 0.9% bolus 500 mL (0 mLs Intravenous Stopped 5/15/22 1058)   HYDROmorphone (PF) injection 1 mg (1 mg Intravenous Given 5/15/22 1300)     Medical Decision Making:   Differential Diagnosis:   Umbilical hernia, incarcerated hernia, strangulated hernia  Clinical Tests:   Lab Tests: Reviewed and Ordered  Radiological Study: Reviewed and Ordered  ED Management:  Patient's labs are unremarkable, she does not have any lactic acidosis.  Her vitals are stable.  She remains relatively pain-free after Dilaudid initially here in the emergency department.  She is given additional 1 of Dilaudid and attempts were made to reduce the hernia after having an ice pack over it.  Whenever the ice pack is removed the hernia is largely reduced already.  Minimal pressure is used to reduce the rest of it.  The hernia remains soft, there is no skin changes.  She is well appearing no longer any pain.  She is amenable to discharge home and I believe it is appropriate.  I discussed findings with both patient and her family in the room.  I encouraged her to follow up with the primary care physician and possibly see get surgical consultation as this may require fixation in the future.  Patient and family voiced understanding.   Course invited, return precautions given for worsening pain fever chills a hard and unreducible hernia, nausea, vomiting, fever.  Patient and family voiced understanding.  Questions invited, questions of best my ability.  Patient discharged home which is stable.            Attending Attestation:           Physician Attestation for Scribe:  Physician Attestation Statement for Scribe #1: I, Chalino Silva MD, reviewed documentation, as scribed by Romi Leos in my presence, and it is both accurate and complete.             ED Course as of 05/15/22 1729   Sun May 15, 2022   1322 On re-evaluation patient resting comfortably.  She states that she believes are hernia has gotten smaller since ice was applied.  Using gentle pressure it is further reduced.  She is asymptomatic at this time.  Labs are largely unremarkable.  I discussed with her return precautions for returning hernia if it is irreducible red, swollen, she has fever chills nausea vomiting.  Otherwise recommended following up with the PCP may require referral to surgeon for permanent, surgical fix.  Patient and  her voice understanding.  Patient will be discharged home at this time. [MM]      ED Course User Index  [MM] Chalino Silva MD             Clinical Impression:   Final diagnoses:  [K42.9] Umbilical hernia without obstruction and without gangrene (Primary)          ED Disposition Condition    Discharge Stable        ED Prescriptions     None        Follow-up Information     Follow up With Specialties Details Why Contact Info    Jos Briseno MD Family Medicine Schedule an appointment as soon as possible for a visit in 1 week  [address] [place] [id number]  [phone]             Chalino Silva MD  05/15/22 1729

## 2022-05-15 NOTE — ED NOTES
Assumed care of pt at this time. Pt resting in stretcher w/equal unlabored respirations.GCS 15, AOX4. No apparent distress. No new complaints at this time. Hernia reduced by Dr. Silva at bedside. Updated pt on plan of care. Side rails raised, call bell within reach, bed locked and in lowest position.  at bedside. Will continue to monitor.

## 2022-05-15 NOTE — DISCHARGE INSTRUCTIONS
Return to the emergency department for worsening symptoms.  Weakness, numbness, tingling.  Nausea, vomiting, inability to eat, drink, take your medication.    Return emergency department if your hernia returns and cannot push it back in or reduce it.  If it becomes red, swollen, tender to palpation.

## 2022-05-15 NOTE — FIRST PROVIDER EVALUATION
Medical screening exam completed.  I have conducted a focused provider triage encounter, findings are as follows:    Brief history of present illness:  80 y/o female who presents with sneezing this morning and her hernia popped out and is extremely painful and hard and unable to push it back in. +nausea.     There were no vitals filed for this visit.    Pertinent physical exam:  Alert, abdomen near umbilicus hard with protrusion noted, cooperative, nonlabored respirations    Brief workup plan:  Labs, meds, possible imaging.    Preliminary workup initiated; this workup will be continued and followed by the physician or advanced practice provider that is assigned to the patient when roomed.

## 2022-05-15 NOTE — ED NOTES
Discharge instructions, follow up information, and teaching provided to pt. Pt verbalizes understanding. All questions answered. Pt is GCS 15, equal unlabored respirations. Pt assisted to exit in wheelchair by primary RN accompanied by pt .

## 2022-06-14 RX ORDER — ACETAMINOPHEN 500 MG
1000 TABLET ORAL
Status: CANCELLED | OUTPATIENT
Start: 2022-06-15 | End: 2022-06-15

## 2022-06-15 ENCOUNTER — HOSPITAL ENCOUNTER (OUTPATIENT)
Facility: HOSPITAL | Age: 82
Discharge: HOME | End: 2022-06-16
Attending: SURGERY | Admitting: SURGERY
Payer: MEDICARE

## 2022-06-15 ENCOUNTER — ANESTHESIA (OUTPATIENT)
Dept: SURGERY | Facility: HOSPITAL | Age: 82
End: 2022-06-15
Payer: MEDICARE

## 2022-06-15 ENCOUNTER — ANESTHESIA EVENT (OUTPATIENT)
Dept: SURGERY | Facility: HOSPITAL | Age: 82
End: 2022-06-15
Payer: MEDICARE

## 2022-06-15 DIAGNOSIS — Z01.818 PREOP TESTING: ICD-10-CM

## 2022-06-15 DIAGNOSIS — K43.2 INCISIONAL HERNIA, WITHOUT OBSTRUCTION OR GANGRENE: ICD-10-CM

## 2022-06-15 DIAGNOSIS — K43.9 VENTRAL HERNIA: ICD-10-CM

## 2022-06-15 PROBLEM — K43.0 INCARCERATED INCISIONAL HERNIA: Status: ACTIVE | Noted: 2022-06-15

## 2022-06-15 PROBLEM — K43.0 INCARCERATED INCISIONAL HERNIA: Status: RESOLVED | Noted: 2022-06-15 | Resolved: 2022-06-15

## 2022-06-15 PROCEDURE — 63600175 PHARM REV CODE 636 W HCPCS

## 2022-06-15 PROCEDURE — 25000003 PHARM REV CODE 250: Performed by: NURSE PRACTITIONER

## 2022-06-15 PROCEDURE — 63600175 PHARM REV CODE 636 W HCPCS: Performed by: NURSE PRACTITIONER

## 2022-06-15 PROCEDURE — 71000033 HC RECOVERY, INTIAL HOUR: Performed by: SURGERY

## 2022-06-15 PROCEDURE — 71000016 HC POSTOP RECOV ADDL HR: Performed by: SURGERY

## 2022-06-15 PROCEDURE — 36000708 HC OR TIME LEV III 1ST 15 MIN: Performed by: SURGERY

## 2022-06-15 PROCEDURE — 71000039 HC RECOVERY, EACH ADD'L HOUR: Performed by: SURGERY

## 2022-06-15 PROCEDURE — 27000221 HC OXYGEN, UP TO 24 HOURS: Performed by: SURGERY

## 2022-06-15 PROCEDURE — 25000003 PHARM REV CODE 250: Performed by: SURGERY

## 2022-06-15 PROCEDURE — 63600175 PHARM REV CODE 636 W HCPCS: Performed by: NURSE ANESTHETIST, CERTIFIED REGISTERED

## 2022-06-15 PROCEDURE — 27201423 OPTIME MED/SURG SUP & DEVICES STERILE SUPPLY: Performed by: SURGERY

## 2022-06-15 PROCEDURE — 37000008 HC ANESTHESIA 1ST 15 MINUTES: Performed by: SURGERY

## 2022-06-15 PROCEDURE — 25000003 PHARM REV CODE 250: Performed by: NURSE ANESTHETIST, CERTIFIED REGISTERED

## 2022-06-15 PROCEDURE — 36000709 HC OR TIME LEV III EA ADD 15 MIN: Performed by: SURGERY

## 2022-06-15 PROCEDURE — 37000009 HC ANESTHESIA EA ADD 15 MINS: Performed by: SURGERY

## 2022-06-15 PROCEDURE — C1781 MESH (IMPLANTABLE): HCPCS | Performed by: SURGERY

## 2022-06-15 PROCEDURE — 63600175 PHARM REV CODE 636 W HCPCS: Performed by: ANESTHESIOLOGY

## 2022-06-15 PROCEDURE — 71000015 HC POSTOP RECOV 1ST HR: Performed by: SURGERY

## 2022-06-15 DEVICE — MESH PARIETEX ROUND 9CM: Type: IMPLANTABLE DEVICE | Site: ABDOMEN | Status: FUNCTIONAL

## 2022-06-15 RX ORDER — ONDANSETRON 4 MG/1
4 TABLET, ORALLY DISINTEGRATING ORAL
Status: COMPLETED | OUTPATIENT
Start: 2022-06-15 | End: 2022-06-15

## 2022-06-15 RX ORDER — MIDAZOLAM HYDROCHLORIDE 1 MG/ML
INJECTION INTRAMUSCULAR; INTRAVENOUS
Status: COMPLETED
Start: 2022-06-15 | End: 2022-06-15

## 2022-06-15 RX ORDER — PROCHLORPERAZINE EDISYLATE 5 MG/ML
5 INJECTION INTRAMUSCULAR; INTRAVENOUS EVERY 6 HOURS PRN
Status: DISCONTINUED | OUTPATIENT
Start: 2022-06-15 | End: 2022-06-16 | Stop reason: HOSPADM

## 2022-06-15 RX ORDER — MEPERIDINE HYDROCHLORIDE 50 MG/ML
12.5 INJECTION INTRAMUSCULAR; INTRAVENOUS; SUBCUTANEOUS
Status: CANCELLED | OUTPATIENT
Start: 2022-06-15 | End: 2022-06-15

## 2022-06-15 RX ORDER — ENOXAPARIN SODIUM 100 MG/ML
INJECTION SUBCUTANEOUS
Status: DISPENSED
Start: 2022-06-15 | End: 2022-06-15

## 2022-06-15 RX ORDER — SODIUM CHLORIDE, SODIUM LACTATE, POTASSIUM CHLORIDE, CALCIUM CHLORIDE 600; 310; 30; 20 MG/100ML; MG/100ML; MG/100ML; MG/100ML
INJECTION, SOLUTION INTRAVENOUS CONTINUOUS
Status: DISCONTINUED | OUTPATIENT
Start: 2022-06-15 | End: 2022-06-16 | Stop reason: HOSPADM

## 2022-06-15 RX ORDER — BUPIVACAINE HYDROCHLORIDE AND EPINEPHRINE 2.5; 5 MG/ML; UG/ML
INJECTION, SOLUTION EPIDURAL; INFILTRATION; INTRACAUDAL; PERINEURAL
Status: DISCONTINUED | OUTPATIENT
Start: 2022-06-15 | End: 2022-06-15 | Stop reason: HOSPADM

## 2022-06-15 RX ORDER — GABAPENTIN 300 MG/1
300 CAPSULE ORAL
Status: COMPLETED | OUTPATIENT
Start: 2022-06-15 | End: 2022-06-15

## 2022-06-15 RX ORDER — ONDANSETRON 4 MG/1
TABLET, ORALLY DISINTEGRATING ORAL
Status: DISPENSED
Start: 2022-06-15 | End: 2022-06-15

## 2022-06-15 RX ORDER — DIPHENHYDRAMINE HYDROCHLORIDE 50 MG/ML
25 INJECTION INTRAMUSCULAR; INTRAVENOUS EVERY 6 HOURS PRN
Status: DISCONTINUED | OUTPATIENT
Start: 2022-06-15 | End: 2022-06-15

## 2022-06-15 RX ORDER — CEFAZOLIN SODIUM 1 G/3ML
INJECTION, POWDER, FOR SOLUTION INTRAMUSCULAR; INTRAVENOUS
Status: DISPENSED
Start: 2022-06-15 | End: 2022-06-15

## 2022-06-15 RX ORDER — CEFAZOLIN SODIUM 2 G/50ML
2 SOLUTION INTRAVENOUS
Status: DISCONTINUED | OUTPATIENT
Start: 2022-06-15 | End: 2022-06-15

## 2022-06-15 RX ORDER — ENOXAPARIN SODIUM 100 MG/ML
40 INJECTION SUBCUTANEOUS
Status: COMPLETED | OUTPATIENT
Start: 2022-06-15 | End: 2022-06-15

## 2022-06-15 RX ORDER — ONDANSETRON 2 MG/ML
4 INJECTION INTRAMUSCULAR; INTRAVENOUS DAILY PRN
Status: DISCONTINUED | OUTPATIENT
Start: 2022-06-15 | End: 2022-06-15

## 2022-06-15 RX ORDER — SODIUM CHLORIDE, SODIUM GLUCONATE, SODIUM ACETATE, POTASSIUM CHLORIDE AND MAGNESIUM CHLORIDE 30; 37; 368; 526; 502 MG/100ML; MG/100ML; MG/100ML; MG/100ML; MG/100ML
1000 INJECTION, SOLUTION INTRAVENOUS CONTINUOUS
Status: DISCONTINUED | OUTPATIENT
Start: 2022-06-15 | End: 2022-06-15

## 2022-06-15 RX ORDER — METHOCARBAMOL 100 MG/ML
750 INJECTION, SOLUTION INTRAMUSCULAR; INTRAVENOUS ONCE
Status: COMPLETED | OUTPATIENT
Start: 2022-06-15 | End: 2022-06-15

## 2022-06-15 RX ORDER — HYDROCODONE BITARTRATE AND ACETAMINOPHEN 5; 325 MG/1; MG/1
1 TABLET ORAL EVERY 6 HOURS PRN
Status: DISCONTINUED | OUTPATIENT
Start: 2022-06-15 | End: 2022-06-16 | Stop reason: HOSPADM

## 2022-06-15 RX ORDER — ROCURONIUM BROMIDE 10 MG/ML
INJECTION, SOLUTION INTRAVENOUS
Status: DISCONTINUED | OUTPATIENT
Start: 2022-06-15 | End: 2022-06-15

## 2022-06-15 RX ORDER — HYDROCODONE BITARTRATE AND ACETAMINOPHEN 5; 325 MG/1; MG/1
1 TABLET ORAL EVERY 6 HOURS PRN
Qty: 10 TABLET | Refills: 0 | Status: SHIPPED | OUTPATIENT
Start: 2022-06-15 | End: 2022-08-22

## 2022-06-15 RX ORDER — LIDOCAINE HYDROCHLORIDE 10 MG/ML
1 INJECTION, SOLUTION EPIDURAL; INFILTRATION; INTRACAUDAL; PERINEURAL ONCE
Status: DISCONTINUED | OUTPATIENT
Start: 2022-06-15 | End: 2022-06-15

## 2022-06-15 RX ORDER — HYDROMORPHONE HYDROCHLORIDE 2 MG/ML
0.2 INJECTION, SOLUTION INTRAMUSCULAR; INTRAVENOUS; SUBCUTANEOUS EVERY 5 MIN PRN
Status: COMPLETED | OUTPATIENT
Start: 2022-06-15 | End: 2022-06-15

## 2022-06-15 RX ORDER — ONDANSETRON 4 MG/1
4 TABLET, ORALLY DISINTEGRATING ORAL ONCE
Status: DISCONTINUED | OUTPATIENT
Start: 2022-06-15 | End: 2022-06-15

## 2022-06-15 RX ORDER — LIDOCAINE HYDROCHLORIDE 10 MG/ML
INJECTION, SOLUTION EPIDURAL; INFILTRATION; INTRACAUDAL; PERINEURAL
Status: DISCONTINUED | OUTPATIENT
Start: 2022-06-15 | End: 2022-06-15

## 2022-06-15 RX ORDER — FENTANYL CITRATE 50 UG/ML
INJECTION, SOLUTION INTRAMUSCULAR; INTRAVENOUS
Status: DISCONTINUED | OUTPATIENT
Start: 2022-06-15 | End: 2022-06-15

## 2022-06-15 RX ORDER — PROPOFOL 10 MG/ML
VIAL (ML) INTRAVENOUS
Status: DISCONTINUED | OUTPATIENT
Start: 2022-06-15 | End: 2022-06-15

## 2022-06-15 RX ORDER — SODIUM CHLORIDE 0.9 % (FLUSH) 0.9 %
3 SYRINGE (ML) INJECTION
Status: CANCELLED | OUTPATIENT
Start: 2022-06-15

## 2022-06-15 RX ORDER — SODIUM CHLORIDE, SODIUM LACTATE, POTASSIUM CHLORIDE, CALCIUM CHLORIDE 600; 310; 30; 20 MG/100ML; MG/100ML; MG/100ML; MG/100ML
INJECTION, SOLUTION INTRAVENOUS CONTINUOUS
Status: DISCONTINUED | OUTPATIENT
Start: 2022-06-15 | End: 2022-06-15

## 2022-06-15 RX ORDER — MIDAZOLAM HYDROCHLORIDE 1 MG/ML
2 INJECTION INTRAMUSCULAR; INTRAVENOUS ONCE AS NEEDED
Status: COMPLETED | OUTPATIENT
Start: 2022-06-15 | End: 2022-06-15

## 2022-06-15 RX ORDER — LORAZEPAM 2 MG/ML
0.25 INJECTION INTRAMUSCULAR ONCE AS NEEDED
Status: DISCONTINUED | OUTPATIENT
Start: 2022-06-15 | End: 2022-06-15

## 2022-06-15 RX ORDER — ONDANSETRON 2 MG/ML
4 INJECTION INTRAMUSCULAR; INTRAVENOUS EVERY 6 HOURS PRN
Status: DISCONTINUED | OUTPATIENT
Start: 2022-06-15 | End: 2022-06-16 | Stop reason: HOSPADM

## 2022-06-15 RX ORDER — GABAPENTIN 300 MG/1
CAPSULE ORAL
Status: DISPENSED
Start: 2022-06-15 | End: 2022-06-15

## 2022-06-15 RX ORDER — TRAMADOL HYDROCHLORIDE 50 MG/1
50 TABLET ORAL EVERY 4 HOURS PRN
Status: DISCONTINUED | OUTPATIENT
Start: 2022-06-15 | End: 2022-06-16 | Stop reason: HOSPADM

## 2022-06-15 RX ORDER — MORPHINE SULFATE 4 MG/ML
1 INJECTION, SOLUTION INTRAMUSCULAR; INTRAVENOUS
Status: DISCONTINUED | OUTPATIENT
Start: 2022-06-15 | End: 2022-06-16 | Stop reason: HOSPADM

## 2022-06-15 RX ORDER — METOCLOPRAMIDE HYDROCHLORIDE 5 MG/ML
10 INJECTION INTRAMUSCULAR; INTRAVENOUS EVERY 10 MIN PRN
Status: DISCONTINUED | OUTPATIENT
Start: 2022-06-15 | End: 2022-06-15

## 2022-06-15 RX ADMIN — HYDROMORPHONE HYDROCHLORIDE 0.2 MG: 2 INJECTION, SOLUTION INTRAMUSCULAR; INTRAVENOUS; SUBCUTANEOUS at 11:06

## 2022-06-15 RX ADMIN — ROCURONIUM BROMIDE 40 MG: 10 SOLUTION INTRAVENOUS at 09:06

## 2022-06-15 RX ADMIN — SODIUM CHLORIDE, POTASSIUM CHLORIDE, SODIUM LACTATE AND CALCIUM CHLORIDE: 600; 310; 30; 20 INJECTION, SOLUTION INTRAVENOUS at 09:06

## 2022-06-15 RX ADMIN — SUGAMMADEX 100 MG: 100 INJECTION, SOLUTION INTRAVENOUS at 10:06

## 2022-06-15 RX ADMIN — HYDROCODONE BITARTRATE AND ACETAMINOPHEN 1 TABLET: 5; 325 TABLET ORAL at 05:06

## 2022-06-15 RX ADMIN — MIDAZOLAM HYDROCHLORIDE 1 MG: 1 INJECTION, SOLUTION INTRAMUSCULAR; INTRAVENOUS at 08:06

## 2022-06-15 RX ADMIN — PROPOFOL 120 MG: 10 INJECTION, EMULSION INTRAVENOUS at 09:06

## 2022-06-15 RX ADMIN — LIDOCAINE HYDROCHLORIDE 40 MG: 10 INJECTION, SOLUTION EPIDURAL; INFILTRATION; INTRACAUDAL; PERINEURAL at 09:06

## 2022-06-15 RX ADMIN — METHOCARBAMOL 750 MG: 100 INJECTION INTRAMUSCULAR; INTRAVENOUS at 10:06

## 2022-06-15 RX ADMIN — TRAMADOL HYDROCHLORIDE 50 MG: 50 TABLET, COATED ORAL at 09:06

## 2022-06-15 RX ADMIN — SODIUM CHLORIDE, POTASSIUM CHLORIDE, SODIUM LACTATE AND CALCIUM CHLORIDE: 600; 310; 30; 20 INJECTION, SOLUTION INTRAVENOUS at 12:06

## 2022-06-15 RX ADMIN — HYDROMORPHONE HYDROCHLORIDE 0.2 MG: 2 INJECTION, SOLUTION INTRAMUSCULAR; INTRAVENOUS; SUBCUTANEOUS at 10:06

## 2022-06-15 RX ADMIN — MIDAZOLAM HYDROCHLORIDE 1 MG: 1 INJECTION INTRAMUSCULAR; INTRAVENOUS at 08:06

## 2022-06-15 RX ADMIN — ONDANSETRON 4 MG: 4 TABLET, ORALLY DISINTEGRATING ORAL at 07:06

## 2022-06-15 RX ADMIN — CEFAZOLIN SODIUM 2 G: 2 SOLUTION INTRAVENOUS at 09:06

## 2022-06-15 RX ADMIN — TRAMADOL HYDROCHLORIDE 50 MG: 50 TABLET, COATED ORAL at 03:06

## 2022-06-15 RX ADMIN — FENTANYL CITRATE 50 MCG: 50 INJECTION, SOLUTION INTRAMUSCULAR; INTRAVENOUS at 09:06

## 2022-06-15 RX ADMIN — GABAPENTIN 300 MG: 300 CAPSULE ORAL at 07:06

## 2022-06-15 RX ADMIN — SODIUM CHLORIDE, POTASSIUM CHLORIDE, SODIUM LACTATE AND CALCIUM CHLORIDE: 600; 310; 30; 20 INJECTION, SOLUTION INTRAVENOUS at 08:06

## 2022-06-15 RX ADMIN — ROCURONIUM BROMIDE 10 MG: 10 SOLUTION INTRAVENOUS at 09:06

## 2022-06-15 RX ADMIN — ENOXAPARIN SODIUM 40 MG: 40 INJECTION SUBCUTANEOUS at 07:06

## 2022-06-15 NOTE — ANESTHESIA PROCEDURE NOTES
Intubation    Date/Time: 6/15/2022 9:11 AM  Performed by: Maury Pascal CRNA  Authorized by: Berny Back MD     Intubation:     Induction:  Inhalational - ETT/trach    Intubated:  Postinduction    Mask Ventilation:  Easy mask    Attempts:  1    Attempted By:  CRNA    Method of Intubation:  Direct    Blade:  Glidescope 3 (Elected glidescope intubation due to limited neck mobility related to neck surgery.)    Laryngeal View Grade: Grade I - full view of cords      Difficult Airway Encountered?: No      Complications:  None    Airway Device:  Oral endotracheal tube    Airway Device Size:  7.0    Style/Cuff Inflation:  Cuffed (inflated to minimal occlusive pressure)    Tube secured:  22    Secured at:  The lips    Placement Verified By:  Capnometry    Complicating Factors:  None    Findings Post-Intubation:  BS equal bilateral and atraumatic/condition of teeth unchanged

## 2022-06-15 NOTE — PROGRESS NOTES
Op site noted with all dressings listed in the op report, area soft to the touch,  binder noted over dressing

## 2022-06-15 NOTE — BRIEF OP NOTE
[facility] Surgical - Periop Services  Brief Operative Note    Surgery Date: 6/15/2022     Surgeon(s) and Role:     * Nayan Gonzalez MD - Primary    Assisting Surgeon: Leo LOVE    Pre-op Diagnosis:  Incisional hernia, without obstruction or gangrene [K43.2]    Post-op Diagnosis:  Post-Op Diagnosis Codes: Incarcerated incisional hernia    Procedure(s) (LRB):  REPAIR, HERNIA, INCISIONAL, LAPAROSCOPIC (N/A)    Anesthesia: General    Operative Findings: dictated per attending MD    Estimated Blood Loss: * No values recorded between 6/15/2022  9:33 AM and 6/15/2022 10:38 AM *         Specimens: Contents of hernia sac  Specimen (24h ago, onward)             Start     Ordered    06/15/22 1026  Specimen to Pathology  RELEASE UPON ORDERING        References:    Click here for ordering Quick Tip   Question:  Release to patient  Answer:  Immediate    06/15/22 8407

## 2022-06-15 NOTE — ANESTHESIA POSTPROCEDURE EVALUATION
Anesthesia Post Evaluation    Patient: Purnima Higgins    Procedure(s) Performed: Procedure(s) (LRB):  REPAIR, HERNIA, INCISIONAL, LAPAROSCOPIC (N/A)    Final Anesthesia Type: general      Patient location during evaluation: PACU  Patient participation: Yes- Able to Participate  Level of consciousness: awake and alert and oriented  Post-procedure vital signs: reviewed and stable  Pain management: adequate  Airway patency: patent  MASHA mitigation strategies: Verification of full reversal of neuromuscular block  PONV status at discharge: No PONV  Anesthetic complications: no      Cardiovascular status: blood pressure returned to baseline and stable  Respiratory status: spontaneous ventilation and unassisted  Hydration status: euvolemic  Follow-up not needed.  Comments: [facility]          Vitals Value Taken Time   /64 06/15/22 1220   Temp 36.3 °C (97.4 °F) 06/15/22 1220   Pulse 74 06/15/22 1220   Resp 15 06/15/22 1220   SpO2 94 % 06/15/22 1220         Event Time   Out of Recovery 12:09:00         Pain/Dora Score: Pain Rating Prior to Med Admin: 8 (6/15/2022 11:40 AM)  Dora Score: 8 (6/15/2022 11:56 AM)

## 2022-06-15 NOTE — H&P
[facility] Surgical - Periop Services  General Surgery  History & Physical    Patient Name: Purnima Higgins  MRN: [id number]  Admission Date: 6/15/2022  Attending Physician: Nayan Gonzalez MD   Primary Care Provider: Jos Briseno MD    Patient information was obtained from patient and ER records.     Subjective:     Chief Complaint/Reason for Admission: Lap Ventral Hernia Repair    History of Present Illness:       No current facility-administered medications on file prior to encounter.     Current Outpatient Medications on File Prior to Encounter   Medication Sig    amLODIPine (NORVASC) 10 MG tablet Take 10 mg by mouth once daily.    aspirin (ECOTRIN) 81 MG EC tablet Take 81 mg by mouth.    calcium-vitamin D3 (OS-BIBIANA 500 + D3) 500 mg-5 mcg (200 unit) per tablet Take 1 tablet by mouth once daily.    coenzyme Q10 100 mg capsule Take 100 mg by mouth.    EScitalopram oxalate (LEXAPRO) 5 MG Tab Take 5 mg by mouth once daily.    ibandronate (BONIVA) 150 mg tablet Take 150 mg by mouth.    quinapriL (ACCUPRIL) 20 MG tablet Take 20 mg by mouth 2 (two) times daily.    triamterene-hydrochlorothiazide 37.5-25 mg (MAXZIDE-25) 37.5-25 mg per tablet Take 0.5 tablets by mouth.    ursodioL (ACTIGALL) 250 mg Tab Take by mouth.    [DISCONTINUED] cholestyramine (QUESTRAN) 4 gram packet Take 1 packet by mouth 2 (two) times daily.       Review of patient's allergies indicates:  No Known Allergies    Past Medical History:   Diagnosis Date    Arthritis     Back pain     scs implant- Dr ritter    Dyslipidemia     Hypertension     Liver disease     Spinal cord disorder      Past Surgical History:   Procedure Laterality Date    APPENDECTOMY      BACK SURGERY       SECTION      CHOLECYSTECTOMY      NECK SURGERY      SHOULDER SURGERY Bilateral     TOTAL KNEE ARTHROPLASTY       Family History     Problem Relation (Age of Onset)    Breast cancer Sister    Colon cancer Maternal Grandfather    Stroke  Mother, Sister    Thyroid disease Sister        Tobacco Use    Smoking status: Never Smoker    Smokeless tobacco: Never Used   Substance and Sexual Activity    Alcohol use: Yes     Comment: 1 beer per weekend/  4 shots of whiskey per weekend    Drug use: Never    Sexual activity: Not Currently     Review of Systems  Objective:     Vital Signs (Most Recent):  Temp: 97.7 °F (36.5 °C) (06/15/22 0803)  Pulse: 74 (06/15/22 0803)  BP: (!) 174/93 (06/15/22 0803)  SpO2: (!) 94 % (06/15/22 0803) Vital Signs (24h Range):  Temp:  [97.7 °F (36.5 °C)] 97.7 °F (36.5 °C)  Pulse:  [74] 74  SpO2:  [94 %] 94 %  BP: (174)/(93) 174/93     Weight: 78.2 kg (172 lb 6.4 oz)  Body mass index is 36.03 kg/m².    Physical Exam  Constitutional:       General: She is not in acute distress.     Appearance: Normal appearance. She is not ill-appearing.   HENT:      Head: Normocephalic and atraumatic.      Nose: Nose normal.      Mouth/Throat:      Mouth: Mucous membranes are moist.   Eyes:      Extraocular Movements: Extraocular movements intact.      Conjunctiva/sclera: Conjunctivae normal.   Cardiovascular:      Rate and Rhythm: Normal rate and regular rhythm.      Pulses: Normal pulses.      Heart sounds: Normal heart sounds.   Pulmonary:      Effort: Pulmonary effort is normal.      Breath sounds: Normal breath sounds.   Abdominal:      General: Abdomen is flat. Bowel sounds are normal. There is no distension.      Palpations: Abdomen is soft. There is no mass.      Tenderness: There is no abdominal tenderness. There is no guarding or rebound.      Hernia: A hernia is present. Hernia is present in the ventral area.   Musculoskeletal:         General: Normal range of motion.      Cervical back: Normal range of motion and neck supple.   Skin:     General: Skin is warm and dry.      Coloration: Skin is not jaundiced.   Neurological:      General: No focal deficit present.      Mental Status: She is alert.   Psychiatric:         Mood and  Affect: Mood normal.         Behavior: Behavior normal.         Significant Labs:  I have reviewed all pertinent lab results within the past 24 hours.    Significant Diagnostics:  I have reviewed all pertinent imaging results/findings within the past 24 hours.    Assessment/Plan:     There are no hospital problems to display for this patient.    VTE Risk Mitigation (From admission, onward)         Ordered     enoxaparin (LOVENOX) 40 mg/0.4 mL injection         06/15/22 0752     Place sequential compression device  Until discontinued         06/15/22 0750              Lap Ventral Hernia    Lap Ventral Hernia Repair    Nayan Gonzalez MD  General Surgery  [facility] Surgical - Periop Services

## 2022-06-15 NOTE — TRANSFER OF CARE
"Anesthesia Transfer of Care Note    Patient: Purnima Higgins    Procedure(s) Performed: Procedure(s) (LRB):  REPAIR, HERNIA, INCISIONAL, LAPAROSCOPIC (N/A)    Patient location: PACU    Anesthesia Type: general    Transport from OR: Transported from OR on room air with adequate spontaneous ventilation    Post pain: adequate analgesia    Post assessment: no apparent anesthetic complications    Post vital signs: stable    Level of consciousness: responds to stimulation    Nausea/Vomiting: no nausea/vomiting    Complications: none    Transfer of care protocol was followed      Last vitals:   Visit Vitals  BP (!) 152/71 (BP Location: Left arm, Patient Position: Lying)   Pulse 75   Temp 37 °C (98.6 °F) (Temporal)   Resp 18   Ht 4' 10" (1.473 m)   Wt 78.2 kg (172 lb 6.4 oz)   SpO2 95%   Breastfeeding No   BMI 36.03 kg/m²     "

## 2022-06-15 NOTE — HPI
HERNIA:  Purnima Higgins is an 81 y.o. female who was referred for evaluation of a umbilical hernia. Symptoms were first noted 2 years ago. Pain is located at umbilicus. Associated symptoms intermittent nausea.  Recently presented to ED 5/15/22 for umbilical pain. CT revealed short segment of small bowel containing within umbilical hernia.

## 2022-06-15 NOTE — PLAN OF CARE
Problem: Pain Acute  Goal: Acceptable Pain Control and Functional Ability  Outcome: Ongoing, Progressing  Intervention: Develop Pain Management Plan  Flowsheets (Taken 6/15/2022 1303)  Pain Management Interventions:   care clustered   position adjusted   quiet environment facilitated  Intervention: Prevent or Manage Pain  Flowsheets (Taken 6/15/2022 1303)  Sleep/Rest Enhancement:   awakenings minimized   noise level reduced   family presence promoted   room darkened  Sensory Stimulation Regulation:   auditory stimulation minimized   quiet environment promoted   visual stimulation minimized   care clustered   lighting decreased  Bowel Elimination Promotion:   adequate fluid intake promoted   ambulation promoted  Medication Review/Management: medications reviewed     Problem: Fall Injury Risk  Goal: Absence of Fall and Fall-Related Injury  Outcome: Ongoing, Progressing  Intervention: Identify and Manage Contributors  Flowsheets (Taken 6/15/2022 1303)  Self-Care Promotion: independence encouraged  Medication Review/Management: medications reviewed  Intervention: Promote Injury-Free Environment  Flowsheets (Taken 6/15/2022 1303)  Safety Promotion/Fall Prevention:   Fall Risk reviewed with patient/family   nonskid shoes/socks when out of bed   side rails raised x 2   Supervised toileting - stay within arms reach   supervised activity   instructed to call staff for mobility     Problem: Adult Inpatient Plan of Care  Goal: Patient-Specific Goal (Individualized)  Outcome: Ongoing, Progressing  Flowsheets (Taken 6/15/2022 1303)  Anxieties, Fears or Concerns: severe pain  Individualized Care Needs: minimize pain  Patient-Specific Goals (Include Timeframe): ambulate hallway one time  Goal: Optimal Comfort and Wellbeing  Outcome: Ongoing, Progressing  Intervention: Monitor Pain and Promote Comfort  Flowsheets (Taken 6/15/2022 1303)  Pain Management Interventions:   care clustered   position adjusted   quiet environment  facilitated  Intervention: Provide Person-Centered Care  Flowsheets (Taken 6/15/2022 1303)  Trust Relationship/Rapport:   care explained   questions answered  Goal: Readiness for Transition of Care  Outcome: Ongoing, Progressing     Problem: Adult Inpatient Plan of Care  Goal: Plan of Care Review  Outcome: Met  Flowsheets (Taken 6/15/2022 1303)  Plan of Care Reviewed With:   patient   spouse

## 2022-06-15 NOTE — ANESTHESIA PREPROCEDURE EVALUATION
06/15/2022  Purnima Higgins is a 81 y.o., female who presents with Incisional Hernia.  Diagnosis:        Incisional hernia, without obstruction or gangrene       (Incisional hernia, without obstruction or gangrene [K43.2])    She comes to [facility] for the noted procedure under GETA. She has had previous back and Cervical disc Sx's, now with limited ROM. We will plan for Glidescope  Intubation.  Procedure:   REPAIR, HERNIA, INCISIONAL, LAPAROSCOPIC (N/A Abdomen)        PMHx:  Hypertension Liver disease   Spinal cord disorder Arthritis   Dyslipidemia          PSHx:  APPENDECTOMY  SECTION   BACK SURGERY NECK SURGERY   CHOLECYSTECTOMY TOTAL KNEE ARTHROPLASTY   SHOULDER SURGERY         Lab Data:      Echo:  EF 60%  Mild. Tricuspid and Mitral regurg.    EKG:  Sinus rhythm. Minimal ST depression. Rt:78  Pre-op Assessment    I have reviewed the Patient Summary Reports.     I have reviewed the Nursing Notes. I have reviewed the NPO Status.   I have reviewed the Medications.     Review of Systems  Anesthesia Hx:  No problems with previous Anesthesia    Social:  Non-Smoker    Hematology/Oncology:  Hematology Normal   Oncology Normal     EENT/Dental:EENT/Dental Normal   Cardiovascular:   Exercise tolerance: good Hypertension  Functional Capacity good / => 4 METS    Pulmonary:  Pulmonary Normal    Renal/:  Renal/ Normal     Hepatic/GI:   Liver Disease,    Musculoskeletal:  Musculoskeletal Normal    Neurological:  Neurology Normal    Endocrine:  Endocrine Normal    Dermatological:  Skin Normal    Psych:  Psychiatric Normal           Physical Exam  General: Alert, Oriented, Well nourished and Cooperative    Airway:  Mallampati: II   Mouth Opening: Normal  TM Distance: Normal  Tongue: Normal  Neck ROM: Extension Decreased    Dental:  Intact    Chest/Lungs:  Clear to auscultation, Normal Respiratory  Rate    Heart:  Rate: Normal  Rhythm: Regular Rhythm        Anesthesia Plan  Type of Anesthesia, risks & benefits discussed:    Anesthesia Type: Gen ETT  Intra-op Monitoring Plan: Standard ASA Monitors  Post Op Pain Control Plan: IV/PO Opioids PRN  Induction:  IV and Inhalation  Airway Plan: Direct  Informed Consent: Informed consent signed with the Patient and all parties understand the risks and agree with anesthesia plan.  All questions answered. Patient consented to blood products? Yes  ASA Score: 2  Day of Surgery Review of History & Physical: H&P Update referred to the surgeon/provider.  Anesthesia Plan Notes: Plan for Glidescope intubation secondary to decreased ROM/Extension of Cervical vertebrae.    Ready For Surgery From Anesthesia Perspective.     .

## 2022-06-16 PROCEDURE — 25000003 PHARM REV CODE 250: Performed by: NURSE PRACTITIONER

## 2022-06-16 RX ADMIN — HYDROCODONE BITARTRATE AND ACETAMINOPHEN 1 TABLET: 5; 325 TABLET ORAL at 12:06

## 2022-06-16 RX ADMIN — HYDROCODONE BITARTRATE AND ACETAMINOPHEN 1 TABLET: 5; 325 TABLET ORAL at 03:06

## 2022-06-17 LAB
ESTROGEN SERPL-MCNC: NORMAL PG/ML
INSULIN SERPL-ACNC: NORMAL U[IU]/ML
LAB AP CLINICAL INFORMATION: NORMAL
LAB AP GROSS DESCRIPTION: NORMAL
LAB AP REPORT FOOTNOTES: NORMAL
T3RU NFR SERPL: NORMAL %

## 2022-06-17 NOTE — DISCHARGE SUMMARY
[facility] Surgical - Med Surg  General Surgery  Discharge Summary      Patient Name: Purnima Higgins  MRN: [id number]  Admission Date: 6/15/2022  Hospital Length of Stay: 0 days  Discharge Date and Time: 6/16/2022 12:50 PM  Attending Physician: Dr. Nayan Gonzalez   Discharging Provider: Dr. Nayan Gonzalez  Primary Care Provider: Jos Briseno MD     HPI: Pt presented to [facility] for elective repair of umbilical hernia.     Procedure(s) (LRB):  REPAIR, HERNIA, INCISIONAL, LAPAROSCOPIC (N/A)     Hospital Course: Procedure performed as stated above. There was evidence of incarcerated incisional hernia to umbilicus at time of surgery. Patient tolerated procedure well and was transferred to PACU then to post op surgical floor for further overnight monitoring.   Diet was advanced as tolerated, home medications resumed. Once pt ambulating, voiding, tolerating PO, and pain controlled with oral pain medication, she was discharged to home with family.    Consults: none    Significant Diagnostic Studies: none    Pending Diagnostic Studies:     Procedure Component Value Units Date/Time    EKG 12-lead [[id number]]     Order Status: Sent Lab Status: No result     Specimen to Pathology [[id number]] Collected: 06/15/22 1025    Order Status: Sent Lab Status: In process Updated: 06/15/22 1744    Specimen: Tissue from Omentum         Final Active Diagnoses:      Problems Resolved During this Admission:    Diagnosis Date Noted Date Resolved POA    PRINCIPAL PROBLEM:  Incarcerated incisional hernia [K43.0] 06/15/2022 06/15/2022 Yes    Ventral hernia [K43.9] 06/15/2022 06/15/2022 Yes      Discharged Condition: good    Disposition: Home or Self Care    Follow Up:   Follow-up Information     DOUGIE Rock Follow up on 6/30/2022.    Specialty: Family Medicine  Why: 2:00 pm  Contact information:  [address]  [address]  [facility] [address]  [phone]                       Patient Instructions:   No discharge  procedures on file.  Medications:  Reconciled Home Medications:      Medication List      START taking these medications    HYDROcodone-acetaminophen 5-325 mg per tablet  Commonly known as: NORCO  Take 1 tablet by mouth every 6 (six) hours as needed for Pain.        CONTINUE taking these medications    amLODIPine 10 MG tablet  Commonly known as: NORVASC  Take 10 mg by mouth once daily.     aspirin 81 MG EC tablet  Commonly known as: ECOTRIN  Take 81 mg by mouth.     calcium-vitamin D3 500 mg-5 mcg (200 unit) per tablet  Commonly known as: OS-BIBIANA 500 + D3  Take 1 tablet by mouth once daily.     cholestyramine 4 gram packet  Commonly known as: QUESTRAN  TAKE 1 PACKET TWICE A DAY     coenzyme Q10 100 mg capsule  Take 100 mg by mouth.     EScitalopram oxalate 5 MG Tab  Commonly known as: LEXAPRO  Take 5 mg by mouth once daily.     ibandronate 150 mg tablet  Commonly known as: BONIVA  Take 150 mg by mouth.     quinapriL 20 MG tablet  Commonly known as: ACCUPRIL  Take 20 mg by mouth 2 (two) times daily.     triamterene-hydrochlorothiazide 37.5-25 mg 37.5-25 mg per tablet  Commonly known as: MAXZIDE-25  Take 0.5 tablets by mouth.     ursodioL 250 mg Tab  Commonly known as: ACTIGALL  Take by mouth.            Discharged to home with family.   Follow up appt arranged in 2 weeks with Dr. Gonzalez.  Discharge instructions provided to patient and family prior to discharge.     Neetu Ferraro, ASH  General Surgery  [facility] - Med [facility]

## 2022-06-20 VITALS
TEMPERATURE: 98 F | HEIGHT: 58 IN | DIASTOLIC BLOOD PRESSURE: 72 MMHG | BODY MASS INDEX: 36.18 KG/M2 | HEART RATE: 82 BPM | WEIGHT: 172.38 LBS | SYSTOLIC BLOOD PRESSURE: 134 MMHG | RESPIRATION RATE: 18 BRPM | OXYGEN SATURATION: 95 %

## 2022-08-18 NOTE — OP NOTE
[facility] Surgical - Med Surg  Surgery Department  Operative Note    SUMMARY     Date of Procedure: 6/15/2022     Procedure: Procedure(s) (LRB):  REPAIR, HERNIA, INCISIONAL, LAPAROSCOPIC (N/A)     Surgeon(s) and Role:     * Nayan Gonzalez MD - Primary    Assisting Surgeon: None    Pre-Operative Diagnosis: Incisional hernia, without obstruction or gangrene [K43.2]    Post-Operative Diagnosis: Post-Op Diagnosis Codes:     * Incisional hernia, without obstruction or gangrene [K43.2]    Anesthesia: General      Description of Technical Procedures: Patient was taken to the OR.  The abdomen was prepped and draped in the usual sterile fashion after successfel general anesthetic administered.  An optical tipped trocar was used to access the peritoneal cavity.  The abdomen was explored and additional working ports were placed under direct vision.  No visceral injury was noted from placement of trocars. The hernia was noted and measured.  The contents were reduced.  A portion of parietex mesh was placed intraperitoneally to cover the defect with a 3 cm overlap circumferentially.  This was fixed with combination of trans fascial permanent suture and spiral tacks.  The remainder of the exploration revealed no other pathology.  The trocars were removed.  The incisions closed with Vicryl.       Estimated Blood Loss (EBL): * No values recorded between 6/15/2022  9:33 AM and 6/15/2022 10:46 AM *           Implants:   Implant Name Type Inv. Item Serial No.  Lot No. LRB No. Used Action   MESH PARIETEX ROUND 9CM - [id number]  MESH PARIETEX ROUND 9CM  COVIDIEN [id number] N/A 1 Implanted       Specimens:   Specimen (24h ago, onward)            None                  Condition: Good    Disposition: PACU - hemodynamically stable.    Attestation: I was present and scrubbed for the entire procedure.

## 2022-08-26 PROBLEM — R51.9 HEADACHE: Status: RESOLVED | Noted: 2022-08-26 | Resolved: 2022-08-26

## 2022-08-26 PROBLEM — G44.309 POST-TRAUMATIC HEADACHE: Status: ACTIVE | Noted: 2022-08-26

## 2022-08-26 PROBLEM — R51.9 HEADACHE: Status: ACTIVE | Noted: 2022-08-26

## 2022-08-29 PROBLEM — E66.01 SEVERE OBESITY (BMI 35.0-39.9) WITH COMORBIDITY: Status: ACTIVE | Noted: 2022-08-29

## 2022-08-29 PROBLEM — K74.3 PRIMARY BILIARY CIRRHOSIS: Status: ACTIVE | Noted: 2022-08-29

## 2022-09-27 PROBLEM — I10 HTN (HYPERTENSION): Status: ACTIVE | Noted: 2022-09-27

## 2022-09-27 PROCEDURE — 82105 ALPHA-FETOPROTEIN SERUM: CPT | Performed by: NURSE PRACTITIONER

## 2022-09-27 PROCEDURE — 85610 PROTHROMBIN TIME: CPT | Performed by: NURSE PRACTITIONER

## 2022-09-29 PROCEDURE — 83970 ASSAY OF PARATHORMONE: CPT | Performed by: NURSE PRACTITIONER

## 2022-10-25 PROBLEM — N32.81 OVERACTIVE BLADDER: Status: ACTIVE | Noted: 2022-10-25

## 2022-12-01 PROBLEM — M85.80 OSTEOPENIA: Status: ACTIVE | Noted: 2022-12-01

## 2023-01-13 ENCOUNTER — HOSPITAL ENCOUNTER (INPATIENT)
Facility: HOSPITAL | Age: 83
LOS: 14 days | Discharge: HOME HEALTH | DRG: 166 | End: 2023-01-27
Attending: EMERGENCY MEDICINE | Admitting: INTERNAL MEDICINE
Payer: MEDICARE

## 2023-01-13 DIAGNOSIS — R60.0 BILATERAL LOWER EXTREMITY EDEMA: ICD-10-CM

## 2023-01-13 DIAGNOSIS — M85.80 OSTEOPENIA, UNSPECIFIED LOCATION: ICD-10-CM

## 2023-01-13 DIAGNOSIS — J90 LOCULATED PLEURAL EFFUSION: ICD-10-CM

## 2023-01-13 DIAGNOSIS — J90 PLEURAL EFFUSION: ICD-10-CM

## 2023-01-13 DIAGNOSIS — R07.9 CHEST PAIN: ICD-10-CM

## 2023-01-13 DIAGNOSIS — A41.9 SEPSIS: ICD-10-CM

## 2023-01-13 DIAGNOSIS — R06.02 SOB (SHORTNESS OF BREATH): ICD-10-CM

## 2023-01-13 DIAGNOSIS — J90 PLEURAL EFFUSION ON LEFT: Primary | ICD-10-CM

## 2023-01-13 DIAGNOSIS — E87.1 HYPONATREMIA: ICD-10-CM

## 2023-01-13 LAB
ALBUMIN SERPL-MCNC: 2.9 G/DL (ref 3.4–4.8)
ALBUMIN/GLOB SERPL: 0.8 RATIO (ref 1.1–2)
ALP SERPL-CCNC: 97 UNIT/L (ref 40–150)
ALT SERPL-CCNC: 19 UNIT/L (ref 0–55)
AST SERPL-CCNC: 30 UNIT/L (ref 5–34)
BASOPHILS # BLD AUTO: 0.08 X10(3)/MCL (ref 0–0.2)
BASOPHILS NFR BLD AUTO: 0.9 %
BILIRUBIN DIRECT+TOT PNL SERPL-MCNC: 0.8 MG/DL
BNP BLD-MCNC: 89.8 PG/ML
BUN SERPL-MCNC: 21.1 MG/DL (ref 9.8–20.1)
CALCIUM SERPL-MCNC: 8.8 MG/DL (ref 8.4–10.2)
CHLORIDE SERPL-SCNC: 95 MMOL/L (ref 98–107)
CO2 SERPL-SCNC: 20 MMOL/L (ref 23–31)
CREAT SERPL-MCNC: 1.04 MG/DL (ref 0.55–1.02)
EOSINOPHIL # BLD AUTO: 0.26 X10(3)/MCL (ref 0–0.9)
EOSINOPHIL NFR BLD AUTO: 2.8 %
ERYTHROCYTE [DISTWIDTH] IN BLOOD BY AUTOMATED COUNT: 12.7 % (ref 11.5–17)
FLUAV AG UPPER RESP QL IA.RAPID: NOT DETECTED
FLUBV AG UPPER RESP QL IA.RAPID: NOT DETECTED
GFR SERPLBLD CREATININE-BSD FMLA CKD-EPI: 54 MLS/MIN/1.73/M2
GLOBULIN SER-MCNC: 3.8 GM/DL (ref 2.4–3.5)
GLUCOSE SERPL-MCNC: 84 MG/DL (ref 82–115)
HCT VFR BLD AUTO: 35.2 % (ref 37–47)
HGB BLD-MCNC: 12.1 GM/DL (ref 12–16)
IMM GRANULOCYTES # BLD AUTO: 0.06 X10(3)/MCL (ref 0–0.04)
IMM GRANULOCYTES NFR BLD AUTO: 0.6 %
LYMPHOCYTES # BLD AUTO: 0.79 X10(3)/MCL (ref 0.6–4.6)
LYMPHOCYTES NFR BLD AUTO: 8.4 %
MCH RBC QN AUTO: 30.8 PG
MCHC RBC AUTO-ENTMCNC: 34.4 MG/DL (ref 33–36)
MCV RBC AUTO: 89.6 FL (ref 80–94)
MONOCYTES # BLD AUTO: 0.92 X10(3)/MCL (ref 0.1–1.3)
MONOCYTES NFR BLD AUTO: 9.8 %
NEUTROPHILS # BLD AUTO: 7.3 X10(3)/MCL (ref 2.1–9.2)
NEUTROPHILS NFR BLD AUTO: 77.5 %
NRBC BLD AUTO-RTO: 0 %
PLATELET # BLD AUTO: 172 X10(3)/MCL (ref 130–400)
PMV BLD AUTO: 9.9 FL (ref 7.4–10.4)
POTASSIUM SERPL-SCNC: 4.9 MMOL/L (ref 3.5–5.1)
PROT SERPL-MCNC: 6.7 GM/DL (ref 5.8–7.6)
RBC # BLD AUTO: 3.93 X10(6)/MCL (ref 4.2–5.4)
RSV A 5' UTR RNA NPH QL NAA+PROBE: NOT DETECTED
SARS-COV-2 RNA RESP QL NAA+PROBE: NOT DETECTED
SODIUM SERPL-SCNC: 125 MMOL/L (ref 136–145)
TROPONIN I SERPL-MCNC: <0.01 NG/ML (ref 0–0.04)
WBC # SPEC AUTO: 9.4 X10(3)/MCL (ref 4.5–11.5)

## 2023-01-13 PROCEDURE — 93010 ELECTROCARDIOGRAM REPORT: CPT | Mod: ,,, | Performed by: STUDENT IN AN ORGANIZED HEALTH CARE EDUCATION/TRAINING PROGRAM

## 2023-01-13 PROCEDURE — 25500020 PHARM REV CODE 255: Performed by: EMERGENCY MEDICINE

## 2023-01-13 PROCEDURE — 93010 EKG 12-LEAD: ICD-10-PCS | Mod: ,,, | Performed by: STUDENT IN AN ORGANIZED HEALTH CARE EDUCATION/TRAINING PROGRAM

## 2023-01-13 PROCEDURE — 84484 ASSAY OF TROPONIN QUANT: CPT | Performed by: NURSE PRACTITIONER

## 2023-01-13 PROCEDURE — 93005 ELECTROCARDIOGRAM TRACING: CPT

## 2023-01-13 PROCEDURE — 99285 EMERGENCY DEPT VISIT HI MDM: CPT | Mod: 25

## 2023-01-13 PROCEDURE — 0241U COVID/RSV/FLU A&B PCR: CPT | Performed by: NURSE PRACTITIONER

## 2023-01-13 PROCEDURE — 83880 ASSAY OF NATRIURETIC PEPTIDE: CPT | Performed by: NURSE PRACTITIONER

## 2023-01-13 PROCEDURE — 80053 COMPREHEN METABOLIC PANEL: CPT | Performed by: NURSE PRACTITIONER

## 2023-01-13 PROCEDURE — 11000001 HC ACUTE MED/SURG PRIVATE ROOM

## 2023-01-13 PROCEDURE — 85025 COMPLETE CBC W/AUTO DIFF WBC: CPT | Performed by: NURSE PRACTITIONER

## 2023-01-13 RX ADMIN — IOPAMIDOL 70 ML: 755 INJECTION, SOLUTION INTRAVENOUS at 09:01

## 2023-01-13 NOTE — FIRST PROVIDER EVALUATION
"Medical screening examination initiated.  I have conducted a focused provider triage encounter, findings are as follows:  Chief Complaint   Patient presents with    Shortness of Breath     Reports cough x 3 weeks and has been attempting outpatient treatment with PCP. ( Joann Wallace, NP called to say pt has LLL pneumonia, pleural effusion and she has treated with doxycycline, prednisone, and is on day 10 of levaquin). New onset swelling in BL feet/ankles x 2 days. TMAX 100.2  Pt has implanted stimulator and does not have remote with her to turn off for EKG         Brief history of present illness:  83 y/o female who presents with cough for 3 weeks and has been on levaquin, prednisone, doxycycline. Now having swelling bilateral LE. +fever    Vitals:    01/13/23 1129   BP: (!) 141/76   BP Location: Left arm   Patient Position: Sitting   Pulse: 89   Resp: (!) 22   Temp: 97.6 °F (36.4 °C)   TempSrc: Oral   SpO2: (!) 93%   Weight: 79.4 kg (175 lb)   Height: 4' 10" (1.473 m)       Pertinent physical exam:  alert, mildly labored, on oxygen, in wheelchair    Brief workup plan:  labs, imaging, ekg    Preliminary workup initiated; this workup will be continued and followed by the physician or advanced practice provider that is assigned to the patient when roomed.  "

## 2023-01-14 PROBLEM — J90 PLEURAL EFFUSION ON LEFT: Status: ACTIVE | Noted: 2023-01-14

## 2023-01-14 LAB
ALBUMIN SERPL-MCNC: 2.5 G/DL (ref 3.4–4.8)
ALBUMIN/GLOB SERPL: 0.8 RATIO (ref 1.1–2)
ALP SERPL-CCNC: 84 UNIT/L (ref 40–150)
ALT SERPL-CCNC: 17 UNIT/L (ref 0–55)
APTT PPP: 29.8 SECONDS (ref 23.2–33.7)
AST SERPL-CCNC: 24 UNIT/L (ref 5–34)
B PERT.PT PRMT NPH QL NAA+NON-PROBE: NOT DETECTED
BILIRUBIN DIRECT+TOT PNL SERPL-MCNC: 0.6 MG/DL
BUN SERPL-MCNC: 18.3 MG/DL (ref 9.8–20.1)
C PNEUM DNA NPH QL NAA+NON-PROBE: NOT DETECTED
CALCIUM SERPL-MCNC: 8.8 MG/DL (ref 8.4–10.2)
CHLORIDE SERPL-SCNC: 99 MMOL/L (ref 98–107)
CO2 SERPL-SCNC: 22 MMOL/L (ref 23–31)
CREAT SERPL-MCNC: 0.87 MG/DL (ref 0.55–1.02)
CRP SERPL HS-MCNC: 95.94 MG/L
ERYTHROCYTE [DISTWIDTH] IN BLOOD BY AUTOMATED COUNT: 12.9 % (ref 11.5–17)
ERYTHROCYTE [SEDIMENTATION RATE] IN BLOOD: 55 MM/HR (ref 0–20)
GFR SERPLBLD CREATININE-BSD FMLA CKD-EPI: >60 MLS/MIN/1.73/M2
GLOBULIN SER-MCNC: 3.3 GM/DL (ref 2.4–3.5)
GLUCOSE SERPL-MCNC: 100 MG/DL (ref 82–115)
HADV DNA NPH QL NAA+NON-PROBE: NOT DETECTED
HCOV 229E RNA NPH QL NAA+NON-PROBE: NOT DETECTED
HCOV HKU1 RNA NPH QL NAA+NON-PROBE: NOT DETECTED
HCOV NL63 RNA NPH QL NAA+NON-PROBE: NOT DETECTED
HCOV OC43 RNA NPH QL NAA+NON-PROBE: NOT DETECTED
HCT VFR BLD AUTO: 32.2 % (ref 37–47)
HGB BLD-MCNC: 11.3 GM/DL (ref 12–16)
HMPV RNA NPH QL NAA+NON-PROBE: NOT DETECTED
HPIV1 RNA NPH QL NAA+NON-PROBE: NOT DETECTED
HPIV2 RNA NPH QL NAA+NON-PROBE: NOT DETECTED
HPIV3 RNA NPH QL NAA+NON-PROBE: NOT DETECTED
HPIV4 RNA NPH QL NAA+NON-PROBE: NOT DETECTED
INR BLD: 1.02 (ref 0–1.3)
LDH SERPL-CCNC: 156 U/L (ref 125–220)
M PNEUMO DNA NPH QL NAA+NON-PROBE: NOT DETECTED
MAGNESIUM SERPL-MCNC: 2 MG/DL (ref 1.6–2.6)
MCH RBC QN AUTO: 31 PG
MCHC RBC AUTO-ENTMCNC: 35.1 MG/DL (ref 33–36)
MCV RBC AUTO: 88.5 FL (ref 80–94)
MRSA PCR SCRN (OHS): NOT DETECTED
NRBC BLD AUTO-RTO: 0 %
OSMOLALITY SERPL: 277 MOSM/KG (ref 280–300)
OSMOLALITY UR: 333 MOSM/KG (ref 300–1300)
PHOSPHATE SERPL-MCNC: 4 MG/DL (ref 2.3–4.7)
PLATELET # BLD AUTO: 154 X10(3)/MCL (ref 130–400)
PMV BLD AUTO: 9.8 FL (ref 7.4–10.4)
POTASSIUM SERPL-SCNC: 4.7 MMOL/L (ref 3.5–5.1)
PROT SERPL-MCNC: 5.8 GM/DL (ref 5.8–7.6)
PROTHROMBIN TIME: 13.3 SECONDS (ref 12.5–14.5)
RBC # BLD AUTO: 3.64 X10(6)/MCL (ref 4.2–5.4)
RSV RNA NPH QL NAA+NON-PROBE: NOT DETECTED
RV+EV RNA NPH QL NAA+NON-PROBE: NOT DETECTED
SODIUM SERPL-SCNC: 129 MMOL/L (ref 136–145)
SODIUM UR-SCNC: 36 MMOL/L
WBC # SPEC AUTO: 8.5 X10(3)/MCL (ref 4.5–11.5)

## 2023-01-14 PROCEDURE — 80053 COMPREHEN METABOLIC PANEL: CPT | Performed by: INTERNAL MEDICINE

## 2023-01-14 PROCEDURE — 83930 ASSAY OF BLOOD OSMOLALITY: CPT | Performed by: INTERNAL MEDICINE

## 2023-01-14 PROCEDURE — 63600175 PHARM REV CODE 636 W HCPCS: Performed by: HOSPITALIST

## 2023-01-14 PROCEDURE — 83735 ASSAY OF MAGNESIUM: CPT | Performed by: INTERNAL MEDICINE

## 2023-01-14 PROCEDURE — 87070 CULTURE OTHR SPECIMN AEROBIC: CPT | Performed by: INTERNAL MEDICINE

## 2023-01-14 PROCEDURE — 25000003 PHARM REV CODE 250: Performed by: INTERNAL MEDICINE

## 2023-01-14 PROCEDURE — 25000242 PHARM REV CODE 250 ALT 637 W/ HCPCS: Performed by: INTERNAL MEDICINE

## 2023-01-14 PROCEDURE — 86141 C-REACTIVE PROTEIN HS: CPT | Performed by: INTERNAL MEDICINE

## 2023-01-14 PROCEDURE — 25000003 PHARM REV CODE 250: Performed by: HOSPITALIST

## 2023-01-14 PROCEDURE — 84145 PROCALCITONIN (PCT): CPT | Performed by: INTERNAL MEDICINE

## 2023-01-14 PROCEDURE — 84100 ASSAY OF PHOSPHORUS: CPT | Performed by: INTERNAL MEDICINE

## 2023-01-14 PROCEDURE — 85027 COMPLETE CBC AUTOMATED: CPT | Performed by: INTERNAL MEDICINE

## 2023-01-14 PROCEDURE — 85610 PROTHROMBIN TIME: CPT | Performed by: INTERNAL MEDICINE

## 2023-01-14 PROCEDURE — 87641 MR-STAPH DNA AMP PROBE: CPT | Performed by: INTERNAL MEDICINE

## 2023-01-14 PROCEDURE — 83615 LACTATE (LD) (LDH) ENZYME: CPT | Performed by: INTERNAL MEDICINE

## 2023-01-14 PROCEDURE — 99900035 HC TECH TIME PER 15 MIN (STAT)

## 2023-01-14 PROCEDURE — 85651 RBC SED RATE NONAUTOMATED: CPT | Performed by: INTERNAL MEDICINE

## 2023-01-14 PROCEDURE — 11000001 HC ACUTE MED/SURG PRIVATE ROOM

## 2023-01-14 PROCEDURE — 83935 ASSAY OF URINE OSMOLALITY: CPT | Performed by: INTERNAL MEDICINE

## 2023-01-14 PROCEDURE — 84300 ASSAY OF URINE SODIUM: CPT | Performed by: INTERNAL MEDICINE

## 2023-01-14 PROCEDURE — 0202U NFCT DS 22 TRGT SARS-COV-2: CPT | Performed by: INTERNAL MEDICINE

## 2023-01-14 PROCEDURE — 85730 THROMBOPLASTIN TIME PARTIAL: CPT | Performed by: INTERNAL MEDICINE

## 2023-01-14 PROCEDURE — 27000221 HC OXYGEN, UP TO 24 HOURS

## 2023-01-14 RX ORDER — URSODIOL 250 MG/1
500 TABLET, FILM COATED ORAL 2 TIMES DAILY
Status: DISCONTINUED | OUTPATIENT
Start: 2023-01-14 | End: 2023-01-27 | Stop reason: HOSPADM

## 2023-01-14 RX ORDER — TALC
6 POWDER (GRAM) TOPICAL NIGHTLY PRN
Status: DISCONTINUED | OUTPATIENT
Start: 2023-01-14 | End: 2023-01-27 | Stop reason: HOSPADM

## 2023-01-14 RX ORDER — POLYETHYLENE GLYCOL 3350 17 G/17G
17 POWDER, FOR SOLUTION ORAL 2 TIMES DAILY PRN
Status: DISCONTINUED | OUTPATIENT
Start: 2023-01-14 | End: 2023-01-27 | Stop reason: HOSPADM

## 2023-01-14 RX ORDER — TRIAMTERENE/HYDROCHLOROTHIAZID 37.5-25 MG
TABLET ORAL
COMMUNITY
Start: 2023-01-13 | End: 2023-01-14

## 2023-01-14 RX ORDER — IPRATROPIUM BROMIDE AND ALBUTEROL SULFATE 2.5; .5 MG/3ML; MG/3ML
3 SOLUTION RESPIRATORY (INHALATION)
Status: DISCONTINUED | OUTPATIENT
Start: 2023-01-14 | End: 2023-01-14

## 2023-01-14 RX ORDER — AMOXICILLIN 250 MG
2 CAPSULE ORAL 2 TIMES DAILY PRN
Status: DISCONTINUED | OUTPATIENT
Start: 2023-01-14 | End: 2023-01-27 | Stop reason: HOSPADM

## 2023-01-14 RX ORDER — ONDANSETRON 2 MG/ML
4 INJECTION INTRAMUSCULAR; INTRAVENOUS EVERY 4 HOURS PRN
Status: DISCONTINUED | OUTPATIENT
Start: 2023-01-14 | End: 2023-01-27 | Stop reason: HOSPADM

## 2023-01-14 RX ORDER — CHOLESTYRAMINE 4 G/9G
1 POWDER, FOR SUSPENSION ORAL 2 TIMES DAILY
Status: DISCONTINUED | OUTPATIENT
Start: 2023-01-14 | End: 2023-01-27 | Stop reason: HOSPADM

## 2023-01-14 RX ORDER — SODIUM CHLORIDE 9 MG/ML
INJECTION, SOLUTION INTRAVENOUS CONTINUOUS
Status: DISCONTINUED | OUTPATIENT
Start: 2023-01-14 | End: 2023-01-15

## 2023-01-14 RX ORDER — GUAIFENESIN/DEXTROMETHORPHAN 100-10MG/5
5 SYRUP ORAL EVERY 4 HOURS PRN
Status: DISCONTINUED | OUTPATIENT
Start: 2023-01-14 | End: 2023-01-27 | Stop reason: HOSPADM

## 2023-01-14 RX ORDER — AMITRIPTYLINE HYDROCHLORIDE 25 MG/1
25 TABLET, FILM COATED ORAL NIGHTLY PRN
Status: DISCONTINUED | OUTPATIENT
Start: 2023-01-14 | End: 2023-01-27 | Stop reason: HOSPADM

## 2023-01-14 RX ORDER — ESCITALOPRAM OXALATE 5 MG/1
5 TABLET ORAL DAILY
Status: DISCONTINUED | OUTPATIENT
Start: 2023-01-14 | End: 2023-01-20

## 2023-01-14 RX ORDER — MAG HYDROX/ALUMINUM HYD/SIMETH 200-200-20
30 SUSPENSION, ORAL (FINAL DOSE FORM) ORAL 4 TIMES DAILY PRN
Status: DISCONTINUED | OUTPATIENT
Start: 2023-01-14 | End: 2023-01-27 | Stop reason: HOSPADM

## 2023-01-14 RX ORDER — GUAIFENESIN 600 MG/1
600 TABLET, EXTENDED RELEASE ORAL 2 TIMES DAILY
Status: DISCONTINUED | OUTPATIENT
Start: 2023-01-14 | End: 2023-01-18

## 2023-01-14 RX ORDER — LACTOBACILLUS ACIDOPHILUS 500MM CELL
1 CAPSULE ORAL DAILY
Status: DISCONTINUED | OUTPATIENT
Start: 2023-01-14 | End: 2023-01-27 | Stop reason: HOSPADM

## 2023-01-14 RX ORDER — FERROUS SULFATE, DRIED 160(50) MG
1 TABLET, EXTENDED RELEASE ORAL DAILY
Status: DISCONTINUED | OUTPATIENT
Start: 2023-01-14 | End: 2023-01-27 | Stop reason: HOSPADM

## 2023-01-14 RX ORDER — PROCHLORPERAZINE EDISYLATE 5 MG/ML
5 INJECTION INTRAMUSCULAR; INTRAVENOUS EVERY 6 HOURS PRN
Status: DISCONTINUED | OUTPATIENT
Start: 2023-01-14 | End: 2023-01-27 | Stop reason: HOSPADM

## 2023-01-14 RX ORDER — VALSARTAN 80 MG/1
160 TABLET ORAL DAILY
Status: DISCONTINUED | OUTPATIENT
Start: 2023-01-14 | End: 2023-01-27 | Stop reason: HOSPADM

## 2023-01-14 RX ORDER — SIMETHICONE 80 MG
1 TABLET,CHEWABLE ORAL 4 TIMES DAILY PRN
Status: DISCONTINUED | OUTPATIENT
Start: 2023-01-14 | End: 2023-01-27 | Stop reason: HOSPADM

## 2023-01-14 RX ORDER — AMLODIPINE BESYLATE 5 MG/1
10 TABLET ORAL DAILY
Status: DISCONTINUED | OUTPATIENT
Start: 2023-01-14 | End: 2023-01-27 | Stop reason: HOSPADM

## 2023-01-14 RX ORDER — OXYBUTYNIN CHLORIDE 5 MG/1
5 TABLET ORAL 2 TIMES DAILY
Status: DISCONTINUED | OUTPATIENT
Start: 2023-01-14 | End: 2023-01-18

## 2023-01-14 RX ORDER — IPRATROPIUM BROMIDE AND ALBUTEROL SULFATE 2.5; .5 MG/3ML; MG/3ML
3 SOLUTION RESPIRATORY (INHALATION) EVERY 4 HOURS PRN
Status: DISCONTINUED | OUTPATIENT
Start: 2023-01-14 | End: 2023-01-27 | Stop reason: HOSPADM

## 2023-01-14 RX ORDER — SODIUM CHLORIDE 0.9 % (FLUSH) 0.9 %
10 SYRINGE (ML) INJECTION
Status: DISCONTINUED | OUTPATIENT
Start: 2023-01-14 | End: 2023-01-27 | Stop reason: HOSPADM

## 2023-01-14 RX ADMIN — CHOLESTYRAMINE 4 G: 4 POWDER, FOR SUSPENSION ORAL at 09:01

## 2023-01-14 RX ADMIN — ESCITALOPRAM OXALATE 5 MG: 5 TABLET, FILM COATED ORAL at 09:01

## 2023-01-14 RX ADMIN — SODIUM CHLORIDE: 9 INJECTION, SOLUTION INTRAVENOUS at 07:01

## 2023-01-14 RX ADMIN — VALSARTAN 160 MG: 80 TABLET, FILM COATED ORAL at 10:01

## 2023-01-14 RX ADMIN — URSODIOL 500 MG: 250 TABLET ORAL at 09:01

## 2023-01-14 RX ADMIN — OXYBUTYNIN CHLORIDE 5 MG: 5 TABLET ORAL at 08:01

## 2023-01-14 RX ADMIN — THERA TABS 1 TABLET: TAB at 10:01

## 2023-01-14 RX ADMIN — Medication 1 CAPSULE: at 10:01

## 2023-01-14 RX ADMIN — AMLODIPINE BESYLATE 10 MG: 5 TABLET ORAL at 10:01

## 2023-01-14 RX ADMIN — UMECLIDINIUM BROMIDE AND VILANTEROL TRIFENATATE 1 PUFF: 62.5; 25 POWDER RESPIRATORY (INHALATION) at 06:01

## 2023-01-14 RX ADMIN — PIPERACILLIN AND TAZOBACTAM 4.5 G: 4; .5 INJECTION, POWDER, FOR SOLUTION INTRAVENOUS; PARENTERAL at 11:01

## 2023-01-14 RX ADMIN — Medication 1 TABLET: at 10:01

## 2023-01-14 RX ADMIN — OXYBUTYNIN CHLORIDE 5 MG: 5 TABLET ORAL at 10:01

## 2023-01-14 RX ADMIN — PIPERACILLIN AND TAZOBACTAM 4.5 G: 4; .5 INJECTION, POWDER, FOR SOLUTION INTRAVENOUS; PARENTERAL at 06:01

## 2023-01-14 NOTE — PROGRESS NOTES
Seen and examined at bedside.  She was alert, lethargic, resting in the bed.  Reports getting shortness of breath over last few weeks and worsened with dry cough.  Denies any productive sputum.  Imaging reviewed.  We will obtain infection workup, get pulmonary evaluation and continue empiric antibiotics for now.  Follow urine labs.

## 2023-01-14 NOTE — H&P
[facility] Hospital Medicine - H&P Note    Patient Name: Purnima Higgins  : 1940  MRN: [id number]  PCP: Jos Briseno MD  Admitting Physician: Yefri Herrera MD  Admission Class: IP- Inpatient   Length of Stay: 1  Face-to-Face encounter date: 2023  Code status: Full    Chief Complaint   Cough and shortness breath    History of Present Illness   This is an 82-year-old female medical history as detailed below presented to the ED with complaint of persistent cough and shortness breath and outpatient chest x-ray showing left lower lobe pneumonia/pleural effusion.  Her symptom initially started the last week of 2022 and was started on doxycycline and prednisone on 2022, her symptom did not improve and continued to have cough productive of whitish sputum, she again followed up with her PCP and was prescribed levofloxacin on  2023 for 10 days.  Report low-grade fever 100.2 to 100.4 intermittent throughout the past week, and also noted slight bilateral pedal edema.  Denies chest pain or pleurisy.  Denies weight loss or night sweats.    On arrival to ED she was afebrile, hypertensive and saturating 93% on room air.  Labs showed no leukocytosis or left shift, sodium 125, creatinine 1.04, COVID 19 and flu negative, normal BNP.  CT chest with contrast show left lower lobe complete collapse consolidation, moderate volume left pleural effusion, no enlarged lymph nodes.    Referred to hospital medicine service for further evaluation and management.    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  Hypertension  Osteoporosis  Migraine headache  DDD/osteoarthritis/radiculopathy/spinal stimulator  Smoked for 6 years, quit more than 50 years ago  Anxiety/depression    Past Surgical History     Past Surgical History:   Procedure Laterality Date    APPENDECTOMY       BACK SURGERY       SECTION      CHOLECYSTECTOMY      LAPAROSCOPIC REPAIR OF INCISIONAL HERNIA N/A 6/15/2022    Procedure: REPAIR, HERNIA, INCISIONAL, LAPAROSCOPIC;  Surgeon: Nayan Gonzalez MD;  Location: [facility];  Service: General;  Laterality: N/A;    NECK SURGERY      SHOULDER SURGERY Bilateral     TOTAL KNEE ARTHROPLASTY         Social History   Smoked for 6 years, quit more than 50 years ago  No alcohol or illicit drug use    Family History   Reviewed and negative    Allergies   Acetaminophen    Home Medications     Prior to Admission medications    Medication Sig Start Date End Date Taking? Authorizing Provider   albuterol (VENTOLIN HFA) 90 mcg/actuation inhaler Inhale 2 puffs into the lungs every 4 (four) hours as needed for Wheezing. Disp 1 inhaler 22   Joann Wallace NP   amitriptyline (ELAVIL) 25 MG tablet Take 1-2 po qHS PRN headache 22   Joann Wallace NP   amLODIPine (NORVASC) 10 MG tablet Take 1 tablet (10 mg total) by mouth once daily. 22   Joann Wallace NP   aspirin (ECOTRIN) 81 MG EC tablet Take 81 mg by mouth.    Historical Provider   calcium-vitamin D3 (OS-BIBIANA 500 + D3) 500 mg-5 mcg (200 unit) per tablet Take 1 tablet by mouth once daily.    Historical Provider   cholestyramine (QUESTRAN) 4 gram packet TAKE 1 PACKET TWICE A DAY 6/15/22   Jos Briseno MD   coenzyme Q10 100 mg capsule Take 100 mg by mouth.    Historical Provider   EScitalopram oxalate (LEXAPRO) 5 MG Tab Take 5 mg by mouth once daily. 3/30/22   Historical Provider   guaiFENesin-codeine 100-10 mg/5 ml (TUSSI-ORGANIDIN NR)  mg/5 mL syrup 5-10mL po q4h prn cough 22   Joann Wallace NP   ibandronate (BONIVA) 150 mg tablet Take 1 tablet (150 mg total) by mouth every 30 days. for 12 doses 11/22/22 10/19/23  Joann Wallace NP   L.acidophilus-Bifido.longum 1 billion cell CpDR Take 1 capsule by mouth once daily.    Historical Provider   levoFLOXacin (LEVAQUIN) 500 MG tablet Take 1.5  tablets (750 mg total) by mouth once daily. 1/9/23   Joann Wallace NP   multivitamin (THERAGRAN) per tablet Take 1 tablet by mouth once daily.    Historical Provider   oxybutynin (DITROPAN-XL) 10 MG 24 hr tablet Take 1 tablet (10 mg total) by mouth once daily. 9/27/22 9/27/23  Joann Wallace NP   promethazine-dextromethorphan (PROMETHAZINE-DM) 6.25-15 mg/5 mL Syrp Take 5 mLs by mouth every 6 (six) hours as needed (cough).  Patient not taking: Reported on 1/13/2023 1/5/23 1/15/23  Joann Wallace NP   umeclidinium-vilanteroL (ANORO ELLIPTA) 62.5-25 mcg/actuation DsDv Inhale 1 puff into the lungs Daily. Controller    Historical Provider   ursodioL (ACTIGALL) 250 mg Tab Take by mouth. 3/30/22   Historical Provider   valsartan (DIOVAN) 160 MG tablet Take 1 tablet (160 mg total) by mouth once daily. 11/7/22 11/7/23  Joann Wallace NP   triamterene-hydrochlorothiazide 37.5-25 mg (MAXZIDE-25) 37.5-25 mg per tablet  1/13/23 1/14/23  Historical Provider       Physical Exam   Vital Signs  Pulse:  [76-92]   Resp:  [20]   BP: (138-168)/(69-80)   SpO2:  [94 %-95 %]    General: Appears comfortable  HEENT: NC/AT  Neck:  No JVD  Chest:  Diminished breath sound at the right lung base, scattered rhonchi  CVS: Regular rhythm. Normal S1/S2.  Trace pedal edema  Abdomen: nondistended, normoactive BS, soft and non-tender.  MSK: No obvious deformity or joint swelling  Skin: Warm and dry  Neuro: AAOx3, no focal neurological deficit  Psych: Cooperative    Labs     Recent Labs     01/13/23  1202   WBC 9.4   RBC 3.93*   HGB 12.1   HCT 35.2*   MCV 89.6   MCH 30.8   MCHC 34.4   RDW 12.7         Recent Labs     01/13/23  1202   *   K 4.9   CHLORIDE 95*   CO2 20*   BUN 21.1*   CREATININE 1.04*   EGFRNORACEVR 54   GLUCOSE 84   CALCIUM 8.8   ALBUMIN 2.9*   GLOBULIN 3.8*   ALKPHOS 97   ALT 19   AST 30   BILITOT 0.8   BNP 89.8       Recent Labs     01/13/23  1202   TROPONINI <0.010        Microbiology Results (last 7 days)        Procedure Component Value Units Date/Time    Respiratory Culture [[id number]]     Order Status: Sent Specimen: Sputum, Expectorated            Imaging     CT Chest With Contrast   Final Result      1. Left lower lung lobe complete collapse consolidation.      2. Moderate volume left pleural effusion and trace of right pleural effusion      3. Chronic appearing compression deformity of T11 vertebral body.         Electronically signed by: Mikie Sanon   Date:    01/13/2023   Time:    22:34      X-Ray Chest 1 View   Final Result      Unchanged small left pleural effusion.         Electronically signed by: Rosa Butler   Date:    01/13/2023   Time:    11:48        Assessment & Plan   Acute hypoxemic respiratory failure  Left pleural effusion - ?Parapneumonic effusion  Hyponatremia - on thiazide diuretic    HX PBC on ursodiol and cholestyramine, HTN, DDD/osteoarthritis on spinal stimulator, migraine headache    HX short period (6 years) of smoking in her 30s    Plan:    Sputum culture, MRSA PCR, respiratory PCR panel  ESR, CRP, procalcitonin  Pulmonology consult for thoracentesis also might need bronchoscopy to rule out endobronchial lesion  Will hold on IV antibiotics given patient currently hemodynamically stable without signs of sepsis and already completed 10 days of doxycycline followed by another 10 days of levofloxacin.  Mucinex b.i.d. and as needed bronchodilators  Check urine osmolarity, serum osmolarity and urine sodium  Normal saline at 100 mL/hour  Home medication reviewed and resumed, discontinue HCTZ/triamterene  VTE Prophylaxis: SCD, holding pharmacological prophylaxis pending possible thoracentesis    Yefri Herrera MD  Internal Medicine

## 2023-01-14 NOTE — ED PROVIDER NOTES
Encounter Date: 2023    SCRIBE #1 NOTE: I, Zarina Soria, am scribing for, and in the presence of,  Kulwinder Tatum MD. I have scribed the following portions of the note - Other sections scribed: MDM.     History     Chief Complaint   Patient presents with    Shortness of Breath     Reports cough x 3 weeks and has been attempting outpatient treatment with PCP. ( Joann Wallace, IVAN called to say pt has LLL pneumonia, pleural effusion and she has treated with doxycycline, prednisone, and is on day 10 of levaquin). New onset swelling in BL feet/ankles x 2 days. TMAX 100.2  Pt has implanted stimulator and does not have remote with her to turn off for EKG     This patient presents by referral from her PCP for persistent cough, persistent possible pneumonia with pleural effusion along with lower extremity edema.  Patient began just a few weeks ago with a nonspecific cough, was treated with outpatient antibiotics along with prednisone.  She subsequently developed a pleural effusion on x-ray and possible infiltrate as well.  This has failed to clear up, and the symptoms have actually worsened over the last few days.  Patient has subjective fevers at night.  She does have some shortness of breath as well.  She developed lower extremity swelling over the last 1-2 days and so was directed to the hospital for further evaluation and treatment.  The patient's symptoms are gradual, constant, moderate and are currently present.      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 NOAH Gonzáles     Past Medical History:   Diagnosis Date    Arthritis     Back pain     scs implant- Dr ritter    Dyslipidemia     Hypertension     Liver disease     Spinal cord disorder      Past Surgical History:   Procedure Laterality Date    APPENDECTOMY      BACK SURGERY       SECTION      CHOLECYSTECTOMY      LAPAROSCOPIC REPAIR OF INCISIONAL  HERNIA N/A 6/15/2022    Procedure: REPAIR, HERNIA, INCISIONAL, LAPAROSCOPIC;  Surgeon: Nayan Gonzalez MD;  Location: [facility];  Service: General;  Laterality: N/A;    NECK SURGERY      SHOULDER SURGERY Bilateral     TOTAL KNEE ARTHROPLASTY       Family History   Problem Relation Age of Onset    Stroke Mother     Stroke Sister     Breast cancer Sister     Thyroid disease Sister     Colon cancer Maternal Grandfather      Social History     Tobacco Use    Smoking status: Never    Smokeless tobacco: Never   Substance Use Topics    Alcohol use: Yes     Comment: 1 beer per weekend/  4 shots of whiskey per weekend    Drug use: Never     Review of Systems   Constitutional:  Negative for chills, fatigue and fever.   HENT:  Negative for congestion and sore throat.    Eyes:  Negative for visual disturbance.   Respiratory:  Negative for cough and shortness of breath.    Cardiovascular:  Negative for chest pain.   Gastrointestinal:  Negative for abdominal pain, diarrhea, nausea and vomiting.   Genitourinary:  Negative for dysuria.   Musculoskeletal:  Negative for myalgias.   Skin:  Negative for rash.   Neurological:  Negative for weakness, numbness and headaches.   All other systems reviewed and are negative.    Physical Exam     Initial Vitals [01/13/23 1129]   BP Pulse Resp Temp SpO2   (!) 141/76 89 (!) 22 97.6 °F (36.4 °C) (!) 93 %      MAP       --         Physical Exam    Nursing note and vitals reviewed.  Constitutional: She appears well-developed and well-nourished.   HENT:   Head: Normocephalic and atraumatic.   Mouth/Throat: Oropharynx is clear and moist.   Eyes: Pupils are equal, round, and reactive to light.   Neck: Neck supple.   Normal range of motion.  Cardiovascular:  Normal rate, regular rhythm, normal heart sounds and intact distal pulses.           Pulmonary/Chest:   Diminished breath sounds on left   Abdominal: Abdomen is soft. Bowel sounds are normal. There is no abdominal tenderness. There is no rebound.    Musculoskeletal:         General: Edema present. No tenderness. Normal range of motion.      Cervical back: Normal range of motion and neck supple.      Comments: 1+ lower extremity edema bilaterally     Neurological: She is alert and oriented to person, place, and time. She has normal strength. No sensory deficit. GCS score is 15. GCS eye subscore is 4. GCS verbal subscore is 5. GCS motor subscore is 6.   Skin: Skin is warm and dry.   Psychiatric: She has a normal mood and affect.       ED Course   Procedures  Labs Reviewed   CBC WITH DIFFERENTIAL - Abnormal; Notable for the following components:       Result Value    RBC 3.93 (*)     Hct 35.2 (*)     IG# 0.06 (*)     All other components within normal limits   COMPREHENSIVE METABOLIC PANEL - Abnormal; Notable for the following components:    Sodium Level 125 (*)     Chloride 95 (*)     Carbon Dioxide 20 (*)     Blood Urea Nitrogen 21.1 (*)     Creatinine 1.04 (*)     Albumin Level 2.9 (*)     Globulin 3.8 (*)     Albumin/Globulin Ratio 0.8 (*)     All other components within normal limits   TROPONIN I - Normal   B-TYPE NATRIURETIC PEPTIDE - Normal   COVID/RSV/FLU A&B PCR - Normal    Narrative:     The Xpert Xpress SARS-CoV-2/FLU/RSV plus is a rapid, multiplexed real-time PCR test intended for the simultaneous qualitative detection and differentiation of SARS-CoV-2, Influenza A, Influenza B, and respiratory syncytial virus (RSV) viral RNA in either nasopharyngeal swab or nasal swab specimens.           EKG Readings: (Independently Interpreted)   Time 11:28 a.m.   Heart rate 93   Normal sinus rhythm   Nonspecific ST changes   Artifact     Imaging Results              CT Chest With Contrast (Final result)  Result time 01/13/23 22:34:06      Final result by Mikie Sanon MD (01/13/23 22:34:06)                   Impression:      1. Left lower lung lobe complete collapse consolidation.    2. Moderate volume left pleural effusion and trace of right pleural  effusion    3. Chronic appearing compression deformity of T11 vertebral body.      Electronically signed by: Mikie Sanon  Date:    01/13/2023  Time:    22:34               Narrative:    EXAMINATION:  CT CHEST WITH CONTRAST    CLINICAL HISTORY:  Pneumonia, unresolved;Pleural effusion, malignancy suspected;    TECHNIQUE:  Multidetector axial images were performed from thoracic inlet to below hemidiaphragms following intravenous contrast administration. Sagittal and coronal reformations performed.    Dose length product of 508 mGycm. Automated exposure control was utilized to minimize radiation dose.    COMPARISON:  Chest radiograph January 13, 2023    FINDINGS:  There is moderate volume left pleural effusion.  Left lower lung lobe complete collapse consolidation.  The left upper lung lobe is clear of acute infiltrates.  There is trace of right pleural effusion.  Right lower lung zone subpleural  atelectasis without exclusion of mild infiltrates.  There is no hazy pneumonitis or pulmonary edema.  No cystic formation or bronchiectasis.    Chest lymph nodes are not enlarged in size.  Thoracic aorta is without aneurysmal dilatation or dissection.  Cardiac chambers are enlarged in size.    There is mild dilatation distal esophagus with some fluid which may be secondary to reflux disease.  Stomach is entirely decompressed.  Adrenals are not enlarged in size.  Cholecystectomy clips.  Spinal neuro stimulator electrodes.  Posterior cervicothoracic fusions with transpedicular screws and bre constructs.  There is chronic appearing compression deformity of L1 vertebral body with some retropulsed bony fragment into the spinal canal.                                       X-Ray Chest 1 View (Final result)  Result time 01/13/23 11:48:46      Final result by Rosa Butler MD (01/13/23 11:48:46)                   Impression:      Unchanged small left pleural effusion.      Electronically signed by: Rosa  Luke  Date:    01/13/2023  Time:    11:48               Narrative:    EXAMINATION:  XR CHEST 1 VIEW    CLINICAL HISTORY:  sob;    TECHNIQUE:  AP chest    COMPARISON:  Chest x-ray dated 01/13/2023    FINDINGS:  The heart is normal in size.  There is a similar small left pleural effusion.  The right lung is clear.  There is no visible pneumothorax.                                    X-Rays:   Independently Interpreted Readings:   Other Readings:  Left-sided pleural effusion  Medications   iopamidoL (ISOVUE-370) injection 100 mL (70 mLs Intravenous Given 1/13/23 2149)     Medical Decision Making:   History:   I obtained history from: someone other than patient.       <> Summary of History: Patient's  corresponds with the patient's story.     Joann Wallace NP called to report the patient has LLL pneumonia, pleural effusion and she has been treated with doxycycline, prednisone, and is on day 10 of Levaquin.   Old Medical Records: I decided to obtain old medical records.  Old Records Summarized: records from clinic visits.       <> Summary of Records: Patient with persistent left pleural effusion, despite outpatient treatment  Differential Diagnosis:   Pneumonia, malignant pleural effusion, transudative pleural effusion, congestive heart failure, renal failure, liver failure, lung cancer  Independently Interpreted Test(s):   I have ordered and independently interpreted X-rays - see prior notes.  I have ordered and independently interpreted EKG Reading(s) - see prior notes  Clinical Tests:   Lab Tests: Ordered and Reviewed  The following lab test(s) were unremarkable: BNP, UPT, Troponin, PTT, PT, B-HCG, CMP and CBC  Radiological Study: Ordered and Reviewed  Medical Tests: Ordered and Reviewed  ED Management:  Patient looks have more of a pull effusion necessarily infiltrate, will obtain CT scan of the chest to further elucidate particularly with hyponatremia.    Significant pleural effusion, no leukocytosis or  sign of infiltrate on CT scan.  Have discussed with hospitalist will admit, as patient will need thoracentesis to further evaluate etiology.  Of note the patient does have a low albumin which may be contributing as well to the pleural effusion as well as lower extremity edema  MDM  Co-morbidities and/or factors adding to the complexity or risk for the patient?:  Liver disease, hypertension  Differential diagnoses:  See above  Decision to obtain previous or outside records?:  See above  Chart Review (nursing home, outside records, CareEverywhere):  Not applicable  Review of RX medications/new RX prescribed by me?:  Patient recently treated with doxycycline along with prednisone  My EKG Interpretation: see above  Labs/imaging/other tests obtained/considered (risk/benefits of testing discussed):  CBC, in light of potential for infection, CMP for metabolic abnormalities, evidence of renal failure or liver failure, chest x-ray for shortness of breath with cough followed by CT scan after signs of persistent abnormality on chest x-ray along with hyponatremia  Labs/tests intepretation:  CMP reveals sodium of 125, CBC completely normal, slightly elevated BUN creatinine  My independent imaging interpretation:  See radiology note  Treatment/interventions, IV fluids, IV medications:  None  Complex management (IV controlled substances, went to OR, DNR, meds requiring monitoring, transfer, etc)?:  None  Workup/treatment affected by social determinants of health?:none   Point of care US done/interpretation:  None  Consults/radiologist/EMS/social work/family discussion/alternate history:  Consulted hospitalist for admission  Advanced care planning/end of life discussion:  None  Shared decision making:  Discussed the plan of care with the patient and .  After test results, so discussed with the patient the findings, the need for admission with probable pulmonary consultation hand thoracentesis to ascertain the reason for the  fluid.  We discussed it could still be postinfectious fluid, because of malignancy or other metabolic/liver renal abnormality  ETOH/smoking/drug cessation discussion:  None  Dispo:  Admit   Other:   I have discussed this case with another health care provider.        Scribe Attestation:   Scribe #1: I performed the above scribed service and the documentation accurately describes the services I performed. I attest to the accuracy of the note.    Attending Attestation:           Physician Attestation for Scribe:  Physician Attestation Statement for Scribe #1: I, Kulwinder Tatum MD, reviewed documentation, as scribed by Zarina Soria in my presence, and it is both accurate and complete.                        Clinical Impression:   Final diagnoses:  [R06.02] SOB (shortness of breath)  [J90] Pleural effusion on left (Primary)  [E87.1] Hyponatremia  [R60.0] Bilateral lower extremity edema        ED Disposition Condition    Admit Stable                Kulwinder Tatum MD  01/13/23 5992

## 2023-01-14 NOTE — CONSULTS
[facility] - Emergency Dept  Pulmonary Critical Care Note    Patient Name: Purnima Higgins  MRN: [id number]  Admission Date: 2023  Hospital Length of Stay: 1 days  Code Status: Full Code  Attending Provider: Yefri Herrera MD  Primary Care Provider: Jos Briseno MD     Subjective:     HPI:   This is an 82-year-old female with a history of HTN, HLD, back pain with implanted stimulator who has been experiencing a cough x 3 weeks. She failed outpatient treatment by PCP having received doxycycline, prednisone and Levaquin. She was then referred to the ED for further evaluation on 23. She also reported new onset welling in her lower extremities in the past few days. CXR revealed a small left pleural effusion, unchanged from previous imaging. CT of chest redemonstrated left pleural effusion. She was admitted to hospital medicine and pulmonary was consulted for evaluation of pleural effusion. Patient is requiring supplemental oxygen at 2 L nasal cannula. Reports ongoing dry cough, no longer productive.     Hospital Course/Significant events:      24 Hour Interval History:      Past Medical History:   Diagnosis Date    Arthritis     Back pain     scs implant- Dr ritter    Dyslipidemia     Hypertension     Liver disease     Spinal cord disorder        Past Surgical History:   Procedure Laterality Date    APPENDECTOMY      BACK SURGERY       SECTION      CHOLECYSTECTOMY      LAPAROSCOPIC REPAIR OF INCISIONAL HERNIA N/A 6/15/2022    Procedure: REPAIR, HERNIA, INCISIONAL, LAPAROSCOPIC;  Surgeon: Nayan Gonzalez MD;  Location: [facility];  Service: General;  Laterality: N/A;    NECK SURGERY      SHOULDER SURGERY Bilateral     TOTAL KNEE ARTHROPLASTY         Social History     Socioeconomic History    Marital status:    Tobacco Use    Smoking status: Never    Smokeless tobacco: Never   Substance and Sexual Activity    Alcohol use: Yes     Comment: 1 beer per weekend/  4 shots of whiskey per  weekend    Drug use: Never    Sexual activity: Not Currently           Current Outpatient Medications   Medication Instructions    albuterol (VENTOLIN HFA) 90 mcg/actuation inhaler 2 puffs, Inhalation, Every 4 hours PRN, Disp 1 inhaler    amitriptyline (ELAVIL) 25 MG tablet Take 1-2 po qHS PRN headache    amLODIPine (NORVASC) 10 mg, Oral, Daily    aspirin (ECOTRIN) 81 mg, Oral    calcium-vitamin D3 (OS-BIBIANA 500 + D3) 500 mg-5 mcg (200 unit) per tablet 1 tablet, Oral, Daily    cholestyramine (QUESTRAN) 4 gram packet TAKE 1 PACKET TWICE A DAY    coenzyme Q10 100 mg, Oral    EScitalopram oxalate (LEXAPRO) 5 mg, Oral, Daily    guaiFENesin-codeine 100-10 mg/5 ml (TUSSI-ORGANIDIN NR)  mg/5 mL syrup 5-10mL po q4h prn cough    ibandronate (BONIVA) 150 mg, Oral, Every 30 days    L.acidophilus-Bifido.longum 1 billion cell CpDR 1 capsule, Oral, Daily    levoFLOXacin (LEVAQUIN) 750 mg, Oral, Daily    multivitamin (THERAGRAN) per tablet 1 tablet, Oral, Daily    oxybutynin (DITROPAN-XL) 10 mg, Oral, Daily    promethazine-dextromethorphan (PROMETHAZINE-DM) 6.25-15 mg/5 mL Syrp 5 mLs, Oral, Every 6 hours PRN    umeclidinium-vilanteroL (ANORO ELLIPTA) 62.5-25 mcg/actuation DsDv 1 puff, Inhalation, Daily, Controller    ursodioL (ACTIGALL) 250 mg Tab Oral    valsartan (DIOVAN) 160 mg, Oral, Daily       Current Inpatient Medications   amLODIPine  10 mg Oral Daily    calcium-vitamin D3  1 tablet Oral Daily    cholestyramine  1 packet Oral BID    EScitalopram oxalate  5 mg Oral Daily    Lactobacillus acidophilus  1 capsule Oral Daily    multivitamin  1 tablet Oral Daily    oxybutynin  5 mg Oral BID    umeclidinium-vilanteroL  1 puff Inhalation Daily    ursodioL  500 mg Oral BID    valsartan  160 mg Oral Daily       Current Intravenous Infusions   sodium chloride 0.9% 100 mL/hr at 01/14/23 0700         Review of Systems   Constitutional:  Positive for fever.   Respiratory:  Positive for cough.    Cardiovascular:  Positive for leg  swelling.        Objective:     No intake or output data in the 24 hours ending 01/14/23 0800      Vital Signs (Most Recent):  Temp: 97.6 °F (36.4 °C) (01/13/23 1129)  Pulse: 86 (01/14/23 0434)  Resp: 15 (01/14/23 0434)  BP: (!) 147/81 (01/14/23 0434)  SpO2: (!) 92 % (01/14/23 0304)    Body mass index is 36.58 kg/m².  Weight: 79.4 kg (175 lb) Vital Signs (24h Range):  Temp:  [97.6 °F (36.4 °C)-97.8 °F (36.6 °C)] 97.6 °F (36.4 °C)  Pulse:  [] 86  Resp:  [15-23] 15  SpO2:  [87 %-95 %] 92 %  BP: (138-168)/(69-81) 147/81     Physical Exam  Vitals reviewed.   Constitutional:       Appearance: She is obese.   HENT:      Head: Normocephalic and atraumatic.   Cardiovascular:      Rate and Rhythm: Normal rate and regular rhythm.   Pulmonary:      Effort: Pulmonary effort is normal.      Breath sounds: Decreased air movement present.   Abdominal:      General: Bowel sounds are normal.      Palpations: Abdomen is soft.   Musculoskeletal:      Right lower leg: Edema present.      Left lower leg: Edema present.   Neurological:      General: No focal deficit present.      Mental Status: She is alert.   Psychiatric:         Mood and Affect: Mood normal.         Lines/Drains/Airways       Peripheral Intravenous Line  Duration                  Peripheral IV - Single Lumen 01/13/23 2007 20 G Anterior;Distal;Left Antecubital <1 day                    Significant Labs:    Lab Results   Component Value Date    WBC 8.5 01/14/2023    HGB 11.3 (L) 01/14/2023    HCT 32.2 (L) 01/14/2023    MCV 88.5 01/14/2023     01/14/2023         BMP  Lab Results   Component Value Date     (L) 01/14/2023    K 4.7 01/14/2023    CO2 22 (L) 01/14/2023    BUN 18.3 01/14/2023    CREATININE 0.87 01/14/2023    CALCIUM 8.8 01/14/2023    EGFRNONAA >60 05/15/2022       ABG  No results for input(s): PH, PO2, PCO2, HCO3, BE in the last 168 hours.    Mechanical Ventilation Support:       Significant Imaging:  I have reviewed the pertinent imaging  within the past 24 hours.        Assessment/Plan:     Assessment  Ongoing shortness of breath with dry nonproductive cough  Left pleural effusion  Bilateral lower extremity edema      Plan  Continue supplemental oxygen, wean as tolerated to maintain SpO2 >92%.  MD to review CT imaging of chest  Obtain sputum culture if able  Obtain US to evaluate effusion for possible thoracentesis  Any further recommendations to follow       DOUGIE Velez  Pulmonary Critical Care Medicine  [facility] - Emergency Dept

## 2023-01-15 LAB
ALBUMIN SERPL-MCNC: 2.4 G/DL (ref 3.4–4.8)
ALBUMIN/GLOB SERPL: 0.8 RATIO (ref 1.1–2)
ALP SERPL-CCNC: 75 UNIT/L (ref 40–150)
ALT SERPL-CCNC: 15 UNIT/L (ref 0–55)
AST SERPL-CCNC: 25 UNIT/L (ref 5–34)
BASOPHILS # BLD AUTO: 0.07 X10(3)/MCL (ref 0–0.2)
BASOPHILS NFR BLD AUTO: 0.9 %
BILIRUBIN DIRECT+TOT PNL SERPL-MCNC: 0.6 MG/DL
BUN SERPL-MCNC: 12.9 MG/DL (ref 9.8–20.1)
CALCIUM SERPL-MCNC: 8.3 MG/DL (ref 8.4–10.2)
CHLORIDE SERPL-SCNC: 99 MMOL/L (ref 98–107)
CO2 SERPL-SCNC: 22 MMOL/L (ref 23–31)
CREAT SERPL-MCNC: 0.76 MG/DL (ref 0.55–1.02)
EOSINOPHIL # BLD AUTO: 0.54 X10(3)/MCL (ref 0–0.9)
EOSINOPHIL NFR BLD AUTO: 6.7 %
ERYTHROCYTE [DISTWIDTH] IN BLOOD BY AUTOMATED COUNT: 12.9 % (ref 11.5–17)
GFR SERPLBLD CREATININE-BSD FMLA CKD-EPI: >60 MLS/MIN/1.73/M2
GLOBULIN SER-MCNC: 3.2 GM/DL (ref 2.4–3.5)
GLUCOSE SERPL-MCNC: 81 MG/DL (ref 82–115)
HCT VFR BLD AUTO: 33.6 % (ref 37–47)
HGB BLD-MCNC: 11.4 GM/DL (ref 12–16)
IMM GRANULOCYTES # BLD AUTO: 0.06 X10(3)/MCL (ref 0–0.04)
IMM GRANULOCYTES NFR BLD AUTO: 0.7 %
LYMPHOCYTES # BLD AUTO: 1.54 X10(3)/MCL (ref 0.6–4.6)
LYMPHOCYTES NFR BLD AUTO: 19.1 %
MAGNESIUM SERPL-MCNC: 1.7 MG/DL (ref 1.6–2.6)
MCH RBC QN AUTO: 30.8 PG
MCHC RBC AUTO-ENTMCNC: 33.9 MG/DL (ref 33–36)
MCV RBC AUTO: 90.8 FL (ref 80–94)
MONOCYTES # BLD AUTO: 0.93 X10(3)/MCL (ref 0.1–1.3)
MONOCYTES NFR BLD AUTO: 11.6 %
NEUTROPHILS # BLD AUTO: 4.91 X10(3)/MCL (ref 2.1–9.2)
NEUTROPHILS NFR BLD AUTO: 61 %
NRBC BLD AUTO-RTO: 0 %
PLATELET # BLD AUTO: 155 X10(3)/MCL (ref 130–400)
PMV BLD AUTO: 10.2 FL (ref 7.4–10.4)
POTASSIUM SERPL-SCNC: 4.5 MMOL/L (ref 3.5–5.1)
PROT SERPL-MCNC: 5.6 GM/DL (ref 5.8–7.6)
RBC # BLD AUTO: 3.7 X10(6)/MCL (ref 4.2–5.4)
SODIUM SERPL-SCNC: 129 MMOL/L (ref 136–145)
WBC # SPEC AUTO: 8.1 X10(3)/MCL (ref 4.5–11.5)

## 2023-01-15 PROCEDURE — 80053 COMPREHEN METABOLIC PANEL: CPT | Performed by: INTERNAL MEDICINE

## 2023-01-15 PROCEDURE — 25000003 PHARM REV CODE 250: Performed by: INTERNAL MEDICINE

## 2023-01-15 PROCEDURE — 63600175 PHARM REV CODE 636 W HCPCS: Performed by: HOSPITALIST

## 2023-01-15 PROCEDURE — 85025 COMPLETE CBC W/AUTO DIFF WBC: CPT | Performed by: INTERNAL MEDICINE

## 2023-01-15 PROCEDURE — 83735 ASSAY OF MAGNESIUM: CPT | Performed by: INTERNAL MEDICINE

## 2023-01-15 PROCEDURE — 25000242 PHARM REV CODE 250 ALT 637 W/ HCPCS: Performed by: INTERNAL MEDICINE

## 2023-01-15 PROCEDURE — 36415 COLL VENOUS BLD VENIPUNCTURE: CPT | Performed by: INTERNAL MEDICINE

## 2023-01-15 PROCEDURE — 27000221 HC OXYGEN, UP TO 24 HOURS

## 2023-01-15 PROCEDURE — 11000001 HC ACUTE MED/SURG PRIVATE ROOM

## 2023-01-15 PROCEDURE — 25000003 PHARM REV CODE 250: Performed by: HOSPITALIST

## 2023-01-15 RX ORDER — BENZONATATE 100 MG/1
100 CAPSULE ORAL 3 TIMES DAILY
Status: DISCONTINUED | OUTPATIENT
Start: 2023-01-15 | End: 2023-01-27 | Stop reason: HOSPADM

## 2023-01-15 RX ADMIN — PIPERACILLIN AND TAZOBACTAM 4.5 G: 4; .5 INJECTION, POWDER, FOR SOLUTION INTRAVENOUS; PARENTERAL at 08:01

## 2023-01-15 RX ADMIN — UMECLIDINIUM BROMIDE AND VILANTEROL TRIFENATATE 1 PUFF: 62.5; 25 POWDER RESPIRATORY (INHALATION) at 04:01

## 2023-01-15 RX ADMIN — Medication 1 CAPSULE: at 10:01

## 2023-01-15 RX ADMIN — OXYBUTYNIN CHLORIDE 5 MG: 5 TABLET ORAL at 08:01

## 2023-01-15 RX ADMIN — URSODIOL 500 MG: 250 TABLET ORAL at 08:01

## 2023-01-15 RX ADMIN — AMLODIPINE BESYLATE 10 MG: 5 TABLET ORAL at 10:01

## 2023-01-15 RX ADMIN — UMECLIDINIUM BROMIDE AND VILANTEROL TRIFENATATE 1 PUFF: 62.5; 25 POWDER RESPIRATORY (INHALATION) at 10:01

## 2023-01-15 RX ADMIN — OXYBUTYNIN CHLORIDE 5 MG: 5 TABLET ORAL at 10:01

## 2023-01-15 RX ADMIN — BENZONATATE 100 MG: 100 CAPSULE ORAL at 04:01

## 2023-01-15 RX ADMIN — PIPERACILLIN AND TAZOBACTAM 4.5 G: 4; .5 INJECTION, POWDER, FOR SOLUTION INTRAVENOUS; PARENTERAL at 04:01

## 2023-01-15 RX ADMIN — CHOLESTYRAMINE 4 G: 4 POWDER, FOR SUSPENSION ORAL at 10:01

## 2023-01-15 RX ADMIN — GUAIFENESIN 600 MG: 600 TABLET, EXTENDED RELEASE ORAL at 10:01

## 2023-01-15 RX ADMIN — ESCITALOPRAM OXALATE 5 MG: 5 TABLET, FILM COATED ORAL at 10:01

## 2023-01-15 RX ADMIN — PIPERACILLIN AND TAZOBACTAM 4.5 G: 4; .5 INJECTION, POWDER, FOR SOLUTION INTRAVENOUS; PARENTERAL at 10:01

## 2023-01-15 RX ADMIN — VALSARTAN 160 MG: 80 TABLET, FILM COATED ORAL at 10:01

## 2023-01-15 RX ADMIN — BENZONATATE 100 MG: 100 CAPSULE ORAL at 08:01

## 2023-01-15 RX ADMIN — Medication 1 TABLET: at 10:01

## 2023-01-15 RX ADMIN — CHOLESTYRAMINE 4 G: 4 POWDER, FOR SUSPENSION ORAL at 08:01

## 2023-01-15 RX ADMIN — THERA TABS 1 TABLET: TAB at 10:01

## 2023-01-15 RX ADMIN — URSODIOL 500 MG: 250 TABLET ORAL at 10:01

## 2023-01-15 NOTE — PROGRESS NOTES
[facility]  Hospital Medicine Progress Note        Chief Complaint: Inpatient Follow-up for     HPI:   This is an 82-year-old female medical history as detailed below presented to the ED with complaint of persistent cough and shortness breath and outpatient chest x-ray showing left lower lobe pneumonia/pleural effusion.  Her symptom initially started the last week of December 2022 and was started on doxycycline and prednisone on 12/27/2022, her symptom did not improve and continued to have cough productive of whitish sputum, she again followed up with her PCP and was prescribed levofloxacin on  01/04/2023 for 10 days.  Report low-grade fever 100.2 to 100.4 intermittent throughout the past week, and also noted slight bilateral pedal edema.  Denies chest pain or pleurisy.  Denies weight loss or night sweats.     On arrival to ED she was afebrile, hypertensive and saturating 93% on room air.  Labs showed no leukocytosis or left shift, sodium 125, creatinine 1.04, COVID 19 and flu negative, normal BNP.  CT chest with contrast show left lower lobe complete collapse consolidation, moderate volume left pleural effusion, no enlarged lymph nodes. Referred to hospital medicine service for further evaluation and management.    Pulmonary was consulted.  Empiric antibiotics were continued.       Interval Hx:   No acute events overnight.  Comfortably resting.  Saturating well on 3 L. denies any worsening of shortness of breath.  Reports feeling better.  Complains of intermittent dry cough.  Labs showing normal white count and platelets.  Mild hyponatremia continues.  Infectious workup negative thus far.  Objective/physical exam:  Vitals:    01/14/23 1948 01/14/23 2331 01/15/23 0310 01/15/23 0749   BP: (!) 143/74 (!) 152/74 (!) 147/77 (!) 153/80   BP Location:    Right arm   Patient Position:    Lying   Pulse: 78 85 78 78   Resp: 18 18 18 18   Temp: 98.7 °F (37.1 °C) 98.8 °F (37.1 °C) 98.4 °F (36.9  °C) 98.1 °F (36.7 °C)   TempSrc: Oral Oral Oral Oral   SpO2: (!) 94% (!) 90% 95% (!) 93%   Weight:       Height:         General: In no acute distress, afebrile  Respiratory:  Nonlabored breathing, rhonchi bilaterally, reduced breath sounds left lung base    Cardiovascular: S1, S2, no appreciable murmur  Abdomen: Soft, nontender, BS +  MSK: Warm, no lower extremity edema, no clubbing or cyanosis  Neurologic: Alert and oriented x4, moving all extremities with good strength     Lab Results   Component Value Date     (L) 01/15/2023    K 4.5 01/15/2023    CO2 22 (L) 01/15/2023    BUN 12.9 01/15/2023    CREATININE 0.76 01/15/2023    CALCIUM 8.3 (L) 01/15/2023    EGFRNONAA >60 05/15/2022      Lab Results   Component Value Date    ALT 15 01/15/2023    AST 25 01/15/2023    ALKPHOS 75 01/15/2023    BILITOT 0.6 01/15/2023      Lab Results   Component Value Date    WBC 8.1 01/15/2023    HGB 11.4 (L) 01/15/2023    HCT 33.6 (L) 01/15/2023    MCV 90.8 01/15/2023     01/15/2023           Medications:   amLODIPine  10 mg Oral Daily    calcium-vitamin D3  1 tablet Oral Daily    cholestyramine  1 packet Oral BID    EScitalopram oxalate  5 mg Oral Daily    guaiFENesin  600 mg Oral BID    Lactobacillus acidophilus  1 capsule Oral Daily    multivitamin  1 tablet Oral Daily    oxybutynin  5 mg Oral BID    piperacillin-tazobactam (ZOSYN) IVPB  4.5 g Intravenous Q8H    umeclidinium-vilanteroL  1 puff Inhalation Daily    ursodioL  500 mg Oral BID    valsartan  160 mg Oral Daily      albuterol-ipratropium, aluminum-magnesium hydroxide-simethicone, amitriptyline, dextromethorphan-guaiFENesin  mg/5 ml, melatonin, ondansetron, polyethylene glycol, prochlorperazine, senna-docusate 8.6-50 mg, simethicone, sodium chloride 0.9%     Assessment/Plan:    Acute hypoxemic respiratory failure-improving   Left-sided pleural effusion  Hyponatremia-likely thiazide induced    HX: PBC on ursodiol and cholestyramine, HTN, DDD/osteoarthritis  on spinal stimulator, migraine headache, short period (6 years) of smoking in her 30s    Plan:   -continue empiric antibiotics.  Infectious workup negative thus far. encourage IS use.  Pulmonary following.  -dc fluids.  Monitor sodium.  Hold thiazide.  Check TSH for tomorrow.  We will consider dose of Lasix or fluid restriction if hyponatremia continues.    -other home medications reviewed    Tristan Hodgson MD

## 2023-01-15 NOTE — PROGRESS NOTES
[facility] - Emergency Dept  Pulmonary Critical Care Note    Patient Name: Purnima Higgins  MRN: [id number]  Admission Date: 2023  Hospital Length of Stay: 2 days  Code Status: Full Code  Attending Provider: Yefri Herrera MD  Primary Care Provider: Jos Briseno MD     Subjective:     HPI:   This is an 82-year-old female with a history of HTN, HLD, back pain with implanted stimulator who has been experiencing a cough x 3 weeks. She failed outpatient treatment by PCP having received doxycycline, prednisone and Levaquin. She was then referred to the ED for further evaluation on 23. She also reported new onset welling in her lower extremities in the past few days. CXR revealed a small left pleural effusion, unchanged from previous imaging. CT of chest redemonstrated left pleural effusion. She was admitted to hospital medicine and pulmonary was consulted for evaluation of pleural effusion. Patient is requiring supplemental oxygen at 2 L nasal cannula. Reports ongoing dry cough, no longer productive.     Hospital Course/Significant events:  N/a    24 Hour Interval History:  Evaluated effusion with US, small left effusion. It was decided to not tap at this time and will observe patients response to antibiotics and the re-evaluate. MRSA PCR negative. Remains on zosyn Day # 2.    Past Medical History:   Diagnosis Date    Arthritis     Back pain     scs implant- Dr ritter    Dyslipidemia     Hypertension     Liver disease     Spinal cord disorder        Past Surgical History:   Procedure Laterality Date    APPENDECTOMY      BACK SURGERY       SECTION      CHOLECYSTECTOMY      LAPAROSCOPIC REPAIR OF INCISIONAL HERNIA N/A 6/15/2022    Procedure: REPAIR, HERNIA, INCISIONAL, LAPAROSCOPIC;  Surgeon: Nayan Gonzalez MD;  Location: [facility];  Service: General;  Laterality: N/A;    NECK SURGERY      SHOULDER SURGERY Bilateral     TOTAL KNEE ARTHROPLASTY         Social History     Socioeconomic  History    Marital status:    Tobacco Use    Smoking status: Never    Smokeless tobacco: Never   Substance and Sexual Activity    Alcohol use: Yes     Comment: 1 beer per weekend/  4 shots of whiskey per weekend    Drug use: Never    Sexual activity: Not Currently           Current Outpatient Medications   Medication Instructions    albuterol (VENTOLIN HFA) 90 mcg/actuation inhaler 2 puffs, Inhalation, Every 4 hours PRN, Disp 1 inhaler    amitriptyline (ELAVIL) 25 MG tablet Take 1-2 po qHS PRN headache    amLODIPine (NORVASC) 10 mg, Oral, Daily    aspirin (ECOTRIN) 81 mg, Oral    calcium-vitamin D3 (OS-BIBIANA 500 + D3) 500 mg-5 mcg (200 unit) per tablet 1 tablet, Oral, Daily    cholestyramine (QUESTRAN) 4 gram packet TAKE 1 PACKET TWICE A DAY    coenzyme Q10 100 mg, Oral    EScitalopram oxalate (LEXAPRO) 5 mg, Oral, Daily    guaiFENesin-codeine 100-10 mg/5 ml (TUSSI-ORGANIDIN NR)  mg/5 mL syrup 5-10mL po q4h prn cough    ibandronate (BONIVA) 150 mg, Oral, Every 30 days    L.acidophilus-Bifido.longum 1 billion cell CpDR 1 capsule, Oral, Daily    levoFLOXacin (LEVAQUIN) 750 mg, Oral, Daily    multivitamin (THERAGRAN) per tablet 1 tablet, Oral, Daily    oxybutynin (DITROPAN-XL) 10 mg, Oral, Daily    promethazine-dextromethorphan (PROMETHAZINE-DM) 6.25-15 mg/5 mL Syrp 5 mLs, Oral, Every 6 hours PRN    umeclidinium-vilanteroL (ANORO ELLIPTA) 62.5-25 mcg/actuation DsDv 1 puff, Inhalation, Daily, Controller    ursodioL (ACTIGALL) 250 mg Tab Oral    valsartan (DIOVAN) 160 mg, Oral, Daily       Current Inpatient Medications   amLODIPine  10 mg Oral Daily    benzonatate  100 mg Oral TID    calcium-vitamin D3  1 tablet Oral Daily    cholestyramine  1 packet Oral BID    EScitalopram oxalate  5 mg Oral Daily    guaiFENesin  600 mg Oral BID    Lactobacillus acidophilus  1 capsule Oral Daily    multivitamin  1 tablet Oral Daily    oxybutynin  5 mg Oral BID    piperacillin-tazobactam (ZOSYN) IVPB  4.5 g Intravenous Q8H     umeclidinium-vilanteroL  1 puff Inhalation Daily    ursodioL  500 mg Oral BID    valsartan  160 mg Oral Daily       Current Intravenous Infusions   sodium chloride 0.9% 100 mL/hr at 01/14/23 0700         Review of Systems   Constitutional:  Positive for fever.   Respiratory:  Positive for cough.    Cardiovascular:  Positive for leg swelling.        Objective:       Intake/Output Summary (Last 24 hours) at 1/15/2023 1021  Last data filed at 1/14/2023 1547  Gross per 24 hour   Intake --   Output 400 ml   Net -400 ml         Vital Signs (Most Recent):  Temp: 98.1 °F (36.7 °C) (01/15/23 0749)  Pulse: 78 (01/15/23 0749)  Resp: 18 (01/15/23 0749)  BP: (!) 153/80 (01/15/23 0749)  SpO2: (!) 93 % (01/15/23 0749)    Body mass index is 36.57 kg/m².  Weight: 79.4 kg (175 lb) Vital Signs (24h Range):  Temp:  [98.1 °F (36.7 °C)-99.1 °F (37.3 °C)] 98.1 °F (36.7 °C)  Pulse:  [69-96] 78  Resp:  [18-23] 18  SpO2:  [90 %-95 %] 93 %  BP: (125-153)/(70-80) 153/80     Physical Exam  Vitals reviewed.   Constitutional:       Appearance: She is obese.   HENT:      Head: Normocephalic and atraumatic.   Cardiovascular:      Rate and Rhythm: Normal rate and regular rhythm.   Pulmonary:      Effort: Pulmonary effort is normal.      Breath sounds: Decreased air movement present.   Abdominal:      General: Bowel sounds are normal.      Palpations: Abdomen is soft.   Musculoskeletal:      Right lower leg: Edema present.      Left lower leg: Edema present.   Neurological:      General: No focal deficit present.      Mental Status: She is alert.   Psychiatric:         Mood and Affect: Mood normal.         Lines/Drains/Airways       Peripheral Intravenous Line  Duration                  Peripheral IV - Single Lumen 01/13/23 2007 20 G Anterior;Distal;Left Antecubital 1 day                    Significant Labs:    Lab Results   Component Value Date    WBC 8.1 01/15/2023    HGB 11.4 (L) 01/15/2023    HCT 33.6 (L) 01/15/2023    MCV 90.8 01/15/2023    PLT  155 01/15/2023         BMP  Lab Results   Component Value Date     (L) 01/15/2023    K 4.5 01/15/2023    CO2 22 (L) 01/15/2023    BUN 12.9 01/15/2023    CREATININE 0.76 01/15/2023    CALCIUM 8.3 (L) 01/15/2023    EGFRNONAA >60 05/15/2022       ABG  No results for input(s): PH, PO2, PCO2, HCO3, BE in the last 168 hours.    Mechanical Ventilation Support:       Significant Imaging:  I have reviewed the pertinent imaging within the past 24 hours.        Assessment/Plan:     Assessment  Ongoing shortness of breath with dry nonproductive cough  Left pleural effusion  Bilateral lower extremity edema      Plan  - Continue supplemental oxygen, wean as tolerated to maintain SpO2 >92%.  - Respiratory culture from 1/14 pending  - Will order CXR for tomorrow morning to re-evaluate effusion  - Continue zosyn Day # 2.       DOUGIE Velez  Pulmonary Critical Care Medicine  [facility] - Emergency Dept

## 2023-01-16 LAB
ALBUMIN SERPL-MCNC: 2.2 G/DL (ref 3.4–4.8)
ALBUMIN/GLOB SERPL: 0.6 RATIO (ref 1.1–2)
ALP SERPL-CCNC: 71 UNIT/L (ref 40–150)
ALT SERPL-CCNC: 15 UNIT/L (ref 0–55)
AST SERPL-CCNC: 22 UNIT/L (ref 5–34)
BACTERIA SPEC CULT: NORMAL
BASOPHILS # BLD AUTO: 0.08 X10(3)/MCL (ref 0–0.2)
BASOPHILS NFR BLD AUTO: 1 %
BILIRUBIN DIRECT+TOT PNL SERPL-MCNC: 0.6 MG/DL
BUN SERPL-MCNC: 7.5 MG/DL (ref 9.8–20.1)
CALCIUM SERPL-MCNC: 8.3 MG/DL (ref 8.4–10.2)
CHLORIDE SERPL-SCNC: 97 MMOL/L (ref 98–107)
CLARITY BODY FLUID (OHS): NORMAL
CO2 SERPL-SCNC: 22 MMOL/L (ref 23–31)
COLOR BODY FLUID (OHS): NORMAL
CREAT SERPL-MCNC: 0.7 MG/DL (ref 0.55–1.02)
EOSINOPHIL # BLD AUTO: 0.77 X10(3)/MCL (ref 0–0.9)
EOSINOPHIL MANUAL BF (OHS): 2 %
EOSINOPHIL NFR BLD AUTO: 9.2 %
ERYTHROCYTE [DISTWIDTH] IN BLOOD BY AUTOMATED COUNT: 12.7 % (ref 11.5–17)
GFR SERPLBLD CREATININE-BSD FMLA CKD-EPI: >60 MLS/MIN/1.73/M2
GLOBULIN SER-MCNC: 3.6 GM/DL (ref 2.4–3.5)
GLUCOSE FLD-MCNC: 101 MG/DL
GLUCOSE SERPL-MCNC: 88 MG/DL (ref 82–115)
GRAM STN SPEC: NORMAL
GRAM STN SPEC: NORMAL
HCT VFR BLD AUTO: 31.7 % (ref 37–47)
HGB BLD-MCNC: 10.8 GM/DL (ref 12–16)
IMM GRANULOCYTES # BLD AUTO: 0.05 X10(3)/MCL (ref 0–0.04)
IMM GRANULOCYTES NFR BLD AUTO: 0.6 %
KOH PREP SPEC: NORMAL
LDH FLD-CCNC: 109 U/L
LYMPHOCYTE MANUAL BF (OHS): 44 %
LYMPHOCYTES # BLD AUTO: 1.67 X10(3)/MCL (ref 0.6–4.6)
LYMPHOCYTES NFR BLD AUTO: 20 %
MAGNESIUM SERPL-MCNC: 1.6 MG/DL (ref 1.6–2.6)
MCH RBC QN AUTO: 30.7 PG
MCHC RBC AUTO-ENTMCNC: 34.1 MG/DL (ref 33–36)
MCV RBC AUTO: 90.1 FL (ref 80–94)
MONOCYTE MANUAL BF (OHS): 1 %
MONOCYTES # BLD AUTO: 1.04 X10(3)/MCL (ref 0.1–1.3)
MONOCYTES NFR BLD AUTO: 12.4 %
NEUTROPHILS # BLD AUTO: 4.75 X10(3)/MCL (ref 2.1–9.2)
NEUTROPHILS MAN BF (OHS): 53 %
NEUTROPHILS NFR BLD AUTO: 56.8 %
NRBC BLD AUTO-RTO: 0 %
PLATELET # BLD AUTO: 144 X10(3)/MCL (ref 130–400)
PMV BLD AUTO: 9.8 FL (ref 7.4–10.4)
POTASSIUM SERPL-SCNC: 4.1 MMOL/L (ref 3.5–5.1)
PROT FLD-MCNC: 2.8 GM/DL
PROT SERPL-MCNC: 5.8 GM/DL (ref 5.8–7.6)
RBC # BLD AUTO: 3.52 X10(6)/MCL (ref 4.2–5.4)
SODIUM SERPL-SCNC: 127 MMOL/L (ref 136–145)
TSH SERPL-ACNC: 3.38 UIU/ML (ref 0.35–4.94)
WBC # FLD AUTO: 677 /UL
WBC # SPEC AUTO: 8.4 X10(3)/MCL (ref 4.5–11.5)

## 2023-01-16 PROCEDURE — 97166 OT EVAL MOD COMPLEX 45 MIN: CPT

## 2023-01-16 PROCEDURE — 80053 COMPREHEN METABOLIC PANEL: CPT | Performed by: INTERNAL MEDICINE

## 2023-01-16 PROCEDURE — 11000001 HC ACUTE MED/SURG PRIVATE ROOM

## 2023-01-16 PROCEDURE — 88305 TISSUE EXAM BY PATHOLOGIST: CPT | Performed by: INTERNAL MEDICINE

## 2023-01-16 PROCEDURE — 87206 SMEAR FLUORESCENT/ACID STAI: CPT | Performed by: INTERNAL MEDICINE

## 2023-01-16 PROCEDURE — 36415 COLL VENOUS BLD VENIPUNCTURE: CPT | Performed by: INTERNAL MEDICINE

## 2023-01-16 PROCEDURE — 85025 COMPLETE CBC W/AUTO DIFF WBC: CPT | Performed by: INTERNAL MEDICINE

## 2023-01-16 PROCEDURE — 84157 ASSAY OF PROTEIN OTHER: CPT | Performed by: INTERNAL MEDICINE

## 2023-01-16 PROCEDURE — 25000003 PHARM REV CODE 250: Performed by: INTERNAL MEDICINE

## 2023-01-16 PROCEDURE — 87220 TISSUE EXAM FOR FUNGI: CPT | Performed by: INTERNAL MEDICINE

## 2023-01-16 PROCEDURE — 82945 GLUCOSE OTHER FLUID: CPT | Performed by: INTERNAL MEDICINE

## 2023-01-16 PROCEDURE — 25000242 PHARM REV CODE 250 ALT 637 W/ HCPCS: Performed by: INTERNAL MEDICINE

## 2023-01-16 PROCEDURE — 83615 LACTATE (LD) (LDH) ENZYME: CPT | Performed by: INTERNAL MEDICINE

## 2023-01-16 PROCEDURE — 84443 ASSAY THYROID STIM HORMONE: CPT | Performed by: INTERNAL MEDICINE

## 2023-01-16 PROCEDURE — 89051 BODY FLUID CELL COUNT: CPT | Performed by: INTERNAL MEDICINE

## 2023-01-16 PROCEDURE — 86480 TB TEST CELL IMMUN MEASURE: CPT | Performed by: INTERNAL MEDICINE

## 2023-01-16 PROCEDURE — 87116 MYCOBACTERIA CULTURE: CPT | Performed by: INTERNAL MEDICINE

## 2023-01-16 PROCEDURE — 27000221 HC OXYGEN, UP TO 24 HOURS

## 2023-01-16 PROCEDURE — 87102 FUNGUS ISOLATION CULTURE: CPT | Performed by: INTERNAL MEDICINE

## 2023-01-16 PROCEDURE — 25000003 PHARM REV CODE 250: Performed by: HOSPITALIST

## 2023-01-16 PROCEDURE — 83735 ASSAY OF MAGNESIUM: CPT | Performed by: INTERNAL MEDICINE

## 2023-01-16 PROCEDURE — 82947 ASSAY GLUCOSE BLOOD QUANT: CPT | Performed by: INTERNAL MEDICINE

## 2023-01-16 PROCEDURE — 63600175 PHARM REV CODE 636 W HCPCS: Performed by: HOSPITALIST

## 2023-01-16 RX ADMIN — OXYBUTYNIN CHLORIDE 5 MG: 5 TABLET ORAL at 09:01

## 2023-01-16 RX ADMIN — Medication 1 TABLET: at 09:01

## 2023-01-16 RX ADMIN — BENZONATATE 100 MG: 100 CAPSULE ORAL at 02:01

## 2023-01-16 RX ADMIN — THERA TABS 1 TABLET: TAB at 09:01

## 2023-01-16 RX ADMIN — VALSARTAN 160 MG: 80 TABLET, FILM COATED ORAL at 09:01

## 2023-01-16 RX ADMIN — CHOLESTYRAMINE 4 G: 4 POWDER, FOR SUSPENSION ORAL at 09:01

## 2023-01-16 RX ADMIN — UMECLIDINIUM BROMIDE AND VILANTEROL TRIFENATATE 1 PUFF: 62.5; 25 POWDER RESPIRATORY (INHALATION) at 05:01

## 2023-01-16 RX ADMIN — Medication 1 CAPSULE: at 09:01

## 2023-01-16 RX ADMIN — CHOLESTYRAMINE 4 G: 4 POWDER, FOR SUSPENSION ORAL at 08:01

## 2023-01-16 RX ADMIN — PIPERACILLIN AND TAZOBACTAM 4.5 G: 4; .5 INJECTION, POWDER, FOR SOLUTION INTRAVENOUS; PARENTERAL at 10:01

## 2023-01-16 RX ADMIN — ESCITALOPRAM OXALATE 5 MG: 5 TABLET, FILM COATED ORAL at 09:01

## 2023-01-16 RX ADMIN — URSODIOL 500 MG: 250 TABLET ORAL at 08:01

## 2023-01-16 RX ADMIN — AMLODIPINE BESYLATE 10 MG: 5 TABLET ORAL at 09:01

## 2023-01-16 RX ADMIN — BENZONATATE 100 MG: 100 CAPSULE ORAL at 09:01

## 2023-01-16 RX ADMIN — OXYBUTYNIN CHLORIDE 5 MG: 5 TABLET ORAL at 08:01

## 2023-01-16 RX ADMIN — BENZONATATE 100 MG: 100 CAPSULE ORAL at 08:01

## 2023-01-16 RX ADMIN — URSODIOL 500 MG: 250 TABLET ORAL at 09:01

## 2023-01-16 RX ADMIN — PIPERACILLIN AND TAZOBACTAM 4.5 G: 4; .5 INJECTION, POWDER, FOR SOLUTION INTRAVENOUS; PARENTERAL at 02:01

## 2023-01-16 RX ADMIN — PIPERACILLIN AND TAZOBACTAM 4.5 G: 4; .5 INJECTION, POWDER, FOR SOLUTION INTRAVENOUS; PARENTERAL at 08:01

## 2023-01-16 NOTE — PROGRESS NOTES
[facility] - 5th Floor Med Surg  Pulmonary/Critical Care - Medicine  Progress Note    Patient Name: Purnima Higgins  MRN: [id number]  Admission Date: 1/13/2023  Hospital Length of Stay: 3 days  Code Status: Full Code  Attending Provider: Yefri Herrera MD  Primary Care Provider: Jos Briseno MD     Subjective:     HPI:   This is an 82-year-old female with a history of HTN, HLD, back pain with implanted stimulator who has been experiencing a cough x 3 weeks. She failed outpatient treatment by PCP having received doxycycline, prednisone and Levaquin. She was then referred to the ED for further evaluation on 1/13/23. She also reported new onset swelling in her lower extremities data to presentation. CXR in ER revealed a small left pleural effusion, unchanged from previous imaging. CT of chest redemonstrated left pleural effusion. She was admitted to hospital medicine and pulmonary was consulted for evaluation of pleural effusion.  Dr. Mason performed ultrasound evaluation of the left chest in the ED on 01/13/2023, revealing a reportedly small left pleural effusion, felt that this was too small for safe sampling.  Decision was made to continue antibiotics with close monitoring of the pleural effusion.    Interval History/Significant Events:   I have extensively reviewed this patient's hospital records thus far, x-ray and CT imaging, as is being seen by me for the 1st time today.  She is afebrile since admission.  Respiratory cultures normal upper respiratory prasanth.  Respiratory PCR panel negative, SARS-COVID-2 PCR negative, influenza A/B negative PCR.  He denies chest pain, shoulder pain, or back pain.  She states that her breathing has significantly improved since admission.  She states that her lower extremity swelling is also markedly improved since admission.    Scheduled Medications:   amLODIPine  10 mg Oral Daily    benzonatate  100 mg Oral TID    calcium-vitamin D3  1 tablet Oral Daily     cholestyramine  1 packet Oral BID    EScitalopram oxalate  5 mg Oral Daily    guaiFENesin  600 mg Oral BID    Lactobacillus acidophilus  1 capsule Oral Daily    multivitamin  1 tablet Oral Daily    oxybutynin  5 mg Oral BID    piperacillin-tazobactam (ZOSYN) IVPB  4.5 g Intravenous Q8H    umeclidinium-vilanteroL  1 puff Inhalation Daily    ursodioL  500 mg Oral BID    valsartan  160 mg Oral Daily     PRN Medications:  albuterol-ipratropium, aluminum-magnesium hydroxide-simethicone, amitriptyline, dextromethorphan-guaiFENesin  mg/5 ml, melatonin, ondansetron, polyethylene glycol, prochlorperazine, senna-docusate 8.6-50 mg, simethicone, sodium chloride 0.9%  Continuous Infusions:      Past Medical History:   Diagnosis Date    Arthritis     Back pain     scs implant- Dr ritter    Dyslipidemia     Hypertension     Liver disease     Spinal cord disorder        Past Surgical History:   Procedure Laterality Date    APPENDECTOMY      BACK SURGERY       SECTION      CHOLECYSTECTOMY      LAPAROSCOPIC REPAIR OF INCISIONAL HERNIA N/A 6/15/2022    Procedure: REPAIR, HERNIA, INCISIONAL, LAPAROSCOPIC;  Surgeon: Nayan Gonzalez MD;  Location: [facility];  Service: General;  Laterality: N/A;    NECK SURGERY      SHOULDER SURGERY Bilateral     TOTAL KNEE ARTHROPLASTY         Objective:     Input/output:    Intake/Output Summary (Last 24 hours) at 202336  Last data filed at 1/15/2023 2100  Gross per 24 hour   Intake 1780 ml   Output 950 ml   Net 830 ml       Vital Signs (Most Recent):  Temp: 98.6 °F (37 °C) (23)  Pulse: 75 (23)  Resp: 18 (23)  BP: (!) 156/77 (23)  SpO2: 95 % (23)    Body mass index is 36.57 kg/m².  Weight: 79.4 kg (175 lb) Vital Signs (24h Range):  Temp:  [98.1 °F (36.7 °C)-98.6 °F (37 °C)] 98.6 °F (37 °C)  Pulse:  [64-94] 75  Resp:  [18] 18  SpO2:  [90 %-96 %] 95 %  BP: (138-156)/(73-80) 156/77     Physical Exam  Constitutional:        Appearance: She is obese.   HENT:      Mouth/Throat:      Mouth: Mucous membranes are moist.      Pharynx: Oropharynx is clear.   Eyes:      Conjunctiva/sclera: Conjunctivae normal.      Pupils: Pupils are equal, round, and reactive to light.   Cardiovascular:      Rate and Rhythm: Normal rate and regular rhythm.   Pulmonary:      Comments: Decreased breath sounds left posterior base with E to A changes  I performed ultrasound evaluation of the left hemithorax revealing a moderate sized left pleural effusion with compensatory atelectasis of the left lower lobe.  Abdominal:      General: Bowel sounds are normal.      Palpations: Abdomen is soft.   Musculoskeletal:      Right lower leg: No edema.      Left lower leg: No edema.   Lymphadenopathy:      Cervical: No cervical adenopathy.   Skin:     General: Skin is warm and dry.   Neurological:      Mental Status: She is alert and oriented to person, place, and time.       Lines/Drains/Airways       Peripheral Intravenous Line  Duration                  Peripheral IV - Single Lumen 01/13/23 2007 20 G Anterior;Distal;Left Antecubital 2 days                  Significant Labs:    Lab Results   Component Value Date    WBC 8.4 01/16/2023    HGB 10.8 (L) 01/16/2023    HCT 31.7 (L) 01/16/2023    MCV 90.1 01/16/2023     01/16/2023         Recent Labs   Lab 01/16/23  0307   *   K 4.1   CO2 22*   BUN 7.5*   CREATININE 0.70   CALCIUM 8.3*   MG 1.60   AST 22   ALT 15   ALKPHOS 71   ALBUMIN 2.2*       Imaging:  CT chest (01/13/2023, my reading of images):  Study is performed with IV contrast.  There is calcification of the aorta.  There is no obvious mediastinal, hilar, or subcarinal adenopathy.  There is a left basilar pleural effusion with compensatory atelectasis of the left lower lobe.  No significant right pleural fluid appreciated.  Pleural effusion was noted not to be present on comparison to CT of abdomen performed 05/15/2022.  Chest x-ray 01/04/2023 showed some  silhouetting of the left base but is underpenetrated and difficult to interpret adequately.  Chest x-ray 12/27/2022 is also underpenetrated with no obvious pleural effusion left however.    Assessment/Plan:     Left pleural effusion with no obvious evidence of loculation of undetermined etiology.  This appears likely newly formed over chest x-ray 12/27/2022.  Ultrasound examination this a.m. reveals this to be moderate-sized.  Would be suspicious of possible new onset CHF etiology at this point in time.  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  Transthoracic echocardiogram to determine LVSF  Further to follow after above       Chhaya Jefferson MD, FCCP  Pulmonary/Critical Care  [facility] - 5th Floor Med Surg

## 2023-01-16 NOTE — PLAN OF CARE
Problem: Occupational Therapy  Goal: Occupational Therapy Goal  Description: Goals to be met by: 1/30     Patient will increase functional independence with ADLs by performing:    LE Dressing with Modified [place].  Grooming while standing at sink with Modified [place].  Toileting from toilet with Modified [place] for hygiene and clothing management.   Bathing from  shower chair/bench with Modified [place].  Toilet transfer to toilet with Modified [place].    Outcome: Ongoing, Progressing

## 2023-01-16 NOTE — PROGRESS NOTES
[facility]  Hospital Medicine Progress Note        Chief Complaint: Inpatient Follow-up for shortness of breath    HPI:   This is an 82-year-old female medical history as detailed below presented to the ED with complaint of persistent cough and shortness breath and outpatient chest x-ray showing left lower lobe pneumonia/pleural effusion.  Her symptom initially started the last week of December 2022 and was started on doxycycline and prednisone on 12/27/2022, her symptom did not improve and continued to have cough productive of whitish sputum, she again followed up with her PCP and was prescribed levofloxacin on  01/04/2023 for 10 days.  Report low-grade fever 100.2 to 100.4 intermittent throughout the past week, and also noted slight bilateral pedal edema.  Denies chest pain or pleurisy.  Denies weight loss or night sweats.     On arrival to ED she was afebrile, hypertensive and saturating 93% on room air.  Labs showed no leukocytosis or left shift, sodium 125, creatinine 1.04, COVID 19 and flu negative, normal BNP.  CT chest with contrast show left lower lobe complete collapse consolidation, moderate volume left pleural effusion, no enlarged lymph nodes. Referred to hospital medicine service for further evaluation and management.    Pulmonary was consulted.  Empiric antibiotics were continued.    Interval Hx:   Afebrile overnight.  Underwent thoracentesis today.  Reports feeling much better since thoracentesis.  Alert, oriented, comfortably resting in the bed.  Saturating well on 3 L.   at bedside.  Cough improving.  Compliant with incentive spirometer.  Hyponatremia continues.  Respiratory cultures grew normal prasanth  Objective/physical exam:  Vitals:    01/15/23 1911 01/16/23 0030 01/16/23 0513 01/16/23 0723   BP: (!) 146/74 (!) 153/77 138/76 (!) 156/77   BP Location:       Patient Position:       Pulse: 94 64 73 75   Resp: 18 18 18 18   Temp: 98.4 °F (36.9 °C) 98.2 °F (36.8  °C) 98.1 °F (36.7 °C) 98.6 °F (37 °C)   TempSrc: Oral   Oral   SpO2: (!) 90% (!) 94% 96% 95%   Weight:       Height:         General: In no acute distress, afebrile  Respiratory: Clear to auscultation bilaterally  Cardiovascular: S1, S2, no appreciable murmur  Abdomen: Soft, nontender, BS +  MSK: Warm, no lower extremity edema, no clubbing or cyanosis  Neurologic: Alert and oriented x4, moving all extremities with good strength     Lab Results   Component Value Date     (L) 01/16/2023    K 4.1 01/16/2023    CO2 22 (L) 01/16/2023    BUN 7.5 (L) 01/16/2023    CREATININE 0.70 01/16/2023    CALCIUM 8.3 (L) 01/16/2023    EGFRNONAA >60 05/15/2022      Lab Results   Component Value Date    ALT 15 01/16/2023    AST 22 01/16/2023    ALKPHOS 71 01/16/2023    BILITOT 0.6 01/16/2023      Lab Results   Component Value Date    WBC 8.4 01/16/2023    HGB 10.8 (L) 01/16/2023    HCT 31.7 (L) 01/16/2023    MCV 90.1 01/16/2023     01/16/2023           Medications:   amLODIPine  10 mg Oral Daily    benzonatate  100 mg Oral TID    calcium-vitamin D3  1 tablet Oral Daily    cholestyramine  1 packet Oral BID    EScitalopram oxalate  5 mg Oral Daily    guaiFENesin  600 mg Oral BID    Lactobacillus acidophilus  1 capsule Oral Daily    multivitamin  1 tablet Oral Daily    oxybutynin  5 mg Oral BID    piperacillin-tazobactam (ZOSYN) IVPB  4.5 g Intravenous Q8H    umeclidinium-vilanteroL  1 puff Inhalation Daily    ursodioL  500 mg Oral BID    valsartan  160 mg Oral Daily      albuterol-ipratropium, aluminum-magnesium hydroxide-simethicone, amitriptyline, dextromethorphan-guaiFENesin  mg/5 ml, melatonin, ondansetron, polyethylene glycol, prochlorperazine, senna-docusate 8.6-50 mg, simethicone, sodium chloride 0.9%     Assessment/Plan:    Acute hypoxemic respiratory failure-improving   Left-sided pleural effusion s/p Thoracentesis 1/16/23  Hyponatremia-likely thiazide induced     HX: PBC on ursodiol and cholestyramine, HTN,  DDD/osteoarthritis on spinal stimulator, migraine headache, short period (6 years) of smoking in her 30s     Plan:   -thoracentesis today.  Appreciate Pulmonary assistance.  Follow pleural fluid analysis.  -continue empiric antibiotics.  Infectious workup negative thus far. encourage IS use.  Wean off oxygen as tolerated.  -continue monitor sodium.  Hold thiazides.  TSH within normal limits  -other home medications reviewed    PT yesenia Hodgson MD

## 2023-01-16 NOTE — PT/OT/SLP EVAL
Occupational Therapy   Evaluation    Name: Purnima Higgins  MRN: [id number]  Admitting Diagnosis: Pleural effusion on left s/p thoracentesis, LLL pneumonia, peripheral edema   Recent Surgery: * No surgery found *      Recommendations:     Discharge Recommendations: home with home health (Home c responsible caregiver- pending progres c therapy)  Discharge Equipment Recommendations:  hip kit  Barriers to discharge:  Other (Comment) (Acuity of illness)    Assessment:     Purnima Higgins is a 82 y.o. female with a medical diagnosis of Pleural effusion on left s/p thoracentesis, LLL pneumonia, peripheral edema. Performance deficits affecting function: weakness, impaired functional mobility, impaired endurance, impaired balance, impaired self care skills.      Rehab Prognosis: Good; patient would benefit from acute skilled OT services to address these deficits and reach maximum level of function.       Plan:     Patient to be seen 3 x/week, 5 x/week to address the above listed problems via self-care/home management, therapeutic activities, therapeutic exercises  Plan of Care Expires: 01/30/23  Plan of Care Reviewed with: patient, spouse    Subjective     Chief Complaint: None   Patient/Family Comments/goals: To go home     Occupational Profile:  Living Environment: Pt lives in a [facility] c her spouse. Reported she has a walk in shower c a shower chair.   Previous level of function: IND c ADLs and mobility without AD  Equipment Used at Home: none (Has access to shower chair, RW, cane, w/c)  Assistance upon Discharge: Pt's spouse will be able to assist at d/c.     Pain/Comfort:  Pain Rating 1: 0/10    Patients cultural, spiritual, Mormonism conflicts given the current situation: no    Objective:     Communicated with: RN prior to session.  Patient found supine with peripheral IV, oxygen upon OT entry to room.    General Precautions: Standard, fall  Orthopedic Precautions: N/A  Braces: N/A  Respiratory Status: Nasal cannula, flow  3 L/min    Occupational Performance:    Bed Mobility:    Patient completed Scooting/Bridging with stand by assistance  Patient completed Supine to Sit with minimum assistance    Functional Mobility/Transfers:  Patient completed Sit <> Stand Transfer with contact guard assistance  with  rolling walker   Patient completed Toilet Transfer Step Transfer technique with contact guard assistance with  rolling walker  Functional Mobility: Pt ambulated to toilet using RW c CGA.     Activities of Daily Living:  Lower Body Dressing: maximal assistance    Toileting: minimum assistance for pericare following BM    Cognitive/Visual Perceptual:  Cognitive/Psychosocial Skills:     -       Mood/Affect/Coping skills/emotional control: Appropriate to situation and Cooperative    Physical Exam:  Upper Extremity Strength:    -       Right Upper Extremity: WFL  -       Left Upper Extremity: WFL      Treatment & Education:  Pt educated on OT POC- verbalized understanding.     Patient left up in chair with all lines intact and call button in reach    GOALS:   Multidisciplinary Problems       Occupational Therapy Goals          Problem: Occupational Therapy    Goal Priority Disciplines Outcome Interventions   Occupational Therapy Goal     OT, PT/OT Ongoing, Progressing    Description: Goals to be met by: 1/30     Patient will increase functional independence with ADLs by performing:    LE Dressing with Modified [place].  Grooming while standing at sink with Modified [place].  Toileting from toilet with Modified [place] for hygiene and clothing management.   Bathing from  shower chair/bench with Modified [place].  Toilet transfer to toilet with Modified [place].                         History:     Past Medical History:   Diagnosis Date    Arthritis     Back pain     scs implant- Dr ritter    Dyslipidemia     Hypertension     Liver disease     Spinal cord disorder          Past Surgical History:   Procedure  Laterality Date    APPENDECTOMY      BACK SURGERY       SECTION      CHOLECYSTECTOMY      LAPAROSCOPIC REPAIR OF INCISIONAL HERNIA N/A 6/15/2022    Procedure: REPAIR, HERNIA, INCISIONAL, LAPAROSCOPIC;  Surgeon: Nayan Gonzalez MD;  Location: [facility];  Service: General;  Laterality: N/A;    NECK SURGERY      SHOULDER SURGERY Bilateral     TOTAL KNEE ARTHROPLASTY         Time Tracking:     OT Date of Treatment: 23  OT Start Time: 1113  OT Stop Time: 1128  OT Total Time (min): 15 min    Billable Minutes:Evaluation Moderate complexity     2023

## 2023-01-16 NOTE — OP NOTE
[facility]   Th[facility]  Procedure Note    SUMMARY     Date of Procedure:  1/16/2023    Procedure:  Thoracentesis left hemithorax using ultrasound guidance    Performed by: Chhaya Jefferson MD, [facility]      Pre-Operative Diagnosis:  Moderate-size left pleural effusion    Post-Operative Diagnosis:  Moderate-size left pleural effusion    Anesthesia: Local, 2% lidocaine without epinephrine      Description of the Findings of the Procedure:   Proper informed consent was obtained, including a detailed discussion of the potential risks and benefits of the procedure.  Patient was placed in a sitting position, and ultrasound used to localize a moderate-sized left pleural effusion.  Skin was then marked, and was then prepped using chlorhexidine which was allowed to dry.  Sterile drape was then applied.  Xylocaine 2% without epinephrine, 3 ml, was used to anesthetize skin.  A 22 gauge needle was then inserted over rib, into pleural space with return of serosanguineous thin fluid.  A thoracentesis needle/catheter was then inserted over rib, into pleural space, and catheter was fed without resistance. Vacutainer bottles were then connected to catheter, removing a total of 300 cc of serosanguineous fluid.  No complications.   Chest x-ray (01/16/2023, my reading of images):  There is mild volume loss in the left hemithorax in comparison to right.  There is opacification of the left base with no evidence of pneumothorax.    Complications: none    Estimated Blood Loss (EBL): 0ml             Chhaya Jefferson MD, [facility]

## 2023-01-17 LAB
ANION GAP SERPL CALC-SCNC: 4 MEQ/L
BUN SERPL-MCNC: 5.1 MG/DL (ref 9.8–20.1)
CALCIUM SERPL-MCNC: 8.2 MG/DL (ref 8.4–10.2)
CHLORIDE SERPL-SCNC: 98 MMOL/L (ref 98–107)
CO2 SERPL-SCNC: 26 MMOL/L (ref 23–31)
CREAT SERPL-MCNC: 0.73 MG/DL (ref 0.55–1.02)
CREAT UR-MCNC: 58.5 MG/DL (ref 47–110)
CREAT/UREA NIT SERPL: 7
ESTROGEN SERPL-MCNC: NORMAL PG/ML
GFR SERPLBLD CREATININE-BSD FMLA CKD-EPI: >60 MLS/MIN/1.73/M2
GLUCOSE SERPL-MCNC: 95 MG/DL (ref 82–115)
INSULIN SERPL-ACNC: NORMAL U[IU]/ML
LAB AP CLINICAL INFORMATION: NORMAL
LAB AP GROSS DESCRIPTION: NORMAL
LAB AP NON-GYN INTERPRETATION SPECIMEN 1: NORMAL
LAB AP NON-GYN SPECIMEN 1 ADEQUACY: NORMAL
MAGNESIUM SERPL-MCNC: 1.6 MG/DL (ref 1.6–2.6)
OSMOLALITY SERPL: 272 MOSM/KG (ref 280–300)
OSMOLALITY UR: 287 MOSM/KG (ref 300–1300)
POTASSIUM SERPL-SCNC: 3.9 MMOL/L (ref 3.5–5.1)
PROCALCITONIN SERPL-MCNC: <0.1 NG/ML
RHODAMINE-AURAMINE STN SPEC: NORMAL
SODIUM SERPL-SCNC: 128 MMOL/L (ref 136–145)
SODIUM UR-SCNC: 64 MMOL/L
T3RU NFR SERPL: NORMAL %
UUN UR-MCNC: 185 MG/DL

## 2023-01-17 PROCEDURE — 84300 ASSAY OF URINE SODIUM: CPT | Performed by: INTERNAL MEDICINE

## 2023-01-17 PROCEDURE — 25000003 PHARM REV CODE 250: Performed by: HOSPITALIST

## 2023-01-17 PROCEDURE — 25000003 PHARM REV CODE 250: Performed by: INTERNAL MEDICINE

## 2023-01-17 PROCEDURE — 97110 THERAPEUTIC EXERCISES: CPT

## 2023-01-17 PROCEDURE — 27000221 HC OXYGEN, UP TO 24 HOURS

## 2023-01-17 PROCEDURE — 25000242 PHARM REV CODE 250 ALT 637 W/ HCPCS: Performed by: INTERNAL MEDICINE

## 2023-01-17 PROCEDURE — 97162 PT EVAL MOD COMPLEX 30 MIN: CPT

## 2023-01-17 PROCEDURE — 11000001 HC ACUTE MED/SURG PRIVATE ROOM

## 2023-01-17 PROCEDURE — 83930 ASSAY OF BLOOD OSMOLALITY: CPT | Performed by: INTERNAL MEDICINE

## 2023-01-17 PROCEDURE — 36415 COLL VENOUS BLD VENIPUNCTURE: CPT | Performed by: INTERNAL MEDICINE

## 2023-01-17 PROCEDURE — 83735 ASSAY OF MAGNESIUM: CPT | Performed by: INTERNAL MEDICINE

## 2023-01-17 PROCEDURE — 63600175 PHARM REV CODE 636 W HCPCS: Performed by: HOSPITALIST

## 2023-01-17 PROCEDURE — 83935 ASSAY OF URINE OSMOLALITY: CPT | Performed by: INTERNAL MEDICINE

## 2023-01-17 PROCEDURE — 80048 BASIC METABOLIC PNL TOTAL CA: CPT | Performed by: INTERNAL MEDICINE

## 2023-01-17 PROCEDURE — 84520 ASSAY OF UREA NITROGEN: CPT | Performed by: INTERNAL MEDICINE

## 2023-01-17 PROCEDURE — 94761 N-INVAS EAR/PLS OXIMETRY MLT: CPT

## 2023-01-17 PROCEDURE — 82570 ASSAY OF URINE CREATININE: CPT | Performed by: INTERNAL MEDICINE

## 2023-01-17 RX ORDER — TRAMADOL HYDROCHLORIDE 50 MG/1
50 TABLET ORAL EVERY 6 HOURS PRN
Status: DISCONTINUED | OUTPATIENT
Start: 2023-01-17 | End: 2023-01-27 | Stop reason: HOSPADM

## 2023-01-17 RX ORDER — IPRATROPIUM BROMIDE AND ALBUTEROL SULFATE 2.5; .5 MG/3ML; MG/3ML
3 SOLUTION RESPIRATORY (INHALATION) EVERY 6 HOURS PRN
Status: DISCONTINUED | OUTPATIENT
Start: 2023-01-17 | End: 2023-01-27 | Stop reason: HOSPADM

## 2023-01-17 RX ADMIN — ESCITALOPRAM OXALATE 5 MG: 5 TABLET, FILM COATED ORAL at 08:01

## 2023-01-17 RX ADMIN — CHOLESTYRAMINE 4 G: 4 POWDER, FOR SUSPENSION ORAL at 08:01

## 2023-01-17 RX ADMIN — THERA TABS 1 TABLET: TAB at 09:01

## 2023-01-17 RX ADMIN — UMECLIDINIUM BROMIDE AND VILANTEROL TRIFENATATE 1 PUFF: 62.5; 25 POWDER RESPIRATORY (INHALATION) at 04:01

## 2023-01-17 RX ADMIN — TRAMADOL HYDROCHLORIDE 50 MG: 50 TABLET, COATED ORAL at 08:01

## 2023-01-17 RX ADMIN — BENZONATATE 100 MG: 100 CAPSULE ORAL at 09:01

## 2023-01-17 RX ADMIN — URSODIOL 500 MG: 250 TABLET ORAL at 08:01

## 2023-01-17 RX ADMIN — AMLODIPINE BESYLATE 10 MG: 5 TABLET ORAL at 09:01

## 2023-01-17 RX ADMIN — VALSARTAN 160 MG: 80 TABLET, FILM COATED ORAL at 09:01

## 2023-01-17 RX ADMIN — BENZONATATE 100 MG: 100 CAPSULE ORAL at 08:01

## 2023-01-17 RX ADMIN — OXYBUTYNIN CHLORIDE 5 MG: 5 TABLET ORAL at 09:01

## 2023-01-17 RX ADMIN — OXYBUTYNIN CHLORIDE 5 MG: 5 TABLET ORAL at 08:01

## 2023-01-17 RX ADMIN — Medication 1 CAPSULE: at 09:01

## 2023-01-17 RX ADMIN — PIPERACILLIN AND TAZOBACTAM 4.5 G: 4; .5 INJECTION, POWDER, FOR SOLUTION INTRAVENOUS; PARENTERAL at 11:01

## 2023-01-17 RX ADMIN — PIPERACILLIN AND TAZOBACTAM 4.5 G: 4; .5 INJECTION, POWDER, FOR SOLUTION INTRAVENOUS; PARENTERAL at 03:01

## 2023-01-17 RX ADMIN — TRAMADOL HYDROCHLORIDE 50 MG: 50 TABLET, COATED ORAL at 01:01

## 2023-01-17 RX ADMIN — BENZONATATE 100 MG: 100 CAPSULE ORAL at 04:01

## 2023-01-17 RX ADMIN — PIPERACILLIN AND TAZOBACTAM 4.5 G: 4; .5 INJECTION, POWDER, FOR SOLUTION INTRAVENOUS; PARENTERAL at 06:01

## 2023-01-17 RX ADMIN — Medication 1 TABLET: at 09:01

## 2023-01-17 NOTE — PLAN OF CARE
Problem: Physical Therapy  Goal: Physical Therapy Goal  Description: Goals to be met by: 2023     Patient will increase functional independence with mobility by performin. Supine to sit with [place]  2. Sit to stand transfer with Modified [place]  3. Gait  x 200 feet with Modified [place] using Rolling Walker.     Outcome: Ongoing, Progressing

## 2023-01-17 NOTE — PT/OT/SLP EVAL
Physical Therapy Evaluation    Patient Name:  Purnima Higgins   MRN:  [id number]    Recommendations:     Discharge Recommendations: home, home with home health, home health PT   Discharge Equipment Recommendations: none   Barriers to discharge: None    Assessment:     Purnima Higgins is a 82 y.o. female admitted with a medical diagnosis of Pleural effusion on left, hyponatremia, s/p thoracentesis, resp failure.  She presents with the following impairments/functional limitations: weakness, impaired endurance, impaired self care skills, impaired functional mobility, gait instability, impaired balance. At baseline, pt lives with her  and is independent with mobility and ADLs. Pt would benefit from acute PT to regain PLOF.     Rehab Prognosis: Good; patient would benefit from acute skilled PT services to address these deficits and reach maximum level of function.    Recent Surgery: * No surgery found *      Plan:     During this hospitalization, patient to be seen 6 x/week to address the identified rehab impairments via gait training, therapeutic activities, therapeutic exercises and progress toward the following goals:    Plan of Care Expires:       Subjective     Chief Complaint: needing to use bathroom  Patient/Family Comments/goals: to go home  Pain/Comfort:  Pain Rating 1: 0/10    Patients cultural, spiritual, Hoahaoism conflicts given the current situation: no    Living Environment:  Pt lives in one story home with spouse.   Prior to admission, patients level of function was independent.  Equipment used at home: none.  DME owned (not currently used): rolling walker, single point cane, shower chair, and wheelchair.  Upon discharge, patient will have assistance from .    Objective:     Communicated with nurse prior to session.  Patient found supine with oxygen, peripheral IV, PureWick  upon PT entry to room.    General Precautions: Standard, fall  Orthopedic Precautions:    Braces:    Respiratory  Status: Nasal cannula, flow 1 L/min    Exams:  RLE ROM: WNL  RLE Strength: WNL  LLE ROM: WNL  LLE Strength: WNL    Functional Mobility:  Bed Mobility:     Scooting: contact guard assistance  Supine to Sit: minimum assistance  Transfers:     Sit to Stand:  contact guard assistance with rolling walker from bed and toilet  Gait: 60ft with RW and CGA, slow pace, kyphotic posture from previous spinal surgeries.       AM-PAC 6 CLICK MOBILITY  Total Score:18       Patient left up in chair with all lines intact and call button in reach.    GOALS:   Multidisciplinary Problems       Physical Therapy Goals          Problem: Physical Therapy    Goal Priority Disciplines Outcome Goal Variances Interventions   Physical Therapy Goal     PT, PT/OT Ongoing, Progressing     Description: Goals to be met by: 2023     Patient will increase functional independence with mobility by performin. Supine to sit with [place]  2. Sit to stand transfer with Modified [place]  3. Gait  x 200 feet with Modified [place] using Rolling Walker.                          History:     Past Medical History:   Diagnosis Date    Arthritis     Back pain     scs implant- Dr ritter    Dyslipidemia     Hypertension     Liver disease     Spinal cord disorder        Past Surgical History:   Procedure Laterality Date    APPENDECTOMY      BACK SURGERY       SECTION      CHOLECYSTECTOMY      LAPAROSCOPIC REPAIR OF INCISIONAL HERNIA N/A 6/15/2022    Procedure: REPAIR, HERNIA, INCISIONAL, LAPAROSCOPIC;  Surgeon: Nayan Gonzalez MD;  Location: [facility];  Service: General;  Laterality: N/A;    NECK SURGERY      SHOULDER SURGERY Bilateral     TOTAL KNEE ARTHROPLASTY         Time Tracking:     PT Received On: 23  PT Start Time: 946     PT Stop Time: 1017  PT Total Time (min): 31 min     Billable Minutes: Evaluation 31      2023

## 2023-01-17 NOTE — PROGRESS NOTES
[facility] - 5th Floor Med Surg  Pulmonary/Critical Care - Medicine  Progress Note    Patient Name: Purnima Higgins  MRN: [id number]  Admission Date: 1/13/2023  Hospital Length of Stay: 4 days  Code Status: Full Code  Attending Provider: Yefri Herrera MD  Primary Care Provider: Jos Briseno MD     Subjective:     HPI:   This is an 82-year-old female with a history of HTN, HLD, back pain with implanted stimulator who has been experiencing a cough x 3 weeks. She failed outpatient treatment by PCP having received doxycycline, prednisone and Levaquin. She was then referred to the ED for further evaluation on 1/13/23. She also reported new onset swelling in her lower extremities data to presentation. CXR in ER revealed a small left pleural effusion, unchanged from previous imaging. CT of chest redemonstrated left pleural effusion. She was admitted to hospital medicine and pulmonary was consulted for evaluation of pleural effusion.  Dr. Mason performed ultrasound evaluation of the left chest in the ED on 01/13/2023, revealing a reportedly small left pleural effusion, felt that this was too small for safe sampling. Respiratory cultures normal upper respiratory prasanth.  Respiratory PCR panel negative, SARS-COVID-2 PCR negative, influenza A/B negative PCR.     Interval History/Significant Events:   Left sided thoracentesis performed by Dr Jefferson on 1/16/23 for removal of 300cc serosanguinous fluid. Fluid was exudative per lights criteria. Pathology pending on fluid. States she feels like shes a little more short of breath this morning. Oxygenating well on 2L NC    Scheduled Medications:   amLODIPine  10 mg Oral Daily    benzonatate  100 mg Oral TID    calcium-vitamin D3  1 tablet Oral Daily    cholestyramine  1 packet Oral BID    EScitalopram oxalate  5 mg Oral Daily    guaiFENesin  600 mg Oral BID    Lactobacillus acidophilus  1 capsule Oral Daily    multivitamin  1 tablet Oral Daily    oxybutynin  5 mg Oral  BID    piperacillin-tazobactam (ZOSYN) IVPB  4.5 g Intravenous Q8H    umeclidinium-vilanteroL  1 puff Inhalation Daily    ursodioL  500 mg Oral BID    valsartan  160 mg Oral Daily     PRN Medications:  albuterol-ipratropium, aluminum-magnesium hydroxide-simethicone, amitriptyline, dextromethorphan-guaiFENesin  mg/5 ml, melatonin, ondansetron, polyethylene glycol, prochlorperazine, senna-docusate 8.6-50 mg, simethicone, sodium chloride 0.9%  Continuous Infusions:      Past Medical History:   Diagnosis Date    Arthritis     Back pain     scs implant- Dr ritter    Dyslipidemia     Hypertension     Liver disease     Spinal cord disorder        Past Surgical History:   Procedure Laterality Date    APPENDECTOMY      BACK SURGERY       SECTION      CHOLECYSTECTOMY      LAPAROSCOPIC REPAIR OF INCISIONAL HERNIA N/A 6/15/2022    Procedure: REPAIR, HERNIA, INCISIONAL, LAPAROSCOPIC;  Surgeon: Nayan Gonzalez MD;  Location: [facility];  Service: General;  Laterality: N/A;    NECK SURGERY      SHOULDER SURGERY Bilateral     TOTAL KNEE ARTHROPLASTY         Objective:     Input/output:    Intake/Output Summary (Last 24 hours) at 2023 0832  Last data filed at 2023 0529  Gross per 24 hour   Intake 1140 ml   Output 1876 ml   Net -736 ml         Vital Signs (Most Recent):  Temp: 98.2 °F (36.8 °C) (23)  Pulse: 68 (23)  Resp: 16 (23)  BP: (!) 162/76 (23)  SpO2: 96 % (23)    Body mass index is 36.57 kg/m².  Weight: 79.4 kg (175 lb) Vital Signs (24h Range):  Temp:  [98.1 °F (36.7 °C)-98.8 °F (37.1 °C)] 98.2 °F (36.8 °C)  Pulse:  [68-79] 68  Resp:  [16-18] 16  SpO2:  [93 %-96 %] 96 %  BP: (139-166)/(73-80) 162/76     Physical Exam  Constitutional:       Appearance: She is obese.   HENT:      Mouth/Throat:      Mouth: Mucous membranes are moist.      Pharynx: Oropharynx is clear.   Eyes:      Conjunctiva/sclera: Conjunctivae normal.      Pupils: Pupils are equal,  round, and reactive to light.   Cardiovascular:      Rate and Rhythm: Normal rate and regular rhythm.   Pulmonary:      Comments: Decreased breath sounds left posterior base with E to A changes  I performed ultrasound evaluation of the left hemithorax revealing a moderate sized left pleural effusion with compensatory atelectasis of the left lower lobe.  Abdominal:      General: Bowel sounds are normal.      Palpations: Abdomen is soft.   Musculoskeletal:      Right lower leg: No edema.      Left lower leg: No edema.   Lymphadenopathy:      Cervical: No cervical adenopathy.   Skin:     General: Skin is warm and dry.   Neurological:      Mental Status: She is alert and oriented to person, place, and time.       Lines/Drains/Airways       Peripheral Intravenous Line  Duration                  Peripheral IV - Single Lumen 01/13/23 2007 20 G Anterior;Distal;Left Antecubital 3 days                  Significant Labs:    Lab Results   Component Value Date    WBC 8.4 01/16/2023    HGB 10.8 (L) 01/16/2023    HCT 31.7 (L) 01/16/2023    MCV 90.1 01/16/2023     01/16/2023         No results for input(s): NA, K, CL, CO2, BUN, CREATININE, CALCIUM, MG, TRIG, ANIONGAP, AST, ALT, ALKPHOS, ALBUMIN, CKTOTAL, CKMB, TROPONINI, BNP, INR, PROTIME, PTT, EGFRNONAA in the last 24 hours.    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

## 2023-01-17 NOTE — PROGRESS NOTES
[facility]  Hospital Medicine Progress Note        Chief Complaint: Inpatient Follow-up for shortness of breath    HPI:   This is an 82-year-old female medical history as detailed below presented to the ED with complaint of persistent cough and shortness breath and outpatient chest x-ray showing left lower lobe pneumonia/pleural effusion.  Her symptom initially started the last week of December 2022 and was started on doxycycline and prednisone on 12/27/2022, her symptom did not improve and continued to have cough productive of whitish sputum, she again followed up with her PCP and was prescribed levofloxacin on  01/04/2023 for 10 days.  Report low-grade fever 100.2 to 100.4 intermittent throughout the past week, and also noted slight bilateral pedal edema.  Denies chest pain or pleurisy.  Denies weight loss or night sweats.     On arrival to ED she was afebrile, hypertensive and saturating 93% on room air.  Labs showed no leukocytosis or left shift, sodium 125, creatinine 1.04, COVID 19 and flu negative, normal BNP.  CT chest with contrast show left lower lobe complete collapse consolidation, moderate volume left pleural effusion, no enlarged lymph nodes. Referred to hospital medicine service for further evaluation and management.    Pulmonary was consulted.  Empiric antibiotics were continued.    Interval Hx:     No fever.  Blood pressure elevated.  Doing well on 1 L nasal cannula oxygen.  Reports intermittent cough.   at bedside.    X-ray reviewed, pleural fluid suggesting possible axillary to pathology. Pleural fluid cytology pending.  Hyponatremia continues.    Objective/physical exam:  Vitals:    01/16/23 2000 01/16/23 2311 01/17/23 0335 01/17/23 0741   BP: (!) 166/76 (!) 151/80 (!) 156/76 (!) 162/76   BP Location:       Patient Position:       Pulse: 74 71 74 68   Resp: 18 18 18 16   Temp: 98.1 °F (36.7 °C) 98.8 °F (37.1 °C) 98.5 °F (36.9 °C) 98.2 °F (36.8 °C)   TempSrc:   Oral Oral Oral   SpO2: 96% 95% (!) 94% 96%   Weight:       Height:         General: In no acute distress, afebrile  Respiratory: Clear to auscultation bilaterally  Cardiovascular: S1, S2, no appreciable murmur  Abdomen: Soft, nontender, BS +  MSK: Warm, no lower extremity edema, no clubbing or cyanosis  Neurologic: Alert and oriented x4, moving all extremities with good strength     Lab Results   Component Value Date     (L) 01/17/2023    K 3.9 01/17/2023    CO2 26 01/17/2023    BUN 5.1 (L) 01/17/2023    CREATININE 0.73 01/17/2023    CALCIUM 8.2 (L) 01/17/2023    EGFRNONAA >60 05/15/2022      Lab Results   Component Value Date    ALT 15 01/16/2023    AST 22 01/16/2023    ALKPHOS 71 01/16/2023    BILITOT 0.6 01/16/2023      Lab Results   Component Value Date    WBC 8.4 01/16/2023    HGB 10.8 (L) 01/16/2023    HCT 31.7 (L) 01/16/2023    MCV 90.1 01/16/2023     01/16/2023           Medications:   amLODIPine  10 mg Oral Daily    benzonatate  100 mg Oral TID    calcium-vitamin D3  1 tablet Oral Daily    cholestyramine  1 packet Oral BID    EScitalopram oxalate  5 mg Oral Daily    guaiFENesin  600 mg Oral BID    Lactobacillus acidophilus  1 capsule Oral Daily    multivitamin  1 tablet Oral Daily    oxybutynin  5 mg Oral BID    piperacillin-tazobactam (ZOSYN) IVPB  4.5 g Intravenous Q8H    umeclidinium-vilanteroL  1 puff Inhalation Daily    ursodioL  500 mg Oral BID    valsartan  160 mg Oral Daily      albuterol-ipratropium, albuterol-ipratropium, aluminum-magnesium hydroxide-simethicone, amitriptyline, dextromethorphan-guaiFENesin  mg/5 ml, melatonin, ondansetron, polyethylene glycol, prochlorperazine, senna-docusate 8.6-50 mg, simethicone, sodium chloride 0.9%     Assessment/Plan:    Acute hypoxemic respiratory failure-improving   Left-sided pleural effusion s/p Thoracentesis 1/16/23  Hyponatremia-likely thiazide induced  -?  Chronic     HX: PBC on ursodiol and cholestyramine, HTN, DDD/osteoarthritis  on spinal stimulator, migraine headache, short period (6 years) of smoking in her 30s     Plan:   - follow up pleural fluid analysis, pathology.  Continue antibiotics.    -Continue to encourage IS use.  Wean off as tolerated.  -monitor sodium.  Hold   Thiazides.  We will repeat urine labs, fluid restriction to 1L. Consider Po salt   -other home medications reviewed            Tristan Hodgson MD

## 2023-01-17 NOTE — PLAN OF CARE
Spoke to patient and her husbad about discharge plan they have used [facility] in the past and would like to use them again at discharge

## 2023-01-17 NOTE — PLAN OF CARE
01/17/23 1525   Discharge Assessment   Assessment Type Discharge Planning Assessment   Confirmed/corrected address, phone number and insurance Yes   Confirmed Demographics Correct on Facesheet   Source of Information patient;family   When was your last doctors appointment? 01/13/23   Communicated LIZETT with patient/caregiver Date not available/Unable to determine   Reason For Admission Pleural effusion, SOB   People in Home spouse   Do you expect to return to your current living situation? Yes   Prior to hospitilization cognitive status: Alert/Oriented   Current cognitive status: Alert/Oriented   Home Layout Able to live on 1st floor   Equipment Currently Used at Home none   Readmission within 30 days? No   Patient currently being followed by outpatient case management? No   Do you currently have service(s) that help you manage your care at home? No   Do you take prescription medications? Yes   Do you have prescription coverage? Yes   Do you have any problems affording any of your prescribed medications? No   Is the patient taking medications as prescribed? yes   How do you get to doctors appointments? car, drives self   Are you on dialysis? No   Do you take coumadin? No   Discharge Plan A Home Health   Discharge Plan B Home   DME Needed Upon Discharge  none   Discharge Plan discussed with: Patient;Spouse/sig other   Name(s) and Number(s) Lionel Higgins   Discharge Barriers Identified None

## 2023-01-17 NOTE — PT/OT/SLP PROGRESS
Occupational Therapy   Treatment    Name: Prunima Higgins  MRN: [id number]  Admitting Diagnosis:  Pleural effusion on left       Recommendations:     Discharge Recommendations: home with home health and family assist provided  Discharge Equipment Recommendations: hip kit  Barriers to discharge:  medical dx    Assessment:     Purnima Higgins is a 82 y.o. female with a medical diagnosis of Pleural effusion on left.  She presents with c/o LBP. Performance deficits affecting function are weakness, impaired functional mobility, impaired endurance, impaired balance, impaired self care skills.     Rehab Prognosis:  Good; patient would benefit from acute skilled OT services to address these deficits and reach maximum level of function.       Plan:     Patient to be seen 3 x/week, 5 x/week to address the above listed problems via self-care/home management, therapeutic activities, therapeutic exercises  Plan of Care Expires: 01/30/23  Plan of Care Reviewed with: patient, spouse    Subjective     Pain/Comfort:  LBP  Pain Rating 1: 6/10  RN notified of pt's request for pain medication.    Objective:     Communicated with: RN prior to session.  Patient found up in chair with oxygen, peripheral IV, PureWick upon OT entry to room.    General Precautions: Standard, fall    Orthopedic Precautions:N/A  Braces: N/A  Respiratory Status: Nasal cannula, flow 1 L/min  Vitals:  O2: 91-96%  IN: 75-93     Occupational Performance:     Functional Mobility/Transfers:  Patient completed Sit <> Stand Transfers with min A (x2 stands) progressing to CGA and SBA provided, using RW. Pt also required vcs for proper hand placement during sit <> stand.     Treatment & Education:  Pt performed sit to stand exercise 3x5 with min A (x2 stands) progressing to CGA and SBA provided, using RW. Pt also required vcs for proper hand placement during sit <> stand. Pt participated in therapeutic exercise to increase strength and endurance of BU/LE needed for  ADL/IADL participation.    Patient left up in chair with all lines intact, call button in reach, and RN notified.    GOALS:   Multidisciplinary Problems       Occupational Therapy Goals          Problem: Occupational Therapy    Goal Priority Disciplines Outcome Interventions   Occupational Therapy Goal     OT, PT/OT Ongoing, Progressing    Description: Goals to be met by: 1/30     Patient will increase functional independence with ADLs by performing:    LE Dressing with Modified [place].  Grooming while standing at sink with Modified [place].  Toileting from toilet with Modified [place] for hygiene and clothing management.   Bathing from  shower chair/bench with Modified [place].  Toilet transfer to toilet with Modified [place].                         Time Tracking:     OT Date of Treatment: 1/17/23  OT Start Time: 1251  OT Stop Time: 1313  OT Total Time (min): 22 min    Billable Minutes:Therapeutic Exercise 22 mins    OT/BOB: OT     BOB Visit Number: 1 1/17/2023

## 2023-01-18 LAB
AV INDEX (PROSTH): 0.58
AV MEAN GRADIENT: 8 MMHG
AV PEAK GRADIENT: 15 MMHG
AV VALVE AREA: 1.66 CM2
AV VELOCITY RATIO: 0.56
BSA FOR ECHO PROCEDURE: 1.8 M2
CORTIS SERPL-SCNC: 11.2 UG/DL
CV ECHO LV RWT: 0.59 CM
DOP CALC AO PEAK VEL: 1.95 M/S
DOP CALC AO VTI: 40.9 CM
DOP CALC LVOT AREA: 2.8 CM2
DOP CALC LVOT DIAMETER: 1.9 CM
DOP CALC LVOT PEAK VEL: 1.1 M/S
DOP CALC LVOT STROKE VOLUME: 67.73 CM3
DOP CALC MV VTI: 35.8 CM
DOP CALCLVOT PEAK VEL VTI: 23.9 CM
E WAVE DECELERATION TIME: 201 MSEC
E/A RATIO: 0.96
E/E' RATIO: 8.63 M/S
ECHO LV POSTERIOR WALL: 1.23 CM (ref 0.6–1.1)
EJECTION FRACTION: 60 %
FRACTIONAL SHORTENING: 30 % (ref 28–44)
GAMMA INTERFERON BACKGROUND BLD IA-ACNC: 0.03 IU/ML
INTERVENTRICULAR SEPTUM: 1.07 CM (ref 0.6–1.1)
LEFT ATRIUM SIZE: 4.1 CM
LEFT ATRIUM VOLUME INDEX MOD: 34.7 ML/M2
LEFT ATRIUM VOLUME MOD: 59.7 CM3
LEFT INTERNAL DIMENSION IN SYSTOLE: 2.93 CM (ref 2.1–4)
LEFT VENTRICLE DIASTOLIC VOLUME INDEX: 45.41 ML/M2
LEFT VENTRICLE DIASTOLIC VOLUME: 78.1 ML
LEFT VENTRICLE MASS INDEX: 97 G/M2
LEFT VENTRICLE SYSTOLIC VOLUME INDEX: 19.2 ML/M2
LEFT VENTRICLE SYSTOLIC VOLUME: 33 ML
LEFT VENTRICULAR INTERNAL DIMENSION IN DIASTOLE: 4.19 CM (ref 3.5–6)
LEFT VENTRICULAR MASS: 166.83 G
LV LATERAL E/E' RATIO: 6.9 M/S
LV SEPTAL E/E' RATIO: 11.5 M/S
LVOT MG: 3 MMHG
LVOT MV: 0.78 CM/S
M TB IFN-G BLD-IMP: NEGATIVE
M TB IFN-G CD4+ BCKGRND COR BLD-ACNC: -0.01 IU/ML
M TB IFN-G CD4+CD8+ BCKGRND COR BLD-ACNC: -0.01 IU/ML
MITOGEN IGNF BCKGRD COR BLD-ACNC: 4.74 IU/ML
MV MEAN GRADIENT: 2 MMHG
MV PEAK A VEL: 0.72 M/S
MV PEAK E VEL: 0.69 M/S
MV PEAK GRADIENT: 5 MMHG
MV STENOSIS PRESSURE HALF TIME: 113 MS
MV VALVE AREA BY CONTINUITY EQUATION: 1.89 CM2
MV VALVE AREA P 1/2 METHOD: 1.95 CM2
PISA TR MAX VEL: 2.1 M/S
PV PEAK VELOCITY: 1.24 CM/S
RA PRESSURE: 3 MMHG
TDI LATERAL: 0.1 M/S
TDI SEPTAL: 0.06 M/S
TDI: 0.08 M/S
TR MAX PG: 18 MMHG
TRICUSPID ANNULAR PLANE SYSTOLIC EXCURSION: 2.11 CM
TV REST PULMONARY ARTERY PRESSURE: 21 MMHG

## 2023-01-18 PROCEDURE — 36415 COLL VENOUS BLD VENIPUNCTURE: CPT | Performed by: INTERNAL MEDICINE

## 2023-01-18 PROCEDURE — 87040 BLOOD CULTURE FOR BACTERIA: CPT | Performed by: INTERNAL MEDICINE

## 2023-01-18 PROCEDURE — 25000242 PHARM REV CODE 250 ALT 637 W/ HCPCS: Performed by: INTERNAL MEDICINE

## 2023-01-18 PROCEDURE — 25000003 PHARM REV CODE 250: Performed by: INTERNAL MEDICINE

## 2023-01-18 PROCEDURE — 25000003 PHARM REV CODE 250: Performed by: HOSPITALIST

## 2023-01-18 PROCEDURE — 27000221 HC OXYGEN, UP TO 24 HOURS

## 2023-01-18 PROCEDURE — 94761 N-INVAS EAR/PLS OXIMETRY MLT: CPT

## 2023-01-18 PROCEDURE — 63600175 PHARM REV CODE 636 W HCPCS: Performed by: HOSPITALIST

## 2023-01-18 PROCEDURE — 11000001 HC ACUTE MED/SURG PRIVATE ROOM

## 2023-01-18 PROCEDURE — 97116 GAIT TRAINING THERAPY: CPT

## 2023-01-18 PROCEDURE — 82533 TOTAL CORTISOL: CPT | Performed by: INTERNAL MEDICINE

## 2023-01-18 PROCEDURE — 63600175 PHARM REV CODE 636 W HCPCS: Performed by: INTERNAL MEDICINE

## 2023-01-18 PROCEDURE — 97535 SELF CARE MNGMENT TRAINING: CPT | Mod: CO

## 2023-01-18 RX ADMIN — PIPERACILLIN AND TAZOBACTAM 4.5 G: 4; .5 INJECTION, POWDER, FOR SOLUTION INTRAVENOUS; PARENTERAL at 03:01

## 2023-01-18 RX ADMIN — BENZONATATE 100 MG: 100 CAPSULE ORAL at 08:01

## 2023-01-18 RX ADMIN — OXYBUTYNIN CHLORIDE 5 MG: 5 TABLET ORAL at 08:01

## 2023-01-18 RX ADMIN — TRAMADOL HYDROCHLORIDE 50 MG: 50 TABLET, COATED ORAL at 08:01

## 2023-01-18 RX ADMIN — UMECLIDINIUM BROMIDE AND VILANTEROL TRIFENATATE 1 PUFF: 62.5; 25 POWDER RESPIRATORY (INHALATION) at 04:01

## 2023-01-18 RX ADMIN — VALSARTAN 160 MG: 80 TABLET, FILM COATED ORAL at 08:01

## 2023-01-18 RX ADMIN — CHOLESTYRAMINE 4 G: 4 POWDER, FOR SUSPENSION ORAL at 08:01

## 2023-01-18 RX ADMIN — Medication 1 CAPSULE: at 08:01

## 2023-01-18 RX ADMIN — BENZONATATE 100 MG: 100 CAPSULE ORAL at 04:01

## 2023-01-18 RX ADMIN — PIPERACILLIN AND TAZOBACTAM 4.5 G: 4; .5 INJECTION, POWDER, FOR SOLUTION INTRAVENOUS; PARENTERAL at 12:01

## 2023-01-18 RX ADMIN — AMLODIPINE BESYLATE 10 MG: 5 TABLET ORAL at 08:01

## 2023-01-18 RX ADMIN — Medication 1 TABLET: at 08:01

## 2023-01-18 RX ADMIN — URSODIOL 500 MG: 250 TABLET ORAL at 09:01

## 2023-01-18 RX ADMIN — ESCITALOPRAM OXALATE 5 MG: 5 TABLET, FILM COATED ORAL at 08:01

## 2023-01-18 RX ADMIN — PIPERACILLIN AND TAZOBACTAM 4.5 G: 4; .5 INJECTION, POWDER, FOR SOLUTION INTRAVENOUS; PARENTERAL at 08:01

## 2023-01-18 RX ADMIN — URSODIOL 500 MG: 250 TABLET ORAL at 08:01

## 2023-01-18 RX ADMIN — THERA TABS 1 TABLET: TAB at 08:01

## 2023-01-18 NOTE — PROGRESS NOTES
[facility] - 5th Floor Med Surg  Pulmonary/Critical Care - Medicine  Progress Note    Patient Name: Purnima Higgins  MRN: [id number]  Admission Date: 1/13/2023  Hospital Length of Stay: 5 days  Code Status: Full Code  Attending Provider: Tristan Hodgson MD  Primary Care Provider: Jos Briseno MD     Subjective:     HPI:   This is an 82-year-old female with a history of HTN, HLD, back pain with implanted stimulator who has been experiencing a cough x 3 weeks. She failed outpatient treatment by PCP having received doxycycline, prednisone and Levaquin. She was then referred to the ED for further evaluation on 1/13/23. She also reported new onset swelling in her lower extremities data to presentation. CXR in ER revealed a small left pleural effusion, unchanged from previous imaging. CT of chest redemonstrated left pleural effusion. She was admitted to hospital medicine and pulmonary was consulted for evaluation of pleural effusion.  She underwent left thoracentesis on 01/16/23, removing ~300cc of thin, serosanguinous fluid.      Interval History/Significant Events:   She is afebrile.  No new culture growth.  She is receiving IV Zosyn.  SaO2 94-96% on 1 L nasal cannula.  Transthoracic echocardiogram 01/17/2023 with normal LVSF, EF 60%.  Grade 1 diastolic dysfunction noted.  There is a small mobile echogenic structure on the aortic valve with some calcification.  Pleural fluid cultures continue with no growth.  Pleural fluid cytology returning consistent with a bloody fluid with numerous histiocytes and reactive mesothelial cells, no evidence of malignancy.  She admitted to some cough yesterday, no secretions.  She denies chest pain, shoulder pain, or back pain.  She states that her breathing is markedly improved.    Scheduled Medications:   amLODIPine  10 mg Oral Daily    benzonatate  100 mg Oral TID    calcium-vitamin D3  1 tablet Oral Daily    cholestyramine  1 packet Oral BID    EScitalopram oxalate   5 mg Oral Daily    guaiFENesin  600 mg Oral BID    Lactobacillus acidophilus  1 capsule Oral Daily    multivitamin  1 tablet Oral Daily    oxybutynin  5 mg Oral BID    piperacillin-tazobactam (ZOSYN) IVPB  4.5 g Intravenous Q8H    umeclidinium-vilanteroL  1 puff Inhalation Daily    ursodioL  500 mg Oral BID    valsartan  160 mg Oral Daily     PRN Medications:  albuterol-ipratropium, albuterol-ipratropium, aluminum-magnesium hydroxide-simethicone, amitriptyline, dextromethorphan-guaiFENesin  mg/5 ml, melatonin, ondansetron, polyethylene glycol, prochlorperazine, senna-docusate 8.6-50 mg, simethicone, sodium chloride 0.9%, traMADoL  Continuous Infusions:      Past Medical History:   Diagnosis Date    Arthritis     Back pain     scs implant- Dr rtiter    Dyslipidemia     Hypertension     Liver disease     Spinal cord disorder        Past Surgical History:   Procedure Laterality Date    APPENDECTOMY      BACK SURGERY       SECTION      CHOLECYSTECTOMY      LAPAROSCOPIC REPAIR OF INCISIONAL HERNIA N/A 6/15/2022    Procedure: REPAIR, HERNIA, INCISIONAL, LAPAROSCOPIC;  Surgeon: Nayan Gonzalez MD;  Location: [facility];  Service: General;  Laterality: N/A;    NECK SURGERY      SHOULDER SURGERY Bilateral     TOTAL KNEE ARTHROPLASTY         Objective:     Input/output:    Intake/Output Summary (Last 24 hours) at 2023 0607  Last data filed at 2023 2100  Gross per 24 hour   Intake 540 ml   Output 1300 ml   Net -760 ml         Vital Signs (Most Recent):  Temp: 97.9 °F (36.6 °C) (23 0454)  Pulse: 71 (23 0454)  Resp: 18 (23 0018)  BP: 126/73 (23 0454)  SpO2: 96 % (23 045)    Body mass index is 36.58 kg/m².  Weight: 79.4 kg (175 lb 0.7 oz) Vital Signs (24h Range):  Temp:  [63.4 °F (17.4 °C)-98.4 °F (36.9 °C)] 97.9 °F (36.6 °C)  Pulse:  [65-76] 71  Resp:  [16-18] 18  SpO2:  [94 %-97 %] 96 %  BP: (126-162)/(71-76) 126/73     Physical Exam  Constitutional:       Appearance: She  is obese.   HENT:      Mouth/Throat:      Mouth: Mucous membranes are moist.      Pharynx: Oropharynx is clear.   Eyes:      Conjunctiva/sclera: Conjunctivae normal.      Pupils: Pupils are equal, round, and reactive to light.   Cardiovascular:      Rate and Rhythm: Normal rate and regular rhythm.   Pulmonary:      Breath sounds: Rhonchi (Few bilateral coarse rhonchi posterior bases) present. No wheezing or rales.   Abdominal:      General: Bowel sounds are normal.      Palpations: Abdomen is soft.   Musculoskeletal:      Right lower leg: No edema.      Left lower leg: No edema.   Lymphadenopathy:      Cervical: No cervical adenopathy.   Skin:     General: Skin is warm and dry.   Neurological:      Mental Status: She is alert and oriented to person, place, and time.       Lines/Drains/Airways       Peripheral Intravenous Line  Duration                  Peripheral IV - Single Lumen 01/13/23 2007 20 G Anterior;Distal;Left Antecubital 4 days                  Significant Labs:    Lab Results   Component Value Date    WBC 8.4 01/16/2023    HGB 10.8 (L) 01/16/2023    HCT 31.7 (L) 01/16/2023    MCV 90.1 01/16/2023     01/16/2023         Recent Labs   Lab 01/17/23  0838   *   K 3.9   CO2 26   BUN 5.1*   CREATININE 0.73   CALCIUM 8.2*   MG 1.60         Imaging:  None today    Assessment/Plan:     Neutrophil predominant serosanguineous exudative left pleural effusion, cytology negative for malignancy.  Mobile, calcified echogenic structure on aortic valve by transthoracic echocardiogram.  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  She is already established with [facility].  Blood cultures x2 sets this a.m.  Further to follow after above       Chhaya Jefferson MD, [facility]  Pulmonary/Critical Care  [facility] - 5th Floor Med Surg

## 2023-01-18 NOTE — PLAN OF CARE
Problem: Physical Therapy  Goal: Physical Therapy Goal  Description: Goals to be met by: 2023     Patient will increase functional independence with mobility by performin. Supine to sit with Mars  2. Sit to stand transfer with Modified Mars  3. Gait  x 200 feet with Modified Mars using Rolling Walker.     Outcome: Ongoing, Progressing

## 2023-01-18 NOTE — PROGRESS NOTES
[facility] MEDICINE  PROGRESS NOTE        CHIEF COMPLAINT   Hospital follow up    HOSPITAL COURSE   This is an 82-year-old female medical history as detailed below presented to the ED with complaint of persistent cough and shortness breath and outpatient chest x-ray showing left lower lobe pneumonia/pleural effusion.  Her symptom initially started the last week of December 2022 and was started on doxycycline and prednisone on 12/27/2022, her symptom did not improve and continued to have cough productive of whitish sputum, she again followed up with her PCP and was prescribed levofloxacin on  01/04/2023 for 10 days.  Report low-grade fever 100.2 to 100.4 intermittent throughout the past week, and also noted slight bilateral pedal edema.  Denies chest pain or pleurisy.  Denies weight loss or night sweats.  On arrival to ED she was afebrile, hypertensive and saturating 93% on room air.  Labs showed no leukocytosis or left shift, sodium 125, creatinine 1.04, COVID 19 and flu negative, normal BNP.  CT chest with contrast show left lower lobe complete collapse consolidation, moderate volume left pleural effusion, no enlarged lymph nodes. Referred to hospital medicine service for further evaluation and management.  Pulmonary was consulted.  Empiric antibiotics were continued.  Bedside thoracentesis performed January 16 300 cc serosanguineous fluid removed.    Today  Seen examined this morning.  Seems to be doing better from a shortness of breath standpoint should be able to wean her to room air this morning.  Echocardiogram noting some abnormality to the valve, [facility] to be consulted for [facility].        OBJECTIVE/PHYSICAL EXAM     VITAL SIGNS (MOST RECENT):  Temp: 98 °F (36.7 °C) (01/18/23 0804)  Pulse: 68 (01/18/23 0804)  Resp: 17 (01/18/23 0830)  BP: 128/78 (01/18/23 0804)  SpO2: 96 % (01/18/23 0804) VITAL SIGNS (24 HOUR RANGE):  Temp:  [63.4 °F (17.4 °C)-98.4 °F (36.9 °C)] 98 °F (36.7  °C)  Pulse:  [65-76] 68  Resp:  [16-18] 17  SpO2:  [94 %-97 %] 96 %  BP: (126-155)/(71-78) 128/78   GENERAL: In no acute distress, afebrile  HEENT:  CHEST:  Decreased breath sounds at the left lung base and slight crackle otherwise clear  HEART: S1, S2, no appreciable murmur  ABDOMEN: Soft, nontender, BS +  MSK: Warm, no lower extremity edema, no clubbing or cyanosis  NEUROLOGIC: Alert and oriented x4, moving all extremities with good strength   INTEGUMENTARY:  PSYCHIATRY:        ASSESSMENT/PLAN   Acute hypoxemic respiratory failure   Left-sided effusion status post thoracentesis January 16  Community-acquired pneumonia left lower lobe with complete consolidation collapse  Hyponatremia-chronic?  Thiazide induced?    HX: PBC on ursodiol and cholestyramine, HTN, DDD/osteoarthritis on spinal stimulator, migraine headache, short period (6 years) of smoking in her 30s      Pulmonary following.  Maybe repeat CT somewhere down the line?  [facility] to be consulted for transesophageal echocardiogram.  Follow-up on blood cultures.  Continue with Zosyn, today is day 5.  Plan for a 7 day course.  Noted hyponatremia on thiazide at home    DVT prophylaxis:  Lovenox 40    Anticipated discharge and disposition:   __________________________________________________________________________    LABS/MICRO/MEDS/DIAGNOSTICS       LABS  Recent Labs     01/16/23 0307 01/17/23  0838   * 128*   K 4.1 3.9   CHLORIDE 97* 98   CO2 22* 26   BUN 7.5* 5.1*   CREATININE 0.70 0.73   GLUCOSE 88 95   CALCIUM 8.3* 8.2*   ALKPHOS 71  --    AST 22  --    ALT 15  --    ALBUMIN 2.2*  --      Recent Labs     01/16/23  0307   WBC 8.4   RBC 3.52*   HCT 31.7*   MCV 90.1          MICROBIOLOGY  Microbiology Results (last 7 days)       Procedure Component Value Units Date/Time    Blood Culture [[id number]] Collected: 01/18/23 0747    Order Status: Sent Specimen: Blood Updated: 01/18/23 0837    Blood Culture [[id number]] Collected: 01/18/23 0747    Order  Status: Sent Specimen: Blood Updated: 01/18/23 0837    CHARLES Prep [[id number]] Collected: 01/16/23 0916    Order Status: Completed Specimen: Body Fluid from Pleural, Left Updated: 01/16/23 1017     KOH Prep No fungal elements seen    Mycobacteria and Nocardia Culture [[id number]] Collected: 01/16/23 0917    Order Status: Sent Specimen: Body Fluid from Pleural Fluid, Left Updated: 01/16/23 0917    Fungal Culture [[id number]] Collected: 01/16/23 0916    Order Status: Sent Specimen: Body Fluid from Pleural Fluid, Left Updated: 01/16/23 0916    Respiratory Culture [[id number]] Collected: 01/14/23 1219    Order Status: Completed Specimen: Sputum, Expectorated Updated: 01/16/23 0723     Respiratory Culture Normal respiratory prasanth     GRAM STAIN Quality 1+      Many Gram positive cocci               MEDICATIONS   amLODIPine  10 mg Oral Daily    benzonatate  100 mg Oral TID    calcium-vitamin D3  1 tablet Oral Daily    cholestyramine  1 packet Oral BID    EScitalopram oxalate  5 mg Oral Daily    guaiFENesin  600 mg Oral BID    Lactobacillus acidophilus  1 capsule Oral Daily    multivitamin  1 tablet Oral Daily    piperacillin-tazobactam (ZOSYN) IVPB  4.5 g Intravenous Q8H    umeclidinium-vilanteroL  1 puff Inhalation Daily    ursodioL  500 mg Oral BID    valsartan  160 mg Oral Daily         INFUSIONS         DIAGNOSTIC TESTS  X-Ray Chest 1 View   Final Result      No significant change         Electronically signed by: Joseluis Lewis   Date:    01/17/2023   Time:    08:35      X-Ray Chest 1 View   Final Result      No change since previous         Electronically signed by: Sreekanth Nuno   Date:    01/16/2023   Time:    10:45      X-Ray Chest PA And Lateral   Final Result      Larger left pleural effusion, now moderate to large volume.         Electronically signed by: Aman Thomas   Date:    01/14/2023   Time:    10:32      US Non Rad Thoracentesis with Imaging, Aspiration Only   Final Result      CT Chest With Contrast    Final Result      1. Left lower lung lobe complete collapse consolidation.      2. Moderate volume left pleural effusion and trace of right pleural effusion      3. Chronic appearing compression deformity of T11 vertebral body.         Electronically signed by: Mikie Sanon   Date:    01/13/2023   Time:    22:34      X-Ray Chest 1 View   Final Result      Unchanged small left pleural effusion.         Electronically signed by: Rosa Butler   Date:    01/13/2023   Time:    11:48           EF   Date Value Ref Range Status   01/17/2023 60 % Final              Case related differential diagnoses have been reviewed; assessment and plan has been documented. I have personally reviewed the labs and test results that are currently available; I have reviewed the patients medication list. I have reviewed the consulting providers recommendations. I have reviewed or attempted to review medical records based upon their availability.  All of the patient's and/or family's questions have been addressed and answered to the best of my ability.  I will continue to monitor closely and make adjustments to medical management as needed.  This document was created using M*Modal Fluency Direct.  Transcription errors may have been made.  Please contact me if any questions may rise regarding documentation to clarify transcription.        Manpreet Sofia MD   01/18/2023   Internal Medicine

## 2023-01-18 NOTE — CONSULTS
Inpatient consult to Cardiology  Consult performed by: DOUGIE Simental  Consult ordered by: Gabriele Jefferson Jr., MD, [facility]  Reason for consult: Request THEODORA- Abnormal Surface Echo      [facility] - 5th Floor Med Surg  Cardiology  Consult Note    Patient Name: Purnima Higgins  MRN: [id number]  Admission Date: 2023  Hospital Length of Stay: 5 days  Code Status: Full Code   Attending Provider: Tristan Hodgson MD   Consulting Provider: DOUGIE Simental  Primary Care Physician: Jos Briseno MD  Principal Problem:Pleural effusion on left    Patient information was obtained from patient, past medical records, ER records, and primary team.     Subjective:   Consultation Reason: Request THEODORA    HPI:   Ms. Higgins is a 82 year old female, known to Dr. Smith, who presented to the hospital with cough. She reported lower extremity edema and chest radiograph revealed left pleural effusion. She underwent thoracentesis on 23 removing 300 MLS of fluid. Echocardiogram on 23 revealed normal LV Function with Grade I Diastolic Dysfunction. There was s small mobile echogenic structure on the aortic valve with some calcification. [facility] is consulted for THEODORA to get better visualization of reported echogenic structure on surface echo.     PMH: Hypertension, Hyperlipidemia, Biliary Cirrhosis, History of DVT, Obesity, Back Pain with Implanted Nerve Stimulator  PSH: Back Stimulator Implant, Neck/Knee/Back/Hand Surgeries, , Cholecystectomy  Family History: Mother- Cancer  Social History: Tobacco- Former Smoker, Alcohol- Beer/Whiskey, Substance Abuse- Negative    Previous Cardiac Diagnostics:   Echocardiogram (23):  Concentric hypertrophy and normal systolic function.  The estimated ejection fraction is 60%.  Normal right ventricular systolic function.  Grade I left ventricular diastolic dysfunction.  The estimated PA systolic pressure is 21 mmHg.  Small echogenic mobile structure on aortic leaflet  likely is calcification. A small calcified vegetation can not be ruled out. If clinically indicated consider THEODORA.    Carotid US (7.20.22):  The study quality is below average.   1-39% stenosis in the proximal right internal carotid artery based on Bluth Criteria.   1-39% stenosis in the proximal left internal carotid artery based on Bluth Criteria.   Antegrade right vertebral artery flow. The left vertebral artery is poorly visualized.     Cardiac PET (12.29.16):  This is a normal perfusion study, no perfusion defects noted.   This scan is suggestive of low risk for future cardiovascular events.   The left ventricular cavity is noted to be normal on the stress studies. The stress left ventricular ejection fraction was calculated to be 66% and left ventricular global function is normal. The rest left ventricular cavity is noted to be normal. The rest left ventricular ejection fraction was calculated to be 63% and rest left ventricular global function is normal.   When compared to the resting ejection fraction (63%), the stress ejection fraction (66%) has increased.   The study quality is excellent.     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 NOAH Gonzáles    Mucinex [guaifenesin] Rash       No current facility-administered medications on file prior to encounter.     Current Outpatient Medications on File Prior to Encounter   Medication Sig    valsartan (DIOVAN) 160 MG tablet Take 1 tablet (160 mg total) by mouth once daily.    albuterol (VENTOLIN HFA) 90 mcg/actuation inhaler Inhale 2 puffs into the lungs every 4 (four) hours as needed for Wheezing. Disp 1 inhaler    amitriptyline (ELAVIL) 25 MG tablet Take 1-2 po qHS PRN headache    amLODIPine (NORVASC) 10 MG tablet Take 1 tablet (10 mg total) by mouth once daily.    aspirin (ECOTRIN) 81 MG EC tablet Take 81 mg by mouth.    calcium-vitamin D3 (OS-BIBIANA 500 + D3) 500  mg-5 mcg (200 unit) per tablet Take 1 tablet by mouth once daily.    cholestyramine (QUESTRAN) 4 gram packet TAKE 1 PACKET TWICE A DAY    coenzyme Q10 100 mg capsule Take 100 mg by mouth.    EScitalopram oxalate (LEXAPRO) 5 MG Tab Take 5 mg by mouth once daily.    guaiFENesin-codeine 100-10 mg/5 ml (TUSSI-ORGANIDIN NR)  mg/5 mL syrup 5-10mL po q4h prn cough    ibandronate (BONIVA) 150 mg tablet Take 1 tablet (150 mg total) by mouth every 30 days. for 12 doses    L.acidophilus-Bifido.longum 1 billion cell CpDR Take 1 capsule by mouth once daily.    levoFLOXacin (LEVAQUIN) 500 MG tablet Take 1.5 tablets (750 mg total) by mouth once daily.    multivitamin (THERAGRAN) per tablet Take 1 tablet by mouth once daily.    oxybutynin (DITROPAN-XL) 10 MG 24 hr tablet Take 1 tablet (10 mg total) by mouth once daily.    umeclidinium-vilanteroL (ANORO ELLIPTA) 62.5-25 mcg/actuation DsDv Inhale 1 puff into the lungs Daily. Controller    ursodioL (ACTIGALL) 250 mg Tab Take by mouth.     Review of Systems   Respiratory:  Positive for cough. Negative for chest tightness and shortness of breath.    Cardiovascular:  Negative for chest pain.   All other systems reviewed and are negative.    Objective:     Vital Signs (Most Recent):  Temp: 98 °F (36.7 °C) (01/18/23 0804)  Pulse: 68 (01/18/23 0804)  Resp: 17 (01/18/23 0830)  BP: 128/78 (01/18/23 0804)  SpO2: 96 % (01/18/23 0804) Vital Signs (24h Range):  Temp:  [63.4 °F (17.4 °C)-98.4 °F (36.9 °C)] 98 °F (36.7 °C)  Pulse:  [65-76] 68  Resp:  [16-18] 17  SpO2:  [94 %-97 %] 96 %  BP: (126-155)/(71-78) 128/78     Weight: 79.4 kg (175 lb 0.7 oz)  Body mass index is 36.58 kg/m².    SpO2: 96 %         Intake/Output Summary (Last 24 hours) at 1/18/2023 0856  Last data filed at 1/17/2023 2100  Gross per 24 hour   Intake 540 ml   Output 1300 ml   Net -760 ml       Lines/Drains/Airways       Peripheral Intravenous Line  Duration                  Peripheral IV - Single Lumen 01/13/23 2007 20 G  Anterior;Distal;Left Antecubital 4 days                    Significant Labs:  Recent Results (from the past 72 hour(s))   Comprehensive Metabolic Panel    Collection Time: 01/16/23  3:07 AM   Result Value Ref Range    Sodium Level 127 (L) 136 - 145 mmol/L    Potassium Level 4.1 3.5 - 5.1 mmol/L    Chloride 97 (L) 98 - 107 mmol/L    Carbon Dioxide 22 (L) 23 - 31 mmol/L    Glucose Level 88 82 - 115 mg/dL    Blood Urea Nitrogen 7.5 (L) 9.8 - 20.1 mg/dL    Creatinine 0.70 0.55 - 1.02 mg/dL    Calcium Level Total 8.3 (L) 8.4 - 10.2 mg/dL    Protein Total 5.8 5.8 - 7.6 gm/dL    Albumin Level 2.2 (L) 3.4 - 4.8 g/dL    Globulin 3.6 (H) 2.4 - 3.5 gm/dL    Albumin/Globulin Ratio 0.6 (L) 1.1 - 2.0 ratio    Bilirubin Total 0.6 <=1.5 mg/dL    Alkaline Phosphatase 71 40 - 150 unit/L    Alanine Aminotransferase 15 0 - 55 unit/L    Aspartate Aminotransferase 22 5 - 34 unit/L    eGFR >60 mls/min/1.73/m2   CBC with Differential    Collection Time: 01/16/23  3:07 AM   Result Value Ref Range    WBC 8.4 4.5 - 11.5 x10(3)/mcL    RBC 3.52 (L) 4.20 - 5.40 x10(6)/mcL    Hgb 10.8 (L) 12.0 - 16.0 gm/dL    Hct 31.7 (L) 37.0 - 47.0 %    MCV 90.1 80.0 - 94.0 fL    MCH 30.7 pg    MCHC 34.1 33.0 - 36.0 mg/dL    RDW 12.7 11.5 - 17.0 %    Platelet 144 130 - 400 x10(3)/mcL    MPV 9.8 7.4 - 10.4 fL    Neut % 56.8 %    Lymph % 20.0 %    Mono % 12.4 %    Eos % 9.2 %    Basophil % 1.0 %    Lymph # 1.67 0.6 - 4.6 x10(3)/mcL    Neut # 4.75 2.1 - 9.2 x10(3)/mcL    Mono # 1.04 0.1 - 1.3 x10(3)/mcL    Eos # 0.77 0 - 0.9 x10(3)/mcL    Baso # 0.08 0 - 0.2 x10(3)/mcL    IG# 0.05 (H) 0 - 0.04 x10(3)/mcL    IG% 0.6 %    NRBC% 0.0 %   Magnesium    Collection Time: 01/16/23  3:07 AM   Result Value Ref Range    Magnesium Level 1.60 1.60 - 2.60 mg/dL   TSH    Collection Time: 01/16/23  3:07 AM   Result Value Ref Range    Thyroid Stimulating Hormone 3.383 0.350 - 4.940 uIU/mL   Cytology, Fluid/Wash/Brush    Collection Time: 01/16/23  8:55 AM   Result Value Ref Range     "Case Report       [facility]                              Case: [id number]                                   Authorizing Provider:  Gabriele Jefferson Jr., MD,    Collected:           01/16/2023 08:55 AM                                 [facility]                                                                         Ordering Location:     [facility]  Received:            01/16/2023 09:34 AM                                 - 5th Floor Med Surg                                                         Pathologist:           Lexa Kraft MD                                                            Specimen:    Pleural Fluid, Left, LEFT PLEURAL FLUID                                                    Final Diagnosis       LEFT PLEURAL FLUID:    BLOODY FLUID WITH NUMEROUS HISTIOCYTES AND REACTIVE MESOTHELIAL CELLS.    CODE 2      Specimen 1 Interpretation See Comment Unsatisfactory , Negative for malignant cells, Negative for malignant cells - benign bronchial cells, Negative for malignant cells - benign ductal cells, Negative for malignant cells - benign epithelial cells, Negative for malignant cells - benign mes...    Clinical Information       left pleural effusion      Specimen 1 Adequacy Satisfactory for evaluation     Gross Description       1. Pleural Fluid, Left, LEFT PLEURAL FLUID:   750, Purnima Ronaldo, "Left Pleural Fluid". Labeled on the container with the patient's name "Left Pleural Fluid, 750." Received fresh are approximately 250 cc of turbid red fluid submitted for cell block in cassettes 750-1A and thin prep.    rj/bb      Microscopic Description       Microscopic examination performed.  Please see diagnosis.     CHARLES Prep    Collection Time: 01/16/23  9:16 AM    Specimen: Pleural, Left; Body Fluid   Result Value Ref Range    KOH Prep No fungal elements seen    Cell Count, Body Fluid    Collection Time: 01/16/23  9:17 AM   Result Value Ref Range    WBC  /uL    Color BF Red     " Clarity BF Cloudy    Glucose, Body Fluid    Collection Time: 01/16/23  9:17 AM   Result Value Ref Range    Glucose Body Fluid 101 mg/dL   Protein, Body Fluid    Collection Time: 01/16/23  9:17 AM   Result Value Ref Range    Protein Body Fluid 2.8 gm/dL   LD, Body Fluid    Collection Time: 01/16/23  9:17 AM   Result Value Ref Range    Lactate Dehydrogenase Body Fluid 109 U/L   Differential, Body Fluid    Collection Time: 01/16/23  9:17 AM   Result Value Ref Range    Neutrophils % 53 %    Lymphocyte % 44 %    Monocyte % 1 %    Eosinophil 2 %   Acid Fast Smear    Collection Time: 01/16/23  9:17 AM   Result Value Ref Range    Acid Fast Smear For Mycobacterium SEE COMMENTS    Basic Metabolic Panel    Collection Time: 01/17/23  8:38 AM   Result Value Ref Range    Sodium Level 128 (L) 136 - 145 mmol/L    Potassium Level 3.9 3.5 - 5.1 mmol/L    Chloride 98 98 - 107 mmol/L    Carbon Dioxide 26 23 - 31 mmol/L    Glucose Level 95 82 - 115 mg/dL    Blood Urea Nitrogen 5.1 (L) 9.8 - 20.1 mg/dL    Creatinine 0.73 0.55 - 1.02 mg/dL    BUN/Creatinine Ratio 7     Calcium Level Total 8.2 (L) 8.4 - 10.2 mg/dL    Anion Gap 4.0 mEq/L    eGFR >60 mls/min/1.73/m2   Magnesium    Collection Time: 01/17/23  8:38 AM   Result Value Ref Range    Magnesium Level 1.60 1.60 - 2.60 mg/dL   Echo    Collection Time: 01/17/23 11:53 AM   Result Value Ref Range    BSA 1.8 m2    TDI SEPTAL 0.06 m/s    LV LATERAL E/E' RATIO 6.90 m/s    LV SEPTAL E/E' RATIO 11.50 m/s    Right Atrial Pressure (from IVC) 3 mmHg    EF 60 %    Left Ventricular Outflow Tract Mean Velocity 0.78 cm/s    Left Ventricular Outflow Tract Mean Gradient 3.00 mmHg    TDI LATERAL 0.10 m/s    PV PEAK VELOCITY 1.24 cm/s    LVIDd 4.19 3.5 - 6.0 cm    IVS 1.07 0.6 - 1.1 cm    Posterior Wall 1.23 (A) 0.6 - 1.1 cm    LVIDs 2.93 2.1 - 4.0 cm    FS 30 28 - 44 %    LV mass 166.83 g    LA size 4.10 cm    TAPSE 2.11 cm    Left Ventricle Relative Wall Thickness 0.59 cm    AV mean gradient 8 mmHg     AV valve area 1.66 cm2    AV Velocity Ratio 0.56     AV index (prosthetic) 0.58     MV mean gradient 2 mmHg    MV valve area p 1/2 method 1.95 cm2    MV valve area by continuity eq 1.89 cm2    E/A ratio 0.96     Mean e' 0.08 m/s    E wave deceleration time 201.00 msec    LVOT diameter 1.90 cm    LVOT area 2.8 cm2    LVOT peak donnell 1.10 m/s    LVOT peak VTI 23.90 cm    Ao peak donnell 1.95 m/s    Ao VTI 40.9 cm    LVOT stroke volume 67.73 cm3    AV peak gradient 15 mmHg    MV peak gradient 5 mmHg    TV rest pulmonary artery pressure 21 mmHg    E/E' ratio 8.63 m/s    MV Peak E Donnell 0.69 m/s    TR Max Donnell 2.10 m/s    MV VTI 35.8 cm    MV stenosis pressure 1/2 time 113.00 ms    MV Peak A Donnell 0.72 m/s    LV Systolic Volume 33.00 mL    LV Systolic Volume Index 19.2 mL/m2    LV Diastolic Volume 78.10 mL    LV Diastolic Volume Index 45.41 mL/m2    LV Mass Index 97 g/m2    Triscuspid Valve Regurgitation Peak Gradient 18 mmHg    LA Volume Index (Mod) 34.7 mL/m2    LA volume (mod) 59.70 cm3   Osmolality, Serum    Collection Time: 01/17/23 12:02 PM   Result Value Ref Range    Osmolality 272 (L) 280 - 300 mOsm/kg   Osmolality, Urine    Collection Time: 01/17/23  1:24 PM   Result Value Ref Range    Urine Osmolality 287 (L) 300 - 1,300 mOsm/kg   Creatinine, Random Urine    Collection Time: 01/17/23  1:24 PM   Result Value Ref Range    Urine Creatinine 58.5 47.0 - 110.0 mg/dL   Sodium, Random Urine    Collection Time: 01/17/23  1:24 PM   Result Value Ref Range    Urine Sodium 64.0 mmol/L   Urea Nitrogen, Random Urine    Collection Time: 01/17/23  1:24 PM   Result Value Ref Range    Urine Urea Nitrogen 185.0 mg/dL       Significant Imaging:  Imaging Results              X-Ray Chest PA And Lateral (Final result)  Result time 01/14/23 10:32:47      Final result by Aman Thomas MD (01/14/23 10:32:47)                   Impression:      Larger left pleural effusion, now moderate to large volume.      Electronically signed by: Aman  Thomas  Date:    01/14/2023  Time:    10:32               Narrative:    EXAMINATION:  XR CHEST PA AND LATERAL    CLINICAL HISTORY:  left effusion;  Other specified disorders of bone density and structure, unspecified site    TECHNIQUE:  PA and lateral radiographs of the chest    COMPARISON:  Radiography 01/13/2023    FINDINGS:  Increased volume of left pleural fluid, now moderate to large.  No consolidation or significant pleural fluid identified on the right.  No pneumothorax.                                       US Non Rad Thoracentesis with Imaging, Aspiration Only (Final result)  Result time 01/14/23 09:16:30   Procedure changed from US Thoracentesis with Imaging, Aspiration Only     Final result by Access, Silent  (01/14/23 09:16:30)                   Narrative:    See Provider Notes for results.     IMPRESSION: Please see provider notes for interpretation          This procedure was auto-finalized by: Virtual Radiologist                                     CT Chest With Contrast (Final result)  Result time 01/13/23 22:34:06      Final result by Mikie Sanon MD (01/13/23 22:34:06)                   Impression:      1. Left lower lung lobe complete collapse consolidation.    2. Moderate volume left pleural effusion and trace of right pleural effusion    3. Chronic appearing compression deformity of T11 vertebral body.      Electronically signed by: Mikie Sanon  Date:    01/13/2023  Time:    22:34               Narrative:    EXAMINATION:  CT CHEST WITH CONTRAST    CLINICAL HISTORY:  Pneumonia, unresolved;Pleural effusion, malignancy suspected;    TECHNIQUE:  Multidetector axial images were performed from thoracic inlet to below hemidiaphragms following intravenous contrast administration. Sagittal and coronal reformations performed.    Dose length product of 508 mGycm. Automated exposure control was utilized to minimize radiation dose.    COMPARISON:  Chest radiograph January 13,  2023    FINDINGS:  There is moderate volume left pleural effusion.  Left lower lung lobe complete collapse consolidation.  The left upper lung lobe is clear of acute infiltrates.  There is trace of right pleural effusion.  Right lower lung zone subpleural  atelectasis without exclusion of mild infiltrates.  There is no hazy pneumonitis or pulmonary edema.  No cystic formation or bronchiectasis.    Chest lymph nodes are not enlarged in size.  Thoracic aorta is without aneurysmal dilatation or dissection.  Cardiac chambers are enlarged in size.    There is mild dilatation distal esophagus with some fluid which may be secondary to reflux disease.  Stomach is entirely decompressed.  Adrenals are not enlarged in size.  Cholecystectomy clips.  Spinal neuro stimulator electrodes.  Posterior cervicothoracic fusions with transpedicular screws and bre constructs.  There is chronic appearing compression deformity of L1 vertebral body with some retropulsed bony fragment into the spinal canal.                                       X-Ray Chest 1 View (Final result)  Result time 01/13/23 11:48:46      Final result by Rosa Butler MD (01/13/23 11:48:46)                   Impression:      Unchanged small left pleural effusion.      Electronically signed by: Rosa Butler  Date:    01/13/2023  Time:    11:48               Narrative:    EXAMINATION:  XR CHEST 1 VIEW    CLINICAL HISTORY:  sob;    TECHNIQUE:  AP chest    COMPARISON:  Chest x-ray dated 01/13/2023    FINDINGS:  The heart is normal in size.  There is a similar small left pleural effusion.  The right lung is clear.  There is no visible pneumothorax.                                    Physical Exam  Vitals reviewed.   Constitutional:       Appearance: Normal appearance.   HENT:      Head: Normocephalic.      Mouth/Throat:      Mouth: Mucous membranes are moist.      Pharynx: Oropharynx is clear.   Cardiovascular:      Rate and Rhythm: Normal rate and regular  rhythm.      Pulses: Normal pulses.      Heart sounds: Normal heart sounds. No murmur heard.  Pulmonary:      Effort: Pulmonary effort is normal. No respiratory distress.      Breath sounds: Normal breath sounds.   Abdominal:      General: There is no distension.      Palpations: Abdomen is soft.      Tenderness: There is no abdominal tenderness.   Musculoskeletal:         General: Normal range of motion.      Cervical back: Neck supple.   Skin:     General: Skin is warm and dry.   Neurological:      General: No focal deficit present.      Mental Status: She is alert and oriented to person, place, and time. Mental status is at baseline.   Psychiatric:         Mood and Affect: Mood normal.         Behavior: Behavior normal.         Judgment: Judgment normal.     Home Medications:   No current facility-administered medications on file prior to encounter.     Current Outpatient Medications on File Prior to Encounter   Medication Sig Dispense Refill    valsartan (DIOVAN) 160 MG tablet Take 1 tablet (160 mg total) by mouth once daily. 30 tablet 2    albuterol (VENTOLIN HFA) 90 mcg/actuation inhaler Inhale 2 puffs into the lungs every 4 (four) hours as needed for Wheezing. Disp 1 inhaler 18 g 1    amitriptyline (ELAVIL) 25 MG tablet Take 1-2 po qHS PRN headache 60 tablet 1    amLODIPine (NORVASC) 10 MG tablet Take 1 tablet (10 mg total) by mouth once daily. 90 tablet 1    aspirin (ECOTRIN) 81 MG EC tablet Take 81 mg by mouth.      calcium-vitamin D3 (OS-BIBIANA 500 + D3) 500 mg-5 mcg (200 unit) per tablet Take 1 tablet by mouth once daily.      cholestyramine (QUESTRAN) 4 gram packet TAKE 1 PACKET TWICE A  packet 3    coenzyme Q10 100 mg capsule Take 100 mg by mouth.      EScitalopram oxalate (LEXAPRO) 5 MG Tab Take 5 mg by mouth once daily.      guaiFENesin-codeine 100-10 mg/5 ml (TUSSI-ORGANIDIN NR)  mg/5 mL syrup 5-10mL po q4h prn cough 240 mL 0    ibandronate (BONIVA) 150 mg tablet Take 1 tablet (150 mg  total) by mouth every 30 days. for 12 doses 3 tablet 3    L.acidophilus-Bifido.longum 1 billion cell CpDR Take 1 capsule by mouth once daily.      levoFLOXacin (LEVAQUIN) 500 MG tablet Take 1.5 tablets (750 mg total) by mouth once daily. 7 tablet 0    multivitamin (THERAGRAN) per tablet Take 1 tablet by mouth once daily.      oxybutynin (DITROPAN-XL) 10 MG 24 hr tablet Take 1 tablet (10 mg total) by mouth once daily. 30 tablet 5    umeclidinium-vilanteroL (ANORO ELLIPTA) 62.5-25 mcg/actuation DsDv Inhale 1 puff into the lungs Daily. Controller      ursodioL (ACTIGALL) 250 mg Tab Take by mouth.         Current Inpatient Medications:    Current Facility-Administered Medications:     albuterol-ipratropium 2.5 mg-0.5 mg/3 mL nebulizer solution 3 mL, 3 mL, Nebulization, Q4H PRN, Yefri Herrera MD    albuterol-ipratropium 2.5 mg-0.5 mg/3 mL nebulizer solution 3 mL, 3 mL, Nebulization, Q6H PRN, Aurora Kaufman, DOUGIE    aluminum-magnesium hydroxide-simethicone 200-200-20 mg/5 mL suspension 30 mL, 30 mL, Oral, QID PRN, Yefri Herrera MD    amitriptyline tablet 25 mg, 25 mg, Oral, Nightly PRN, Yefri Herrera MD    amLODIPine tablet 10 mg, 10 mg, Oral, Daily, Yefri Herrera MD, 10 mg at 01/18/23 0833    benzonatate capsule 100 mg, 100 mg, Oral, TID, Tristan Hodgson MD, 100 mg at 01/18/23 0831    calcium-vitamin D3 500 mg-5 mcg (200 unit) per tablet 1 tablet, 1 tablet, Oral, Daily, Yefri Herrera MD, 1 tablet at 01/18/23 0831    cholestyramine 4 gram packet 4 g, 1 packet, Oral, BID, Yefri Herrera MD, 4 g at 01/18/23 0839    dextromethorphan-guaiFENesin  mg/5 ml liquid 5 mL, 5 mL, Oral, Q4H PRN, Yefri Herrera MD    EScitalopram oxalate tablet 5 mg, 5 mg, Oral, Daily, Yefri Herrera MD, 5 mg at 01/18/23 0839    guaiFENesin 12 hr tablet 600 mg, 600 mg, Oral, BID, Yefri Herrera MD, 600 mg at 01/15/23 1033    Lactobacillus acidophilus capsule 1 capsule, 1 capsule, Oral, Daily, Yefri Herrera MD, 1 capsule at 01/18/23 0831    melatonin tablet  6 mg, 6 mg, Oral, Nightly PRN, Yefri Herrera MD    multivitamin tablet, 1 tablet, Oral, Daily, Yefri Herrera MD, 1 tablet at 01/18/23 0831    ondansetron injection 4 mg, 4 mg, Intravenous, Q4H PRN, Yefri Herrera MD    oxybutynin tablet 5 mg, 5 mg, Oral, BID, Yefri Herrera MD, 5 mg at 01/18/23 0831    piperacillin-tazobactam (ZOSYN) 4.5 g in dextrose 5 % in water (D5W) 5 % 100 mL IVPB (MB+), 4.5 g, Intravenous, Q8H, Teo Mason MD, Stopped at 01/18/23 0714    polyethylene glycol packet 17 g, 17 g, Oral, BID PRN, Yefri Herrera MD    prochlorperazine injection Soln 5 mg, 5 mg, Intravenous, Q6H PRN, Yefri Herrera MD    senna-docusate 8.6-50 mg per tablet 2 tablet, 2 tablet, Oral, BID PRN, Yefri Herrera MD    simethicone chewable tablet 80 mg, 1 tablet, Oral, QID PRN, Yefri Herrera MD    sodium chloride 0.9% flush 10 mL, 10 mL, Intravenous, PRN, Yefri Herrera MD    traMADoL tablet 50 mg, 50 mg, Oral, Q6H PRN, Tristan Hodgson MD, 50 mg at 01/18/23 0830    umeclidinium-vilanteroL 62.5-25 mcg/actuation DsDv 1 puff, 1 puff, Inhalation, Daily, Yefri Herrera MD, 1 puff at 01/17/23 1601    ursodioL tablet 500 mg, 500 mg, Oral, BID, Yefri Herrera MD, 500 mg at 01/18/23 0839    valsartan tablet 160 mg, 160 mg, Oral, Daily, Yefri Herrera MD, 160 mg at 01/18/23 0833         VTE Risk Mitigation (From admission, onward)           Ordered     IP VTE HIGH RISK PATIENT  Once         01/14/23 0603     Place sequential compression device  Until discontinued         01/14/23 0603                  Assessment:   Aortic Valve Echogenic Structure/Density on Transthoracic Echocardiogram (1.17.23)  Acute Hypoxemic Respiratory Failure (Improved)  Pleural Effusion (Left) Status Post Thoracentesis with ~ 300 ML Fluid Removed  Hyponatremia  Hypertension  Hyperlipidemia  History of DVT  Primary Biliary Cirrhosis  History of Atrial Septal Aneurysm  Chronic Back Pain with Nerve Stimulator  Anemia  COVID-19/Influenza Negative on 1.14.23    Plan:   NPO Past  Midnight  Plan THEODORA Tomorrow Re: Assess AOV Rule out IE  Risks/Benefits/Alternatives of the THEODORA Discussed with the patient. She Elects to Proceed. Consent signed and on chart.    Thank you for your consult.     DOUGIE Simental  Cardiology  [facility] - 5th Floor Med Surg  01/18/2023 8:56 AM

## 2023-01-18 NOTE — PT/OT/SLP PROGRESS
Physical Therapy Treatment    Patient Name:  Purnima Higgins   MRN:  [id number]    Recommendations:     Discharge Recommendations: home, home with home health, home health PT  Discharge Equipment Recommendations: none  Barriers to discharge: None    Assessment:     Purnima Higgins is a 82 y.o. female admitted with a medical diagnosis of Pleural effusion on left.  She presents with the following impairments/functional limitations: impaired endurance, impaired functional mobility Pt did walk a greater distance today with no o2 and no SOB.    Rehab Prognosis: Good; patient would benefit from acute skilled PT services to address these deficits and reach maximum level of function.    Recent Surgery: * No surgery found *      Plan:     During this hospitalization, patient to be seen 6 x/week to address the identified rehab impairments via gait training, therapeutic activities and progress toward the following goals:    Plan of Care Expires:       Subjective     Chief Complaint: none  Patient/Family Comments/goals: to cont building her endurance  Pain/Comfort:  Pain Rating 1: 0/10      Objective:     Communicated with nurse prior to session.  Patient found up in chair with   upon PT entry to room.     General Precautions: Standard, fall  Orthopedic Precautions:    Braces:    Respiratory Status: Room air     Functional Mobility:  Transfers:     Sit to Stand:  stand by assistance with rolling walker  Bed to Chair: stand by assistance with  rolling walker  using  Step Transfer  Gait: amb 100ft with rw without o2 with sba      AM-PAC 6 CLICK MOBILITY          Treatment & Education:    Patient left  up in chairwith call button in reach..    GOALS:   Multidisciplinary Problems       Physical Therapy Goals          Problem: Physical Therapy    Goal Priority Disciplines Outcome Goal Variances Interventions   Physical Therapy Goal     PT, PT/OT Ongoing, Progressing     Description: Goals to be met by: 2/17/2023     Patient will  increase functional independence with mobility by performin. Supine to sit with [place]  2. Sit to stand transfer with Modified [place]  3. Gait  x 200 feet with Modified [place] using Rolling Walker.                          Time Tracking:     PT Received On:    PT Start Time: 1100     PT Stop Time: 1121  PT Total Time (min): 21 min     Billable Minutes: Gait Training 21    Treatment Type: Treatment  PT/PTA: PT     PTA Visit Number: 0     2023

## 2023-01-18 NOTE — PT/OT/SLP PROGRESS
Occupational Therapy  Treatment    Purnima Higgins   MRN: [id number]   Admitting Diagnosis: Pleural effusion on left    OT Date of Treatment: 01/18/23   OT Start Time: 1000  OT Stop Time: 1029  OT Total Time (min): 29 min     Billable Minutes:  Self Care/Home Management 2  Total Minutes: 29           BOB Visit Number: 2    General Precautions: Standard, fall  Orthopedic Precautions:    Braces:      Spiritual, Cultural Beliefs, Latter day Practices, Values that Affect Care: no    Subjective:  Communicated with RN prior to session.  Room Air 97%    Objective:  (Bed Mobility-Min A)  (Sitting balance-SBA)  (Sit to stand-Min A)  Pt. Ambulating from EOB to bathroom using RW for UE support with balance with Min A requiring assistance for safe descend onto low surface during toilet t/f. Pt. Requiring Mod A for pericare in supported stance and max A for brief management.   Pt. Standing at sink with Min A-CGA for standing balance while performing grooming task (brushing teeth) with appropriate preparation noted.       Patient left up in chair with all lines intact and call button in reach    ASSESSMENT:  Purnima Higgins is a 82 y.o. female with a medical diagnosis of Pleural effusion on left Pt. Tolerated session well overall Min A with RW for mobility during ADL activities. Continue POC    Rehab potential is good    Activity tolerance: Good    Discharge recommendations: home with home health     Equipment recommendations:       GOALS:   Multidisciplinary Problems       Occupational Therapy Goals          Problem: Occupational Therapy    Goal Priority Disciplines Outcome Interventions   Occupational Therapy Goal     OT, PT/OT Ongoing, Progressing    Description: Goals to be met by: 1/30     Patient will increase functional independence with ADLs by performing:    LE Dressing with Modified [place].  Grooming while standing at sink with Modified [place].  Toileting from toilet with Modified [place] for hygiene and  clothing management.   Bathing from  shower chair/bench with Modified [place].  Toilet transfer to toilet with Modified [place].                         Plan:  Patient to be seen 3 x/week, 5 x/week to address the above listed problems via self-care/home management, therapeutic activities, therapeutic exercises  Plan of Care expires: 01/30/23  Plan of Care reviewed with: patient         01/18/2023

## 2023-01-19 ENCOUNTER — ANESTHESIA (OUTPATIENT)
Dept: CARDIOLOGY | Facility: HOSPITAL | Age: 83
DRG: 166 | End: 2023-01-19
Payer: MEDICARE

## 2023-01-19 ENCOUNTER — ANESTHESIA EVENT (OUTPATIENT)
Dept: CARDIOLOGY | Facility: HOSPITAL | Age: 83
DRG: 166 | End: 2023-01-19
Payer: MEDICARE

## 2023-01-19 LAB
ANION GAP SERPL CALC-SCNC: 6 MEQ/L
BASOPHILS # BLD AUTO: 0.07 X10(3)/MCL (ref 0–0.2)
BASOPHILS NFR BLD AUTO: 1 %
BSA FOR ECHO PROCEDURE: 1.8 M2
BUN SERPL-MCNC: 5.4 MG/DL (ref 9.8–20.1)
CALCIUM SERPL-MCNC: 8 MG/DL (ref 8.4–10.2)
CHLORIDE SERPL-SCNC: 97 MMOL/L (ref 98–107)
CO2 SERPL-SCNC: 25 MMOL/L (ref 23–31)
CREAT SERPL-MCNC: 0.68 MG/DL (ref 0.55–1.02)
CREAT/UREA NIT SERPL: 8
EOSINOPHIL # BLD AUTO: 0.97 X10(3)/MCL (ref 0–0.9)
EOSINOPHIL NFR BLD AUTO: 13.2 %
ERYTHROCYTE [DISTWIDTH] IN BLOOD BY AUTOMATED COUNT: 13 % (ref 11.5–17)
GFR SERPLBLD CREATININE-BSD FMLA CKD-EPI: >60 MLS/MIN/1.73/M2
GLUCOSE SERPL-MCNC: 90 MG/DL (ref 82–115)
HCT VFR BLD AUTO: 30.5 % (ref 37–47)
HGB BLD-MCNC: 10.4 GM/DL (ref 12–16)
IMM GRANULOCYTES # BLD AUTO: 0.04 X10(3)/MCL (ref 0–0.04)
IMM GRANULOCYTES NFR BLD AUTO: 0.5 %
LYMPHOCYTES # BLD AUTO: 1.35 X10(3)/MCL (ref 0.6–4.6)
LYMPHOCYTES NFR BLD AUTO: 18.3 %
MCH RBC QN AUTO: 30.6 PG
MCHC RBC AUTO-ENTMCNC: 34.1 MG/DL (ref 33–36)
MCV RBC AUTO: 89.7 FL (ref 80–94)
MONOCYTES # BLD AUTO: 0.88 X10(3)/MCL (ref 0.1–1.3)
MONOCYTES NFR BLD AUTO: 12 %
NEUTROPHILS # BLD AUTO: 4.05 X10(3)/MCL (ref 2.1–9.2)
NEUTROPHILS NFR BLD AUTO: 55 %
NRBC BLD AUTO-RTO: 0 %
OSMOLALITY SERPL: 269 MOSM/KG (ref 280–300)
OSMOLALITY SERPL: 270 MOSM/KG (ref 280–300)
PLATELET # BLD AUTO: 155 X10(3)/MCL (ref 130–400)
PMV BLD AUTO: 9.5 FL (ref 7.4–10.4)
POTASSIUM SERPL-SCNC: 3.8 MMOL/L (ref 3.5–5.1)
RBC # BLD AUTO: 3.4 X10(6)/MCL (ref 4.2–5.4)
SODIUM SERPL-SCNC: 128 MMOL/L (ref 136–145)
SODIUM SERPL-SCNC: 129 MMOL/L (ref 136–145)
WBC # SPEC AUTO: 7.4 X10(3)/MCL (ref 4.5–11.5)

## 2023-01-19 PROCEDURE — 63600175 PHARM REV CODE 636 W HCPCS: Performed by: STUDENT IN AN ORGANIZED HEALTH CARE EDUCATION/TRAINING PROGRAM

## 2023-01-19 PROCEDURE — 63600175 PHARM REV CODE 636 W HCPCS: Performed by: INTERNAL MEDICINE

## 2023-01-19 PROCEDURE — 80048 BASIC METABOLIC PNL TOTAL CA: CPT | Performed by: INTERNAL MEDICINE

## 2023-01-19 PROCEDURE — 83930 ASSAY OF BLOOD OSMOLALITY: CPT | Performed by: INTERNAL MEDICINE

## 2023-01-19 PROCEDURE — 27000221 HC OXYGEN, UP TO 24 HOURS

## 2023-01-19 PROCEDURE — 25000003 PHARM REV CODE 250: Performed by: INTERNAL MEDICINE

## 2023-01-19 PROCEDURE — 25000003 PHARM REV CODE 250: Performed by: STUDENT IN AN ORGANIZED HEALTH CARE EDUCATION/TRAINING PROGRAM

## 2023-01-19 PROCEDURE — 85025 COMPLETE CBC W/AUTO DIFF WBC: CPT | Performed by: INTERNAL MEDICINE

## 2023-01-19 PROCEDURE — 84295 ASSAY OF SERUM SODIUM: CPT | Performed by: INTERNAL MEDICINE

## 2023-01-19 PROCEDURE — 36415 COLL VENOUS BLD VENIPUNCTURE: CPT | Performed by: INTERNAL MEDICINE

## 2023-01-19 PROCEDURE — 25000242 PHARM REV CODE 250 ALT 637 W/ HCPCS: Performed by: INTERNAL MEDICINE

## 2023-01-19 PROCEDURE — 97116 GAIT TRAINING THERAPY: CPT

## 2023-01-19 PROCEDURE — 97535 SELF CARE MNGMENT TRAINING: CPT | Mod: CO

## 2023-01-19 PROCEDURE — 11000001 HC ACUTE MED/SURG PRIVATE ROOM

## 2023-01-19 RX ORDER — PROPOFOL 10 MG/ML
VIAL (ML) INTRAVENOUS
Status: DISCONTINUED | OUTPATIENT
Start: 2023-01-19 | End: 2023-01-19

## 2023-01-19 RX ORDER — LIDOCAINE HYDROCHLORIDE 10 MG/ML
1 INJECTION, SOLUTION EPIDURAL; INFILTRATION; INTRACAUDAL; PERINEURAL ONCE
Status: CANCELLED | OUTPATIENT
Start: 2023-01-19 | End: 2023-01-19

## 2023-01-19 RX ORDER — ONDANSETRON 2 MG/ML
4 INJECTION INTRAMUSCULAR; INTRAVENOUS ONCE AS NEEDED
Status: CANCELLED | OUTPATIENT
Start: 2023-01-19 | End: 2034-06-17

## 2023-01-19 RX ORDER — LIDOCAINE HYDROCHLORIDE 20 MG/ML
INJECTION INTRAVENOUS
Status: DISCONTINUED | OUTPATIENT
Start: 2023-01-19 | End: 2023-01-19

## 2023-01-19 RX ORDER — HEPARIN SODIUM 5000 [USP'U]/ML
5000 INJECTION, SOLUTION INTRAVENOUS; SUBCUTANEOUS EVERY 12 HOURS
Status: DISCONTINUED | OUTPATIENT
Start: 2023-01-19 | End: 2023-01-22

## 2023-01-19 RX ORDER — MAGNESIUM SULFATE 1 G/100ML
1 INJECTION INTRAVENOUS ONCE
Status: COMPLETED | OUTPATIENT
Start: 2023-01-19 | End: 2023-01-19

## 2023-01-19 RX ADMIN — URSODIOL 500 MG: 250 TABLET ORAL at 04:01

## 2023-01-19 RX ADMIN — TRAMADOL HYDROCHLORIDE 50 MG: 50 TABLET, COATED ORAL at 04:01

## 2023-01-19 RX ADMIN — PIPERACILLIN AND TAZOBACTAM 4.5 G: 4; .5 INJECTION, POWDER, FOR SOLUTION INTRAVENOUS; PARENTERAL at 02:01

## 2023-01-19 RX ADMIN — THERA TABS 1 TABLET: TAB at 04:01

## 2023-01-19 RX ADMIN — LIDOCAINE HYDROCHLORIDE 50 MG: 20 INJECTION INTRAVENOUS at 10:01

## 2023-01-19 RX ADMIN — CHOLESTYRAMINE 4 G: 4 POWDER, FOR SUSPENSION ORAL at 08:01

## 2023-01-19 RX ADMIN — PROPOFOL 20 MG: 10 INJECTION, EMULSION INTRAVENOUS at 10:01

## 2023-01-19 RX ADMIN — VALSARTAN 160 MG: 80 TABLET, FILM COATED ORAL at 04:01

## 2023-01-19 RX ADMIN — Medication 1 TABLET: at 04:01

## 2023-01-19 RX ADMIN — ESCITALOPRAM OXALATE 5 MG: 5 TABLET, FILM COATED ORAL at 04:01

## 2023-01-19 RX ADMIN — URSODIOL 500 MG: 250 TABLET ORAL at 08:01

## 2023-01-19 RX ADMIN — AMLODIPINE BESYLATE 10 MG: 5 TABLET ORAL at 04:01

## 2023-01-19 RX ADMIN — PROPOFOL 50 MG: 10 INJECTION, EMULSION INTRAVENOUS at 10:01

## 2023-01-19 RX ADMIN — UMECLIDINIUM BROMIDE AND VILANTEROL TRIFENATATE 1 PUFF: 62.5; 25 POWDER RESPIRATORY (INHALATION) at 05:01

## 2023-01-19 RX ADMIN — MAGNESIUM SULFATE IN DEXTROSE 1 G: 10 INJECTION, SOLUTION INTRAVENOUS at 04:01

## 2023-01-19 RX ADMIN — CHOLESTYRAMINE 4 G: 4 POWDER, FOR SUSPENSION ORAL at 04:01

## 2023-01-19 RX ADMIN — BENZONATATE 100 MG: 100 CAPSULE ORAL at 04:01

## 2023-01-19 RX ADMIN — Medication 1 CAPSULE: at 04:01

## 2023-01-19 RX ADMIN — HEPARIN SODIUM 5000 UNITS: 5000 INJECTION, SOLUTION INTRAVENOUS; SUBCUTANEOUS at 08:01

## 2023-01-19 RX ADMIN — SODIUM CHLORIDE, SODIUM GLUCONATE, SODIUM ACETATE, POTASSIUM CHLORIDE AND MAGNESIUM CHLORIDE: 526; 502; 368; 37; 30 INJECTION, SOLUTION INTRAVENOUS at 10:01

## 2023-01-19 RX ADMIN — BENZONATATE 100 MG: 100 CAPSULE ORAL at 08:01

## 2023-01-19 NOTE — TRANSFER OF CARE
"Anesthesia Transfer of Care Note    Patient: Purnima Higgins    Procedure(s) Performed: * No procedures listed *    Patient location: Cath Lab    Anesthesia Type: general    Transport from OR: Transported from OR on room air with adequate spontaneous ventilation    Post pain: adequate analgesia    Post assessment: no apparent anesthetic complications    Post vital signs: stable    Level of consciousness: awake    Nausea/Vomiting: no nausea/vomiting    Complications: none    Transfer of care protocol was followed      Last vitals:   Visit Vitals  /65   Pulse 70   Temp 36.7 °C (98 °F) (Oral)   Resp 18   Ht 4' 10" (1.473 m)   Wt 79.4 kg (175 lb 0.7 oz)   SpO2 100   BMI 36.58 kg/m²     "

## 2023-01-19 NOTE — PT/OT/SLP PROGRESS
Physical Therapy Treatment    Patient Name:  Purnima Higgins   MRN:  [id number]    Recommendations:     Discharge Recommendations: home, home with home health, home health PT  Discharge Equipment Recommendations: none  Barriers to discharge: None    Assessment:     Purnima Higgins is a 82 y.o. female admitted with a medical diagnosis of Pleural effusion on left.  She presents with the following impairments/functional limitations: impaired endurance, impaired functional mobility. Pt agreeable to participate and ambulating further day by day.     Rehab Prognosis: Good; patient would benefit from acute skilled PT services to address these deficits and reach maximum level of function.    Recent Surgery: * No surgery found *      Plan:     During this hospitalization, patient to be seen 6 x/week to address the identified rehab impairments via gait training, therapeutic activities, therapeutic exercises and progress toward the following goals:    Plan of Care Expires:       Subjective     Chief Complaint: coughing  Patient/Family Comments/goals: to get stronger  Pain/Comfort:  Pain Rating 1: 0/10      Objective:     Communicated with nurse prior to session.  Patient found up in chair with oxygen, peripheral IV, PureWick upon PT entry to room.     General Precautions: Standard, fall  Orthopedic Precautions:    Braces:    Respiratory Status: Nasal cannula, flow 2 L/min     Functional Mobility:  Transfers:     Sit to Stand:  contact guard assistance with rolling walker  Gait: 100ft with RW and CGA, slow pace, kyphotic posture.       AM-PAC 6 CLICK MOBILITY  Turning over in bed (including adjusting bedclothes, sheets and blankets)?: 3  Sitting down on and standing up from a chair with arms (e.g., wheelchair, bedside commode, etc.): 3  Moving from lying on back to sitting on the side of the bed?: 3  Moving to and from a bed to a chair (including a wheelchair)?: 3  Need to walk in hospital room?: 3  Climbing 3-5 steps with a  railing?: 3  Basic Mobility Total Score: 18         Patient left up in chair with all lines intact and call button in reach..    GOALS:   Multidisciplinary Problems       Physical Therapy Goals          Problem: Physical Therapy    Goal Priority Disciplines Outcome Goal Variances Interventions   Physical Therapy Goal     PT, PT/OT Ongoing, Progressing     Description: Goals to be met by: 2023     Patient will increase functional independence with mobility by performin. Supine to sit with [place]  2. Sit to stand transfer with Modified [place]  3. Gait  x 200 feet with Modified [place] using Rolling Walker.                          Time Tracking:     PT Received On: 23  PT Start Time: 1422     PT Stop Time: 1437  PT Total Time (min): 15 min     Billable Minutes: Gait Training 15    Treatment Type: Treatment  PT/PTA: PT     PTA Visit Number: 2     2023

## 2023-01-19 NOTE — PROGRESS NOTES
[facility] - 5th Floor Med Surg  Cardiology  Progress Note    Patient Name: Purnima Higgins  MRN: [id number]  Admission Date: 2023  Hospital Length of Stay: 6 days  Code Status: Full Code   Attending Physician: Tristan Hodgson MD   Primary Care Physician: Jos Briseno MD  Expected Discharge Date:   Principal Problem:Pleural effusion on left    Subjective:   Consultation Reason: Request THEODORA     HPI:   Ms. Higgins is a 82 year old female, known to Dr. Smith, who presented to the hospital with cough. She reported lower extremity edema and chest radiograph revealed left pleural effusion. She underwent thoracentesis on 23 removing 300 MLS of fluid. Echocardiogram on 23 revealed normal LV Function with Grade I Diastolic Dysfunction. There was s small mobile echogenic structure on the aortic valve with some calcification. [facility] is consulted for THEODORA to get better visualization of reported echogenic structure on surface echo.     Hospital Course:   23: NAD Noted. Underwent THEODORA. Tolerated well. She is stable.     PMH: Hypertension, Hyperlipidemia, Biliary Cirrhosis, History of DVT, Obesity, Back Pain with Implanted Nerve Stimulator  PSH: Back Stimulator Implant, Neck/Knee/Back/Hand Surgeries, , Cholecystectomy  Family History: Mother- Cancer  Social History: Tobacco- Former Smoker, Alcohol- Beer/Whiskey, Substance Abuse- Negative     Previous Cardiac Diagnostics:   THEODORA (23):  LV systolic function 60-65%.  No intracardiac  Vegetation.  Small immobile round echodensity seen attached to the non-coronary cusp of the aortic valve, related to AV calcification and not vegetation.  Mild thickening of the MV associated with Mild-moderate MR.  No intracardiac thrombus is present in this study.  Left Pleural effusion is present, this can be better assessed by dedicated Chest Imaging.    Echocardiogram (23):  Concentric hypertrophy and normal systolic function.  The estimated  ejection fraction is 60%.  Normal right ventricular systolic function.  Grade I left ventricular diastolic dysfunction.  The estimated PA systolic pressure is 21 mmHg.  Small echogenic mobile structure on aortic leaflet likely is calcification. A small calcified vegetation can not be ruled out. If clinically indicated consider THEODORA.     Carotid US (7.20.22):  The study quality is below average.   1-39% stenosis in the proximal right internal carotid artery based on Bluth Criteria.   1-39% stenosis in the proximal left internal carotid artery based on Bluth Criteria.   Antegrade right vertebral artery flow. The left vertebral artery is poorly visualized.      Cardiac PET (12.29.16):  This is a normal perfusion study, no perfusion defects noted.   This scan is suggestive of low risk for future cardiovascular events.   The left ventricular cavity is noted to be normal on the stress studies. The stress left ventricular ejection fraction was calculated to be 66% and left ventricular global function is normal. The rest left ventricular cavity is noted to be normal. The rest left ventricular ejection fraction was calculated to be 63% and rest left ventricular global function is normal.   When compared to the resting ejection fraction (63%), the stress ejection fraction (66%) has increased.   The study quality is excellent.     Review of Systems   Cardiovascular:  Negative for chest pain.   Respiratory:  Negative for shortness of breath.      Objective:     Vital Signs (Most Recent):  Temp: 98 °F (36.7 °C) (01/19/23 0722)  Pulse: 62 (01/19/23 0722)  Resp: 18 (01/19/23 0722)  BP: (!) 144/74 (01/19/23 0722)  SpO2: 97 % (01/19/23 0722)   Vital Signs (24h Range):  Temp:  [98 °F (36.7 °C)-98.6 °F (37 °C)] 98 °F (36.7 °C)  Pulse:  [62-75] 62  Resp:  [18] 18  SpO2:  [92 %-98 %] 97 %  BP: (130-151)/(66-77) 144/74     Weight: 79.4 kg (175 lb 0.7 oz)  Body mass index is 36.58 kg/m².    SpO2: 97 %         Intake/Output Summary (Last 24  hours) at 1/19/2023 1118  Last data filed at 1/19/2023 1029  Gross per 24 hour   Intake 560 ml   Output 500 ml   Net 60 ml       Lines/Drains/Airways       Peripheral Intravenous Line  Duration                  Peripheral IV - Single Lumen 01/13/23 2007 20 G Anterior;Distal;Left Antecubital 5 days                    Significant Labs:   Recent Results (from the past 72 hour(s))   Basic Metabolic Panel    Collection Time: 01/17/23  8:38 AM   Result Value Ref Range    Sodium Level 128 (L) 136 - 145 mmol/L    Potassium Level 3.9 3.5 - 5.1 mmol/L    Chloride 98 98 - 107 mmol/L    Carbon Dioxide 26 23 - 31 mmol/L    Glucose Level 95 82 - 115 mg/dL    Blood Urea Nitrogen 5.1 (L) 9.8 - 20.1 mg/dL    Creatinine 0.73 0.55 - 1.02 mg/dL    BUN/Creatinine Ratio 7     Calcium Level Total 8.2 (L) 8.4 - 10.2 mg/dL    Anion Gap 4.0 mEq/L    eGFR >60 mls/min/1.73/m2   Magnesium    Collection Time: 01/17/23  8:38 AM   Result Value Ref Range    Magnesium Level 1.60 1.60 - 2.60 mg/dL   Echo    Collection Time: 01/17/23 11:53 AM   Result Value Ref Range    BSA 1.8 m2    TDI SEPTAL 0.06 m/s    LV LATERAL E/E' RATIO 6.90 m/s    LV SEPTAL E/E' RATIO 11.50 m/s    Right Atrial Pressure (from IVC) 3 mmHg    EF 60 %    Left Ventricular Outflow Tract Mean Velocity 0.78 cm/s    Left Ventricular Outflow Tract Mean Gradient 3.00 mmHg    TDI LATERAL 0.10 m/s    PV PEAK VELOCITY 1.24 cm/s    LVIDd 4.19 3.5 - 6.0 cm    IVS 1.07 0.6 - 1.1 cm    Posterior Wall 1.23 (A) 0.6 - 1.1 cm    LVIDs 2.93 2.1 - 4.0 cm    FS 30 28 - 44 %    LV mass 166.83 g    LA size 4.10 cm    TAPSE 2.11 cm    Left Ventricle Relative Wall Thickness 0.59 cm    AV mean gradient 8 mmHg    AV valve area 1.66 cm2    AV Velocity Ratio 0.56     AV index (prosthetic) 0.58     MV mean gradient 2 mmHg    MV valve area p 1/2 method 1.95 cm2    MV valve area by continuity eq 1.89 cm2    E/A ratio 0.96     Mean e' 0.08 m/s    E wave deceleration time 201.00 msec    LVOT diameter 1.90 cm     LVOT area 2.8 cm2    LVOT peak donnell 1.10 m/s    LVOT peak VTI 23.90 cm    Ao peak donnell 1.95 m/s    Ao VTI 40.9 cm    LVOT stroke volume 67.73 cm3    AV peak gradient 15 mmHg    MV peak gradient 5 mmHg    TV rest pulmonary artery pressure 21 mmHg    E/E' ratio 8.63 m/s    MV Peak E Donnell 0.69 m/s    TR Max Donnell 2.10 m/s    MV VTI 35.8 cm    MV stenosis pressure 1/2 time 113.00 ms    MV Peak A Donnell 0.72 m/s    LV Systolic Volume 33.00 mL    LV Systolic Volume Index 19.2 mL/m2    LV Diastolic Volume 78.10 mL    LV Diastolic Volume Index 45.41 mL/m2    LV Mass Index 97 g/m2    Triscuspid Valve Regurgitation Peak Gradient 18 mmHg    LA Volume Index (Mod) 34.7 mL/m2    LA volume (mod) 59.70 cm3   Osmolality, Serum    Collection Time: 01/17/23 12:02 PM   Result Value Ref Range    Osmolality 272 (L) 280 - 300 mOsm/kg   Osmolality, Urine    Collection Time: 01/17/23  1:24 PM   Result Value Ref Range    Urine Osmolality 287 (L) 300 - 1,300 mOsm/kg   Creatinine, Random Urine    Collection Time: 01/17/23  1:24 PM   Result Value Ref Range    Urine Creatinine 58.5 47.0 - 110.0 mg/dL   Sodium, Random Urine    Collection Time: 01/17/23  1:24 PM   Result Value Ref Range    Urine Sodium 64.0 mmol/L   Urea Nitrogen, Random Urine    Collection Time: 01/17/23  1:24 PM   Result Value Ref Range    Urine Urea Nitrogen 185.0 mg/dL   Blood Culture    Collection Time: 01/18/23  7:47 AM    Specimen: Blood   Result Value Ref Range    CULTURE, BLOOD ([facility]) No Growth At 24 Hours    Blood Culture    Collection Time: 01/18/23  7:47 AM    Specimen: Blood   Result Value Ref Range    CULTURE, BLOOD ([facility]) No Growth At 24 Hours    Cortisol    Collection Time: 01/18/23  7:47 AM   Result Value Ref Range    Cortisol Level 11.2 ug/dL   Basic Metabolic Panel    Collection Time: 01/19/23  4:41 AM   Result Value Ref Range    Sodium Level 128 (L) 136 - 145 mmol/L    Potassium Level 3.8 3.5 - 5.1 mmol/L    Chloride 97 (L) 98 - 107 mmol/L    Carbon Dioxide 25 23 - 31  mmol/L    Glucose Level 90 82 - 115 mg/dL    Blood Urea Nitrogen 5.4 (L) 9.8 - 20.1 mg/dL    Creatinine 0.68 0.55 - 1.02 mg/dL    BUN/Creatinine Ratio 8     Calcium Level Total 8.0 (L) 8.4 - 10.2 mg/dL    Anion Gap 6.0 mEq/L    eGFR >60 mls/min/1.73/m2   Osmolality, Serum    Collection Time: 01/19/23  4:41 AM   Result Value Ref Range    Osmolality 269 (L) 280 - 300 mOsm/kg   CBC with Differential    Collection Time: 01/19/23  4:41 AM   Result Value Ref Range    WBC 7.4 4.5 - 11.5 x10(3)/mcL    RBC 3.40 (L) 4.20 - 5.40 x10(6)/mcL    Hgb 10.4 (L) 12.0 - 16.0 gm/dL    Hct 30.5 (L) 37.0 - 47.0 %    MCV 89.7 80.0 - 94.0 fL    MCH 30.6 pg    MCHC 34.1 33.0 - 36.0 mg/dL    RDW 13.0 11.5 - 17.0 %    Platelet 155 130 - 400 x10(3)/mcL    MPV 9.5 7.4 - 10.4 fL    Neut % 55.0 %    Lymph % 18.3 %    Mono % 12.0 %    Eos % 13.2 %    Basophil % 1.0 %    Lymph # 1.35 0.6 - 4.6 x10(3)/mcL    Neut # 4.05 2.1 - 9.2 x10(3)/mcL    Mono # 0.88 0.1 - 1.3 x10(3)/mcL    Eos # 0.97 (H) 0 - 0.9 x10(3)/mcL    Baso # 0.07 0 - 0.2 x10(3)/mcL    IG# 0.04 0 - 0.04 x10(3)/mcL    IG% 0.5 %    NRBC% 0.0 %   Transesophageal echo (THEODORA)    Collection Time: 01/19/23 10:32 AM   Result Value Ref Range    BSA 1.8 m2       Physical Exam  Vitals reviewed.   Constitutional:       Appearance: Normal appearance.   HENT:      Head: Normocephalic.      Mouth/Throat:      Mouth: Mucous membranes are moist.      Pharynx: Oropharynx is clear.   Cardiovascular:      Rate and Rhythm: Normal rate and regular rhythm.   Pulmonary:      Effort: Pulmonary effort is normal. No respiratory distress.      Comments: NC O2 Support  Abdominal:      Palpations: Abdomen is soft.   Musculoskeletal:         General: Normal range of motion.      Cervical back: Neck supple.   Skin:     General: Skin is warm and dry.   Neurological:      General: No focal deficit present.      Mental Status: She is alert. Mental status is at baseline.   Psychiatric:         Behavior: Behavior normal.        Current Inpatient Medications:    Current Facility-Administered Medications:     albuterol-ipratropium 2.5 mg-0.5 mg/3 mL nebulizer solution 3 mL, 3 mL, Nebulization, Q4H PRN, Yefri Herrera MD    albuterol-ipratropium 2.5 mg-0.5 mg/3 mL nebulizer solution 3 mL, 3 mL, Nebulization, Q6H PRN, Aurora Kaufman, DOUGIE    aluminum-magnesium hydroxide-simethicone 200-200-20 mg/5 mL suspension 30 mL, 30 mL, Oral, QID PRN, Yefri Herrera MD    amitriptyline tablet 25 mg, 25 mg, Oral, Nightly PRN, Yefri Herrera MD    amLODIPine tablet 10 mg, 10 mg, Oral, Daily, Yefri Herrera MD, 10 mg at 01/18/23 0833    benzonatate capsule 100 mg, 100 mg, Oral, TID, Tristan Hodgson MD, 100 mg at 01/18/23 2003    calcium-vitamin D3 500 mg-5 mcg (200 unit) per tablet 1 tablet, 1 tablet, Oral, Daily, Yefri Herrera MD, 1 tablet at 01/18/23 0831    cholestyramine 4 gram packet 4 g, 1 packet, Oral, BID, Yefri Herrera MD, 4 g at 01/18/23 2003    dextromethorphan-guaiFENesin  mg/5 ml liquid 5 mL, 5 mL, Oral, Q4H PRN, Yefri Herrera MD    EScitalopram oxalate tablet 5 mg, 5 mg, Oral, Daily, Yefri Herrera MD, 5 mg at 01/18/23 0839    Lactobacillus acidophilus capsule 1 capsule, 1 capsule, Oral, Daily, Yefri Herrera MD, 1 capsule at 01/18/23 0831    melatonin tablet 6 mg, 6 mg, Oral, Nightly PRN, Yefri Herrera MD    multivitamin tablet, 1 tablet, Oral, Daily, Yefri Herrera MD, 1 tablet at 01/18/23 0831    ondansetron injection 4 mg, 4 mg, Intravenous, Q4H PRN, Yefri Herrera MD    polyethylene glycol packet 17 g, 17 g, Oral, BID PRN, Yefri Herrera MD    prochlorperazine injection Soln 5 mg, 5 mg, Intravenous, Q6H PRN, Yefri Herrera MD    senna-docusate 8.6-50 mg per tablet 2 tablet, 2 tablet, Oral, BID PRN, Yefri Herrera MD    simethicone chewable tablet 80 mg, 1 tablet, Oral, QID PRN, Yefri Herrera MD    sodium chloride 0.9% flush 10 mL, 10 mL, Intravenous, PRN, Yefri Herrera MD    traMADoL tablet 50 mg, 50 mg, Oral, Q6H PRN, Tristan Hodgson MD, 50 mg at  01/18/23 2003    umeclidinium-vilanteroL 62.5-25 mcg/actuation DsDv 1 puff, 1 puff, Inhalation, Daily, Yefri Herrera MD, 1 puff at 01/18/23 1619    ursodioL tablet 500 mg, 500 mg, Oral, BID, Yefri Herrera MD, 500 mg at 01/18/23 2102    valsartan tablet 160 mg, 160 mg, Oral, Daily, Yefri Herrera MD, 160 mg at 01/18/23 0833    VTE Risk Mitigation (From admission, onward)           Ordered     IP VTE HIGH RISK PATIENT  Once         01/14/23 0603     Place sequential compression device  Until discontinued         01/14/23 0603                    Assessment:   Aortic Valve Echogenic Structure/Density on Transthoracic Echocardiogram (1.17.23)    - Small immobile round echodensity seen attached to the non-coronary cusp of the aortic valve, related to AV calcification and not vegetation (THEODORA 1.19.23)    - Blood Cultures Negative Thus Far  Acute Hypoxemic Respiratory Failure (Improved)  Pleural Effusion (Left) Status Post Thoracentesis with ~ 300 ML Fluid Removed  Hyponatremia  Hypertension  Hyperlipidemia  History of DVT  Primary Biliary Cirrhosis  History of Atrial Septal Aneurysm  Chronic Back Pain with Nerve Stimulator  Anemia  COVID-19/Influenza Negative on 1.14.23    Plan:   THEODORA Performed. Patient Tolerated well. No Evidence of Aortic Valve Vegetation. Aortic Valve Calcification noted.  Will be available. Please call if needed.  Follow up with [facility]    DOUGIE Simental  Cardiology  [facility] - 5th Floor Med Surg  01/19/2023

## 2023-01-19 NOTE — ANESTHESIA POSTPROCEDURE EVALUATION
Anesthesia Post Evaluation    Patient: Purnima Higgins    Procedure(s) Performed: * No procedures listed *    Final Anesthesia Type: general      Patient location during evaluation: Cath Lab  Patient participation: Yes- Able to Participate  Level of consciousness: oriented and awake  Post-procedure vital signs: reviewed and stable  Pain management: adequate  Airway patency: patent    PONV status at discharge: No PONV  Anesthetic complications: no      Cardiovascular status: stable and hemodynamically stable  Respiratory status: spontaneous ventilation and unassisted  Hydration status: euvolemic  Follow-up not needed.  Comments: [facility]          Vitals Value Taken Time   /73 01/19/23 1100   Temp 36.6 °C (97.9 °F) 01/19/23 1100   Pulse 63 01/19/23 1100   Resp 17 01/19/23 1100   SpO2 96 % 01/19/23 1100         No case tracking events are documented in the log.      Pain/Dora Score: Pain Rating Prior to Med Admin: 6 (1/18/2023  8:03 PM)  Pain Rating Post Med Admin: 3 (1/18/2023  9:03 PM)

## 2023-01-19 NOTE — PROGRESS NOTES
Inpatient Nutrition Assessment    Admit Date: 1/13/2023   Total duration of encounter: 6 days     Nutrition Recommendation/Prescription     -Diet advancement as tolerated to goal: low sodium.   -Add Boost Plus (provides 360 kcal, 14 g protein per serving) BID to encourage oral intake    Communication of Recommendations: reviewed with patient/caregiver    Nutrition Assessment     Malnutrition Assessment/Nutrition-Focused Physical Exam    Malnutrition in the context of acute illness or injury  Degree of Malnutrition: does not meet criteria  Energy Intake: </= 50% of estimated energy requirement for >/= 5 days  Interpretation of Weight Loss: does not meet criteria  Body Fat:does not meet criteria  Area of Body Fat Loss: does not meet criteria  Muscle Mass Loss: does not meet criteria  Area of Muscle Mass Loss: does not meet criteria  Fluid Accumulation: does not meet criteria  Edema: does not meet criteria   Reduced  Strength: unable to obtain  A minimum of two characteristics is recommended for diagnosis of either severe or non-severe malnutrition.    Chart Review    Reason Seen: length of stay    Malnutrition Screening Tool Results   Have you recently lost weight without trying?: No  Have you been eating poorly because of a decreased appetite?: No   MST Score: 0     Diagnosis:  Aortic Valve Echogenic Structure/Density on Transthoracic Echocardiogram (1.17.23)  Acute Hypoxemic Respiratory Failure (Improved)  Pleural Effusion (Left) Status Post Thoracentesis with ~ 300 ML Fluid Removed  Hyponatremia  Hypertension  Hyperlipidemia  History of DVT  Primary Biliary Cirrhosis    Relevant Medical History: Hypertension, Hyperlipidemia, Biliary Cirrhosis, History of DVT, Obesity, Back Pain with Implanted Nerve Stimulator    Nutrition-Related Medications: Calcium-vitamin D3, probioic, MV, ursodiol  Calorie Containing IV Medications: no significant kcals from medications at this time    Nutrition-Related Labs:  1/19/23 Na  "128, Cl 97, Ca 8, osmolality 269    Diet/PN Order: Diet full liquid  Oral Supplement Order: none  Tube Feeding Order: none  Appetite/Oral Intake: poor/25-50% of meals  Factors Affecting Nutritional Intake: decreased appetite  Food/[place]/Cultural Preferences: none reported  Food Allergies: none reported    Skin Integrity: bruised (ecchymotic), other (see comments) (pink buttock)  Wound(s):       Comments    23 Decreased appetite since admit, reports <50% meals. Willing to trial vanilla Boost. No nausea, vomiting diarrhea or constipation. UBW of 171-175lbs, no unintentional wt loss.     Anthropometrics    Height: 4' 10" (147.3 cm) Height Method: Stated  Last Weight: 79.4 kg (175 lb 0.7 oz) (23 1100) Weight Method: Bed Scale  BMI (Calculated): 36.6  BMI Classification: obese grade II (BMI 35-39.9)     Ideal Body Weight (IBW), Female: 90 lb     % Ideal Body Weight, Female (lb): 194.5 %                    Usual Body Weight (UBW), k.72 kg  % Usual Body Weight: 102.38     Usual Weight Provided By: patient    Wt Readings from Last 5 Encounters:   23 79.4 kg (175 lb 0.7 oz)   23 81.2 kg (179 lb)   23 79.4 kg (175 lb)   22 78 kg (172 lb)   22 78.2 kg (172 lb 6.4 oz)     Weight Change(s) Since Admission:  Admit Weight: 79.4 kg (175 lb) (23 1129)      Estimated Needs    Weight Used For Calorie Calculations: 79.4 kg (175 lb 0.7 oz)  Energy Calorie Requirements (kcal): 1383kcals/d (MSJ X 1.2SF)  Energy Need Method: Kcal/kg, Thomas-St Jeor  Weight Used For Protein Calculations: 79.4 kg (175 lb 0.7 oz)  Protein Requirements: 79-95g/d (1-1.2g/kg)  Fluid Requirements (mL): 1383ml fl/d (1ml/kcal)  Temp: 97.9 °F (36.6 °C)       Enteral Nutrition    Patient not receiving enteral nutrition at this time.    Parenteral Nutrition    Patient not receiving parenteral nutrition support at this time.    Evaluation of Received Nutrient Intake    Calories: not meeting estimated " needs  Protein: not meeting estimated needs    Patient Education    Not applicable.    Nutrition Diagnosis     PES: Inadequate oral intake related to current medical condition as evidenced by <50% EER >/=5 days. (new)    Interventions/Goals     Intervention(s): general/healthful diet, commercial beverage, and collaboration with other providers  Goal: Meet greater than 75% of nutritional needs by follow-up. (new)    Monitoring & Evaluation     Dietitian will monitor food and beverage intake and weight change.  Nutrition Risk/Follow-Up: moderate (follow-up in 3-5 days)   Please consult if re-assessment needed sooner.

## 2023-01-19 NOTE — PT/OT/SLP PROGRESS
Occupational Therapy  Treatment    Purnima Higgins   MRN: [id number]   Admitting Diagnosis: Pleural effusion on left    OT Date of Treatment: 01/19/23   OT Start Time: 1446  OT Stop Time: 1500  OT Total Time (min): 14 min      OT/BOB: BOB     BOB Visit Number: 3    General Precautions: Standard, fall  Orthopedic Precautions:    Braces:      Spiritual, Cultural Beliefs, Orthodox Practices, Values that Affect Care: no    Subjective:  Communicated with RN prior to session.         Objective:       UE Dressing:  Patient performed UE Dressing with Minimal Assistance with gown like robe at chair.    LE Dressing:  Patient don/doffed socks with min A to doff socks in figure-4 position and Mod A to doff socks in figure-4 position.    Patient left up in chair with all lines intact and call button in reach    ASSESSMENT:  Purnima Higgins is a 82 y.o. female with a medical diagnosis of Pleural effusion on left.    Rehab potential is excellent    Activity tolerance: Excellent    Discharge recommendations: home with home health     Equipment recommendations:       GOALS:   Multidisciplinary Problems       Occupational Therapy Goals          Problem: Occupational Therapy    Goal Priority Disciplines Outcome Interventions   Occupational Therapy Goal     OT, PT/OT Ongoing, Progressing    Description: Goals to be met by: 1/30     Patient will increase functional independence with ADLs by performing:    LE Dressing with Modified [place].  Grooming while standing at sink with Modified [place].  Toileting from toilet with Modified [place] for hygiene and clothing management.   Bathing from  shower chair/bench with Modified [place].  Toilet transfer to toilet with Modified [place].                         Plan:  Patient to be seen 3 x/week, 5 x/week to address the above listed problems via self-care/home management, therapeutic activities, therapeutic exercises  Plan of Care expires: 01/30/23  Plan of Care  reviewed with: patient         01/19/2023

## 2023-01-19 NOTE — ANESTHESIA PREPROCEDURE EVALUATION
01/19/2023  Purnima Higgins is a 82 y.o., female , who presents for the following:       Date/Time: 01/19/23 0930   Scheduled providers: Sherif Villarreal MD   Procedure: TRANSESOPHAGEAL ECHO (THEODORA) (CUPID ONLY)   Diagnosis:        Sepsis [A41.9]       Hyponatremia [E87.1]       SOB (shortness of breath) [R06.02]       Pleural effusion on left [J90]       Chest pain [R07.9]       Bilateral lower extremity edema [R60.0]       Osteopenia, unspecified location [M85.80]   Location: [facility]     HPI:   Ms. Higgins is a 82 year old female, known to Dr. Smith, who presented to the hospital with cough. She reported lower extremity edema and chest radiograph revealed left pleural effusion. She underwent thoracentesis on 1.16.23 removing 300 MLS of fluid. Echocardiogram on 1.17.23 revealed normal LV Function with Grade I Diastolic Dysfunction. There was s small mobile echogenic structure on the aortic valve with some calcification. [facility] is consulted for THEODORA to get better visualization of reported echogenic structure on surface echo.      PMH: Hypertension, Hyperlipidemia, Biliary Cirrhosis, History of DVT, Obesity, Back Pain with Implanted Nerve Stimulator    CT of Chest (1/13/23):     FINDINGS:  There is moderate volume left pleural effusion.  Left lower lung lobe complete collapse consolidation.  The left upper lung lobe is clear of acute infiltrates.  There is trace of right pleural effusion.  Right lower lung zone subpleural  atelectasis without exclusion of mild infiltrates.  There is no hazy pneumonitis or pulmonary edema.  No cystic formation or bronchiectasis.     Chest lymph nodes are not enlarged in size.  Thoracic aorta is without aneurysmal dilatation or dissection.  Cardiac chambers are enlarged in size.     There is mild dilatation distal esophagus with some fluid which  may be secondary to reflux disease.  Stomach is entirely decompressed.  Adrenals are not enlarged in size.  Cholecystectomy clips.  Spinal neuro stimulator electrodes.  Posterior cervicothoracic fusions with transpedicular screws and bre constructs.  There is chronic appearing compression deformity of L1 vertebral body with some retropulsed bony fragment into the spinal canal.    Pre-op Assessment    I have reviewed the Patient Summary Reports.     I have reviewed the Nursing Notes. I have reviewed the NPO Status.   I have reviewed the Medications.     Review of Systems  Anesthesia Hx:  No problems with previous Anesthesia  Denies Family Hx of Anesthesia complications.   Denies Personal Hx of Anesthesia complications.   Cardiovascular:   Hypertension hyperlipidemia LVEF 60%   Hepatic/GI:   Primary biliary cirrhosis   Neurological:   Headaches        Physical Exam  General: Alert and Oriented    Airway:  Mallampati: III   Mouth Opening: Normal  TM Distance: Normal  Tongue: Normal  Neck ROM: Extension Decreased  Cervical extension restricted by cervical fusion hardware  Dental:  Intact    Chest/Lungs:  Normal Respiratory Rate    Heart:  Rate: Normal  Rhythm: Regular Rhythm       Latest Reference Range & Units 01/19/23 04:41   WBC 4.5 - 11.5 x10(3)/mcL 7.4   RBC 4.20 - 5.40 x10(6)/mcL 3.40 (L)   Hemoglobin 12.0 - 16.0 gm/dL 10.4 (L)   Hematocrit 37.0 - 47.0 % 30.5 (L)   MCV 80.0 - 94.0 fL 89.7   MCH pg 30.6   MCHC 33.0 - 36.0 mg/dL 34.1   RDW 11.5 - 17.0 % 13.0   Platelets 130 - 400 x10(3)/mcL 155   (L): Data is abnormally low   Latest Reference Range & Units 01/19/23 04:41   Sodium 136 - 145 mmol/L 128 (L)   Potassium 3.5 - 5.1 mmol/L 3.8   Chloride 98 - 107 mmol/L 97 (L)   CO2 23 - 31 mmol/L 25   Anion Gap mEq/L 6.0   BUN 9.8 - 20.1 mg/dL 5.4 (L)   Creatinine 0.55 - 1.02 mg/dL 0.68   BUN/CREAT RATIO  8   eGFR mls/min/1.73/m2 >60   Glucose 82 - 115 mg/dL 90   (L): Data is abnormally low      Anesthesia Plan  Type of  Anesthesia, risks & benefits discussed:    Anesthesia Type: Gen Natural Airway  Intra-op Monitoring Plan: Standard ASA Monitors  Post Op Pain Control Plan: IV/PO Opioids PRN  Induction:  IV  Airway Plan: Direct  Informed Consent: Informed consent signed with the Patient and all parties understand the risks and agree with anesthesia plan.  All questions answered. Patient consented to blood products? No  ASA Score: 4  Day of Surgery Review of History & Physical: H&P Update referred to the surgeon/provider.  Anesthesia Plan Notes: Nasal cannula vs facemask supplemental oxygenation   For patients with MASHA/obesity, may consider SuperNoval Nasal CPAP      Ready For Surgery From Anesthesia Perspective.     .

## 2023-01-20 LAB
AFP-TM SERPL-MCNC: 3 NG/ML
ALBUMIN SERPL-MCNC: 2.4 G/DL (ref 3.4–4.8)
ALBUMIN/GLOB SERPL: 0.8 RATIO (ref 1.1–2)
ALP SERPL-CCNC: 101 UNIT/L (ref 40–150)
ALT SERPL-CCNC: 15 UNIT/L (ref 0–55)
AST SERPL-CCNC: 24 UNIT/L (ref 5–34)
BASOPHILS # BLD AUTO: 0.07 X10(3)/MCL (ref 0–0.2)
BASOPHILS NFR BLD AUTO: 1 %
BILIRUBIN DIRECT+TOT PNL SERPL-MCNC: 0.5 MG/DL
BUN SERPL-MCNC: 5.3 MG/DL (ref 9.8–20.1)
CALCIUM SERPL-MCNC: 8.2 MG/DL (ref 8.4–10.2)
CANCER AG125 SERPL-ACNC: 100.8 UNIT/ML (ref 0–35)
CANCER AG19-9 SERPL-ACNC: <2.06 UNIT/ML (ref 0–37)
CEA SERPL-MCNC: 2.36 NG/ML (ref 0–3)
CHLORIDE SERPL-SCNC: 97 MMOL/L (ref 98–107)
CO2 SERPL-SCNC: 27 MMOL/L (ref 23–31)
CREAT SERPL-MCNC: 0.55 MG/DL (ref 0.55–1.02)
EOSINOPHIL # BLD AUTO: 0.88 X10(3)/MCL (ref 0–0.9)
EOSINOPHIL NFR BLD AUTO: 12.3 %
ERYTHROCYTE [DISTWIDTH] IN BLOOD BY AUTOMATED COUNT: 13.1 % (ref 11.5–17)
GFR SERPLBLD CREATININE-BSD FMLA CKD-EPI: >60 MLS/MIN/1.73/M2
GLOBULIN SER-MCNC: 3.1 GM/DL (ref 2.4–3.5)
GLUCOSE SERPL-MCNC: 83 MG/DL (ref 82–115)
HCT VFR BLD AUTO: 31.1 % (ref 37–47)
HGB BLD-MCNC: 10.7 GM/DL (ref 12–16)
IMM GRANULOCYTES # BLD AUTO: 0.03 X10(3)/MCL (ref 0–0.04)
IMM GRANULOCYTES NFR BLD AUTO: 0.4 %
LYMPHOCYTES # BLD AUTO: 1.47 X10(3)/MCL (ref 0.6–4.6)
LYMPHOCYTES NFR BLD AUTO: 20.6 %
MAGNESIUM SERPL-MCNC: 1.8 MG/DL (ref 1.6–2.6)
MCH RBC QN AUTO: 30.7 PG
MCHC RBC AUTO-ENTMCNC: 34.4 MG/DL (ref 33–36)
MCV RBC AUTO: 89.1 FL (ref 80–94)
MONOCYTES # BLD AUTO: 0.68 X10(3)/MCL (ref 0.1–1.3)
MONOCYTES NFR BLD AUTO: 9.5 %
NEUTROPHILS # BLD AUTO: 4 X10(3)/MCL (ref 2.1–9.2)
NEUTROPHILS NFR BLD AUTO: 56.2 %
NRBC BLD AUTO-RTO: 0 %
OSMOLALITY UR: 356 MOSM/KG (ref 300–1300)
PLATELET # BLD AUTO: 185 X10(3)/MCL (ref 130–400)
PMV BLD AUTO: 10 FL (ref 7.4–10.4)
POTASSIUM SERPL-SCNC: 3.8 MMOL/L (ref 3.5–5.1)
PROT SERPL-MCNC: 5.5 GM/DL (ref 5.8–7.6)
RBC # BLD AUTO: 3.49 X10(6)/MCL (ref 4.2–5.4)
SODIUM SERPL-SCNC: 128 MMOL/L (ref 136–145)
WBC # SPEC AUTO: 7.1 X10(3)/MCL (ref 4.5–11.5)

## 2023-01-20 PROCEDURE — 25000003 PHARM REV CODE 250: Performed by: INTERNAL MEDICINE

## 2023-01-20 PROCEDURE — 86304 IMMUNOASSAY TUMOR CA 125: CPT | Performed by: INTERNAL MEDICINE

## 2023-01-20 PROCEDURE — 83735 ASSAY OF MAGNESIUM: CPT | Performed by: INTERNAL MEDICINE

## 2023-01-20 PROCEDURE — 11000001 HC ACUTE MED/SURG PRIVATE ROOM

## 2023-01-20 PROCEDURE — 63600175 PHARM REV CODE 636 W HCPCS: Performed by: INTERNAL MEDICINE

## 2023-01-20 PROCEDURE — 82105 ALPHA-FETOPROTEIN SERUM: CPT | Performed by: INTERNAL MEDICINE

## 2023-01-20 PROCEDURE — 99223 PR INITIAL HOSPITAL CARE,LEVL III: ICD-10-PCS | Mod: ,,, | Performed by: THORACIC SURGERY (CARDIOTHORACIC VASCULAR SURGERY)

## 2023-01-20 PROCEDURE — 25000242 PHARM REV CODE 250 ALT 637 W/ HCPCS: Performed by: INTERNAL MEDICINE

## 2023-01-20 PROCEDURE — 36415 COLL VENOUS BLD VENIPUNCTURE: CPT | Performed by: INTERNAL MEDICINE

## 2023-01-20 PROCEDURE — 82378 CARCINOEMBRYONIC ANTIGEN: CPT | Performed by: INTERNAL MEDICINE

## 2023-01-20 PROCEDURE — 80053 COMPREHEN METABOLIC PANEL: CPT | Performed by: INTERNAL MEDICINE

## 2023-01-20 PROCEDURE — 86301 IMMUNOASSAY TUMOR CA 19-9: CPT | Performed by: INTERNAL MEDICINE

## 2023-01-20 PROCEDURE — 85025 COMPLETE CBC W/AUTO DIFF WBC: CPT | Performed by: INTERNAL MEDICINE

## 2023-01-20 PROCEDURE — 83935 ASSAY OF URINE OSMOLALITY: CPT | Performed by: INTERNAL MEDICINE

## 2023-01-20 PROCEDURE — 99223 1ST HOSP IP/OBS HIGH 75: CPT | Mod: ,,, | Performed by: THORACIC SURGERY (CARDIOTHORACIC VASCULAR SURGERY)

## 2023-01-20 RX ORDER — MAGNESIUM SULFATE HEPTAHYDRATE 40 MG/ML
2 INJECTION, SOLUTION INTRAVENOUS ONCE
Status: COMPLETED | OUTPATIENT
Start: 2023-01-20 | End: 2023-01-20

## 2023-01-20 RX ORDER — METOPROLOL TARTRATE 25 MG/1
25 TABLET, FILM COATED ORAL 2 TIMES DAILY
Status: DISCONTINUED | OUTPATIENT
Start: 2023-01-20 | End: 2023-01-27 | Stop reason: HOSPADM

## 2023-01-20 RX ADMIN — HEPARIN SODIUM 5000 UNITS: 5000 INJECTION, SOLUTION INTRAVENOUS; SUBCUTANEOUS at 09:01

## 2023-01-20 RX ADMIN — URSODIOL 500 MG: 250 TABLET ORAL at 09:01

## 2023-01-20 RX ADMIN — TRAMADOL HYDROCHLORIDE 50 MG: 50 TABLET, COATED ORAL at 10:01

## 2023-01-20 RX ADMIN — Medication 1 CAPSULE: at 08:01

## 2023-01-20 RX ADMIN — BENZONATATE 100 MG: 100 CAPSULE ORAL at 08:01

## 2023-01-20 RX ADMIN — BENZONATATE 100 MG: 100 CAPSULE ORAL at 10:01

## 2023-01-20 RX ADMIN — Medication 1 TABLET: at 08:01

## 2023-01-20 RX ADMIN — UMECLIDINIUM BROMIDE AND VILANTEROL TRIFENATATE 1 PUFF: 62.5; 25 POWDER RESPIRATORY (INHALATION) at 05:01

## 2023-01-20 RX ADMIN — METOPROLOL TARTRATE 25 MG: 25 TABLET, FILM COATED ORAL at 10:01

## 2023-01-20 RX ADMIN — HEPARIN SODIUM 5000 UNITS: 5000 INJECTION, SOLUTION INTRAVENOUS; SUBCUTANEOUS at 10:01

## 2023-01-20 RX ADMIN — URSODIOL 500 MG: 250 TABLET ORAL at 10:01

## 2023-01-20 RX ADMIN — VALSARTAN 160 MG: 80 TABLET, FILM COATED ORAL at 08:01

## 2023-01-20 RX ADMIN — CHOLESTYRAMINE 4 G: 4 POWDER, FOR SUSPENSION ORAL at 08:01

## 2023-01-20 RX ADMIN — AMLODIPINE BESYLATE 10 MG: 5 TABLET ORAL at 08:01

## 2023-01-20 RX ADMIN — MAGNESIUM SULFATE HEPTAHYDRATE 2 G: 40 INJECTION, SOLUTION INTRAVENOUS at 12:01

## 2023-01-20 RX ADMIN — TRAMADOL HYDROCHLORIDE 50 MG: 50 TABLET, COATED ORAL at 07:01

## 2023-01-20 RX ADMIN — ESCITALOPRAM OXALATE 5 MG: 5 TABLET, FILM COATED ORAL at 08:01

## 2023-01-20 RX ADMIN — BENZONATATE 100 MG: 100 CAPSULE ORAL at 02:01

## 2023-01-20 RX ADMIN — THERA TABS 1 TABLET: TAB at 08:01

## 2023-01-20 RX ADMIN — CHOLESTYRAMINE 4 G: 4 POWDER, FOR SUSPENSION ORAL at 10:01

## 2023-01-20 RX ADMIN — METOPROLOL TARTRATE 25 MG: 25 TABLET, FILM COATED ORAL at 12:01

## 2023-01-20 NOTE — CONSULTS
CT SURGERY PROGRESS NOTE  Purnima Higgins  82 y.o.  1940    Patients Procedure: * No surgery found *    Subjective  Interval History:     82-year-old female with a history of HTN, HLD, back pain with implanted stimulator who has been experiencing a cough x 3 weeks. She failed outpatient treatment by PCP having received doxycycline, prednisone and Levaquin.     Presented ED for further evaluation on 1/13/23. She also reported new onset swelling in her lower extremities data to presentation. CXR in ER revealed a small left pleural effusion, unchanged from previous imaging. CT of chest redemonstrated left pleural effusion. She was admitted to hospital medicine and pulmonary was consulted for evaluation of pleural effusion.    She underwent left thoracentesis on 01/16/23, removing ~300cc of thin, serosanguinous fluid.      Transthoracic echocardiogram 01/17/2023 with normal LVSF, EF 60%, grade 1 diastolic dysfunction.  There is a small mobile echogenic structure on the aortic valve with some calcification.    CT of the chest yesterday which reveals reaccumulation of the left pleural effusion with consolidation/atelectasis of the left lower lobe.  This appears essentially of little to no change in comparison to prior CT of the chest 01/13/2023, demonstrating a moderate sized left posterior basilar pleural effusion.    Dr nation now consulted for ? VATS       Review of Systems   Constitutional: Negative.    HENT: Negative.     Eyes: Negative.    Respiratory:  Positive for cough.    Cardiovascular:  Positive for chest pain.   Gastrointestinal: Negative.    Genitourinary: Negative.    Musculoskeletal: Negative.    Psychiatric/Behavioral: Negative.       Medication List  Infusions    Scheduled   amLODIPine  10 mg Oral Daily    benzonatate  100 mg Oral TID    calcium-vitamin D3  1 tablet Oral Daily    cholestyramine  1 packet Oral BID    heparin (porcine)  5,000 Units Subcutaneous Q12H    Lactobacillus acidophilus  1  capsule Oral Daily    metoprolol tartrate  25 mg Oral BID    multivitamin  1 tablet Oral Daily    umeclidinium-vilanteroL  1 puff Inhalation Daily    ursodioL  500 mg Oral BID    valsartan  160 mg Oral Daily       Objective:  Recent Vitals:  Temp:  [98.2 °F (36.8 °C)-98.9 °F (37.2 °C)] 98.7 °F (37.1 °C)  Pulse:  [57-72] 57  Resp:  [18] 18  SpO2:  [95 %-96 %] 95 %  BP: (150-162)/(62-80) 150/69    Physical Exam  Constitutional:       Appearance: Normal appearance.   HENT:      Head: Normocephalic.      Nose: Nose normal.      Mouth/Throat:      Mouth: Mucous membranes are moist.   Eyes:      Extraocular Movements: Extraocular movements intact.      Pupils: Pupils are equal, round, and reactive to light.   Cardiovascular:      Rate and Rhythm: Normal rate and regular rhythm.      Pulses: Normal pulses.      Heart sounds: Normal heart sounds.   Pulmonary:      Effort: Pulmonary effort is normal.      Comments: Decreased left base  Abdominal:      General: Abdomen is flat. Bowel sounds are normal.   Musculoskeletal:         General: Normal range of motion.      Cervical back: Normal range of motion.   Neurological:      General: No focal deficit present.      Mental Status: She is alert and oriented to person, place, and time.   Psychiatric:         Mood and Affect: Mood normal.        I/O last 24 hrs:  Intake/Output - Last 3 Shifts         01/18 0700  01/19 0659 01/19 0700  01/20 0659 01/20 0700  01/21 0659    P.O. 360 630 240    IV Piggyback  200     Total Intake(mL/kg) 360 (4.5) 830 (10.5) 240 (3)    Urine (mL/kg/hr) 500 (0.3) 1350 (0.7)     Stool 0 0     Total Output 500 1350     Net -140 -520 +240           Urine Occurrence 3 x  2 x    Stool Occurrence 1 x 2 x 1 x            Labs  ABGs: No results for input(s): PH, PCO2, PO2, HCO3, POCSATURATED, BE in the last 48 hours.  BMP:   Recent Labs   Lab 01/20/23  0329   *   K 3.8   CO2 27   BUN 5.3*   CREATININE 0.55   CALCIUM 8.2*   MG 1.80     CBC:   Recent Labs    Lab 01/20/23  0329   WBC 7.1   RBC 3.49*   HGB 10.7*   HCT 31.1*      MCV 89.1   MCH 30.7   MCHC 34.4     CMP:   Recent Labs   Lab 01/20/23  0329   CALCIUM 8.2*   ALBUMIN 2.4*   *   K 3.8   CO2 27   BUN 5.3*   CREATININE 0.55   ALKPHOS 101   ALT 15   AST 24   BILITOT 0.5         Imaging:   CT: CT Abdomen Pelvis  Without Contrast    Result Date: 1/20/2023  Left-sided pleural effusion and left lower lobe atelectasis.  Effusion is moderate to large in size. Stable left renal cyst Stable old fracture of T10 and L5 Electronically signed by: Sreekanth Nuno Date:    01/20/2023 Time:    12:04   CXR: No results found in the last 24 hours.  ECHO: I have reviewed all results within the past 24 hours and my personal findings are:  As per Dr Mott     Transesophageal echo (THEODORA)  Procedure: THEODORA      Final impression:     LV systolic function 60-65%.  No intracardiac  Vegetation.  Small immobile round echodensity seen attached to the non-coronary cusp of   the aortic valve, related to AV calcification and not vegetation.  Mild thickening of the MV associated with Mild-moderate MR.  No intracardiac thrombus is present in this study.  Left Pleural effusion is present, this can be better assessed by dedicated   Chest Imaging.     Indication: abnormal echocardiogram     Informed consent: obtained, signed copy placed in the chart.     Description of the procedure: After sedation was achieved (please see   anesthesia report for details), the esophagus intubated without   difficulties. Multiple orthogonal views obtained. Patient tolerated   procedure without immediate complications noted.     Findings:  Left atrium (LA): Normal LA size.  Left atrial appendage (PETER): No PETER thrombus is present.  Interatrial septum:  NO ASD or PFO noted on 2D or Color Doppler images.   Right atrium (RA): Normal RA size.  Left ventricle (LV): LVEF 60-65%.   Normal LV size.  Normal LV wall   thickness. No Regional wall motion  abnormalities noted.  Right ventricle (RV): Partially visualized in this study.  Aortic valve: Trileaflet. Small immobile round echodensity seen attached   to the non-coronary cusp of the aortic valve, related to AV calcification   and not vegetation. No Aortic stenosis.   Trace aortic regurgitation. No   Vegetation is present.  Mitral valve: Mild mitral annular calcification is present.  No Mitral   stenosis.  No Mitral valve prolapse. Mild-moderate Mitral regurgitation.   No Vegetation is present.  Tricuspid valve: Partially visualized, No Tricuspid stenosis. Trace-mild   Tricuspid regurgitation. No Vegetation is present.  Pulmonic valve: Not well visualized, no hemodynamically significant   pulmonic stenosis, no pulmonic regurgitation noted in this study. No   Vegetation is present.  Thoracic aorta: Scattered Atherosclerotic changes of the descending   thoracic aorta.  Normal Caliber aortic root.   Normal Caliber Proximal   portion of the ascending aorta.   Pericardium: No significant pericardial effusion is present.  Left Pleural effusion is present, this can be better assessed by dedicated Chest Imaging.      ASSESSMENT/PLAN:    Eval for left pleural effusion in progress per Dr Mott     Case and plan of care discussed with MD Julien Anthony PA-C

## 2023-01-20 NOTE — PROGRESS NOTES
[facility]  Hospital Medicine Progress Note        Chief Complaint: Inpatient Follow-up    HPI:   82-year-old female with significant history of HTN, HLD, chronic back pain status post spinal stimulator placement presented to the ED with worsening cough x3 weeks.  Patient was prescribed Levaquin, doxycycline, prednisone by PCP with no symptomatic improvement.  Also noted bilateral lower extremity swelling.  Given persistent complaints she decided to come to the ED.  Chest x-ray revealed a small left pleural effusion paid CT chest confirmed this.  Patient was admitted hospitalist medicine service.  Pulmonary was consulted for evaluation of pleural effusion.  Patient underwent left-sided thoracentesis on 01/16.  Echocardiogram on 01/17 with normal ejection fraction, grade 1 diastolic dysfunction.  Concern for small mobile echogenic structure on the aortic valve which needed further evaluation.  Cardiology consulted for GEOVANY.  Left-sided pleural effusion consistent with exudative effusion.  Cytology negative for malignancy and cultures negative.  Patient was initiated on Zosyn on admit which was being continued.  Patient remains hemodynamically stable.  No leukocytosis and afebrile.  Zosyn discontinued by pulmonology 1/19.  Repeat chest x-ray with persistent effusion and therefore a CT thorax was obtained which showed worsening effusion.  Geovany negative for vegetation.    Interval Hx:   Patient seen at bedside .  BP accelerated.  Otherwise hemodynamics stable.  Complaining of some left-sided pleuritic chest pain.  Respiratory status stable on nasal cannula.  Still has cough.      Objective/physical exam:  General: In no acute distress, afebrile  Chest:  Diminished left side   Heart: S1, S2, no appreciable murmur  Abdomen: Soft, nontender, BS +  MSK: Warm, no lower extremity edema, no clubbing or cyanosis  Neurologic: Alert and oriented x4, moving all extremities with good strength     VITAL  Patients aunt returned call.   Patient did come on the line and give verbal permission to speak with her Aunt Mike.    SIGNS: 24 HRS MIN & MAX LAST   Temp  Min: 97.9 °F (36.6 °C)  Max: 99 °F (37.2 °C) 98.4 °F (36.9 °C)   BP  Min: 147/72  Max: 162/78 (!) 162/78     Pulse  Min: 63  Max: 72  69   Resp  Min: 17  Max: 18 18   SpO2  Min: 95 %  Max: 97 % 96 %       Recent Labs   Lab 01/20/23 0329   WBC 7.1   RBC 3.49*   HGB 10.7*   HCT 31.1*   MCV 89.1   MCH 30.7   MCHC 34.4   RDW 13.1      MPV 10.0         Recent Labs   Lab 01/20/23 0329   *   K 3.8   CO2 27   BUN 5.3*   CREATININE 0.55   CALCIUM 8.2*   MG 1.80   ALBUMIN 2.4*   ALKPHOS 101   ALT 15   AST 24   BILITOT 0.5          Microbiology Results (last 7 days)       Procedure Component Value Units Date/Time    Blood Culture [[id number]]  (Normal) Collected: 01/18/23 0747    Order Status: Completed Specimen: Blood Updated: 01/19/23 1100     CULTURE, BLOOD ([facility]) No Growth At 24 Hours    Blood Culture [[id number]]  (Normal) Collected: 01/18/23 0747    Order Status: Completed Specimen: Blood Updated: 01/19/23 1100     CULTURE, BLOOD ([facility]) No Growth At 24 Hours    KOH Prep [[id number]] Collected: 01/16/23 0916    Order Status: Completed Specimen: Body Fluid from Pleural, Left Updated: 01/16/23 1017     KOH Prep No fungal elements seen    Mycobacteria and Nocardia Culture [[id number]] Collected: 01/16/23 0917    Order Status: Sent Specimen: Body Fluid from Pleural Fluid, Left Updated: 01/16/23 0917    Fungal Culture [[id number]] Collected: 01/16/23 0916    Order Status: Sent Specimen: Body Fluid from Pleural Fluid, Left Updated: 01/16/23 0916    Respiratory Culture [[id number]] Collected: 01/14/23 1219    Order Status: Completed Specimen: Sputum, Expectorated Updated: 01/16/23 0723     Respiratory Culture Normal respiratory prasanth     GRAM STAIN Quality 1+      Many Gram positive cocci             Scheduled Med:   amLODIPine  10 mg Oral Daily    benzonatate  100 mg Oral TID    calcium-vitamin D3  1 tablet Oral Daily    cholestyramine  1 packet Oral BID    EScitalopram oxalate  5 mg  Oral Daily    heparin (porcine)  5,000 Units Subcutaneous Q12H    Lactobacillus acidophilus  1 capsule Oral Daily    multivitamin  1 tablet Oral Daily    umeclidinium-vilanteroL  1 puff Inhalation Daily    ursodioL  500 mg Oral BID    valsartan  160 mg Oral Daily          Assessment/Plan:    Exudative left-sided pleural effusion status post thoracentesis now with recurrence-malignant versus parapneumonic versus other etiology  Community-acquired pneumonia-left lower lobe  Acute hypoxemic respiratory failure secondary to above  Acute versus acute on chronic hyponatremia-possible SIADH  Abnormal TTE-vu negative for vegetation  Chronic diastolic heart failure  Hypo magnesemia  History of PBC on ursodiol  Essential HTN-stable  Chronic back pain status post spinal cord stimulator placement   Chronic migraine  Prophylaxis    Patient had underwent thoracentesis  Negative cultures and no malignancy   Decided to obtain CT chest given persistent effusions on chest x-ray on 01/19   CT shows worsening effusion   Discussed case with pulmonology   Still could be malignant effusion, might consider repeat thoracentesis versus VATS   Await recs after pulmonology reviewed images  I will obtain CT abdomen/pelvis to rule out occult malignancy  Also ordered tumor markers  Pulmonology discontinued Zosyn 1/19 since she completed 6 days   Patient is afebrile, hemodynamically stable with no leukocytosis   Supplemental oxygen to keep saturations more than 90%   EF is normal, has diastolic dysfunction  VU done to further evaluate abnormal TT, no vegetation, no concern for infective endocarditis   Persistent hyponatremia, slightly better compared to admit   Patient was on HCTZ which is now discontinued  Questionable SIADH, placed on fluid restriction-1 L  Hold citalopram  Replace Mag, recheck tomorrow  Continue home meds-amlodipine, ursodiol, Questran, calcium vitamin-D,  probiotic, multivitamin, valsartan   BP still accelerated on  valsartan, amlodipine and therefore added metoprolol tartrate  DVT prophylaxis-subQ heparin      Ching Mnozon MD   01/20/2023

## 2023-01-20 NOTE — PT/OT/SLP PROGRESS
Occupational Therapy      Patient Name:  Purnima Higgins   MRN:  [id number]    Patient not seen today secondary to Patient unwilling to participate. Will follow-up as able.    1/20/2023

## 2023-01-20 NOTE — PROGRESS NOTES
[facility] - 5th Floor Med Surg  Pulmonary/Critical Care - Medicine  Progress Note    Patient Name: Purnima Higgins  MRN: [id number]  Admission Date: 1/13/2023  Hospital Length of Stay: 7 days  Code Status: Full Code  Attending Provider: Tristan Hodgson MD  Primary Care Provider: Jos Briseno MD     Subjective:     HPI:   This is an 82-year-old female with a history of HTN, HLD, back pain with implanted stimulator who has been experiencing a cough x 3 weeks. She failed outpatient treatment by PCP having received doxycycline, prednisone and Levaquin. She was then referred to the ED for further evaluation on 1/13/23. She also reported new onset swelling in her lower extremities data to presentation. CXR in ER revealed a small left pleural effusion, unchanged from previous imaging. CT of chest redemonstrated left pleural effusion. She was admitted to hospital medicine and pulmonary was consulted for evaluation of pleural effusion.  She underwent left thoracentesis on 01/16/23, removing ~300cc of thin, serosanguinous fluid.  Transthoracic echocardiogram 01/17/2023 with normal LVSF, EF 60%, grade 1 diastolic dysfunction.  There is a small mobile echogenic structure on the aortic valve with some calcification.    Interval History/Significant Events:   She is afebrile. Cultures with no growth at present.  She is receiving IV Zosyn.  Cardiology conducted THEODORA on 1/19/2023; results listed below.   C/o left pleuritic pain     Scheduled Medications:   amLODIPine  10 mg Oral Daily    benzonatate  100 mg Oral TID    calcium-vitamin D3  1 tablet Oral Daily    cholestyramine  1 packet Oral BID    EScitalopram oxalate  5 mg Oral Daily    heparin (porcine)  5,000 Units Subcutaneous Q12H    Lactobacillus acidophilus  1 capsule Oral Daily    multivitamin  1 tablet Oral Daily    umeclidinium-vilanteroL  1 puff Inhalation Daily    ursodioL  500 mg Oral BID    valsartan  160 mg Oral Daily     PRN  Medications:  albuterol-ipratropium, albuterol-ipratropium, aluminum-magnesium hydroxide-simethicone, amitriptyline, dextromethorphan-guaiFENesin  mg/5 ml, melatonin, ondansetron, polyethylene glycol, prochlorperazine, senna-docusate 8.6-50 mg, simethicone, sodium chloride 0.9%, traMADoL        Past Medical History:   Diagnosis Date    Arthritis     Back pain     scs implant- Dr ritter    Dyslipidemia     Hypertension     Liver disease     Spinal cord disorder        Past Surgical History:   Procedure Laterality Date    APPENDECTOMY      BACK SURGERY       SECTION      CHOLECYSTECTOMY      LAPAROSCOPIC REPAIR OF INCISIONAL HERNIA N/A 6/15/2022    Procedure: REPAIR, HERNIA, INCISIONAL, LAPAROSCOPIC;  Surgeon: Nayan Gonzalez MD;  Location: [facility] [place];  Service: General;  Laterality: N/A;    NECK SURGERY      SHOULDER SURGERY Bilateral     TOTAL KNEE ARTHROPLASTY         Objective:     Input/output:    Intake/Output Summary (Last 24 hours) at 2023 0935  Last data filed at 2023 2100  Gross per 24 hour   Intake 830 ml   Output 1350 ml   Net -520 ml         Vital Signs (Most Recent):  Temp: 98.4 °F (36.9 °C) (23)  Pulse: 69 (23)  Resp: 18 (23)  BP: (!) 162/78 (23)  SpO2: 96 % (23)    Body mass index is 36.58 kg/m².  Weight: 79.4 kg (175 lb 0.7 oz) Vital Signs (24h Range):  Temp:  [97.9 °F (36.6 °C)-99 °F (37.2 °C)] 98.4 °F (36.9 °C)  Pulse:  [63-72] 69  Resp:  [17-18] 18  SpO2:  [95 %-97 %] 96 %  BP: (147-162)/(71-80) 162/78     Physical Exam  Constitutional:       Appearance: She is obese.   HENT:      Mouth/Throat:      Mouth: Mucous membranes are moist.      Pharynx: Oropharynx is clear.   Eyes:      Conjunctiva/sclera: Conjunctivae normal.      Pupils: Pupils are equal, round, and reactive to light.   Cardiovascular:      Rate and Rhythm: Normal rate and regular rhythm.   Pulmonary:      Breath sounds: Rhonchi (Few bilateral coarse rhonchi  posterior bases) present. No wheezing or rales.      Comments: E to A changes noted left posterior lower 1/2 lung field  Abdominal:      General: Bowel sounds are normal.      Palpations: Abdomen is soft.   Musculoskeletal:      Right lower leg: No edema.      Left lower leg: No edema.   Lymphadenopathy:      Cervical: No cervical adenopathy.   Skin:     General: Skin is warm and dry.   Neurological:      Mental Status: She is alert and oriented to person, place, and time.       Lines/Drains/Airways       Peripheral Intravenous Line  Duration                  Peripheral IV - Single Lumen 01/13/23 2007 20 G Anterior;Distal;Left Antecubital 6 days                  Significant Labs:    Lab Results   Component Value Date    WBC 7.1 01/20/2023    HGB 10.7 (L) 01/20/2023    HCT 31.1 (L) 01/20/2023    MCV 89.1 01/20/2023     01/20/2023         Recent Labs   Lab 01/20/23  0329   *   K 3.8   CO2 27   BUN 5.3*   CREATININE 0.55   CALCIUM 8.2*   MG 1.80   AST 24   ALT 15   ALKPHOS 101   ALBUMIN 2.4*       Imaging:  CT Chest Without Contrast  Narrative: EXAMINATION:  CT CHEST WITHOUT CONTRAST    CLINICAL HISTORY:  Further evaluate pleural effusion left side;    TECHNIQUE:  Low dose axial images, sagittal and coronal reformations were obtained from the thoracic inlet to the lung bases. Contrast was not administered.  Automatic exposure control is utilized to reduce patient radiation exposure.    COMPARISON:  01/13/2023    FINDINGS:  There is a left-sided pleural effusion.  Have a it appears larger than the prior examination.  There is left lower lobe atelectasis.  There is mild right lower lobe atelectasis.  There are changes consistent with COPD in the lungs bilaterally.  Mediastinum appears normal.  Chest wall appears normal.  Upper abdomen shows some postsurgical changes consistent with previous cholecystectomy.  There are osteoporotic and degenerative changes seen in the spine and postsurgical changes seen  consistent with spinal fusion.  The patient has undergone bilateral shoulder arthroplasty.  Impression: Worsening left-sided pleural effusion    Left lower lobe atelectasis    COPD    Electronically signed by: Sreekanth Nuno  Date:    01/19/2023  Time:    17:17  Transesophageal echo (THEODORA)  Procedure: THEODORA     Final impression:    LV systolic function 60-65%.  No intracardiac  Vegetation.  Small immobile round echodensity seen attached to the non-coronary cusp of   the aortic valve, related to AV calcification and not vegetation.  Mild thickening of the MV associated with Mild-moderate MR.  No intracardiac thrombus is present in this study.  Left Pleural effusion is present, this can be better assessed by dedicated   Chest Imaging.    Indication: abnormal echocardiogram    Informed consent: obtained, signed copy placed in the chart.    Description of the procedure: After sedation was achieved (please see   anesthesia report for details), the esophagus intubated without   difficulties. Multiple orthogonal views obtained. Patient tolerated   procedure without immediate complications noted.    Findings:  Left atrium (LA): Normal LA size.  Left atrial appendage (PETER): No PETER thrombus is present.  Interatrial septum:  NO ASD or PFO noted on 2D or Color Doppler images.   Right atrium (RA): Normal RA size.  Left ventricle (LV): LVEF 60-65%.   Normal LV size.  Normal LV wall   thickness. No Regional wall motion abnormalities noted.  Right ventricle (RV): Partially visualized in this study.  Aortic valve: Trileaflet. Small immobile round echodensity seen attached   to the non-coronary cusp of the aortic valve, related to AV calcification   and not vegetation. No Aortic stenosis.   Trace aortic regurgitation. No   Vegetation is present.  Mitral valve: Mild mitral annular calcification is present.  No Mitral   stenosis.  No Mitral valve prolapse. Mild-moderate Mitral regurgitation.   No Vegetation is present.  Tricuspid  valve: Partially visualized, No Tricuspid stenosis. Trace-mild   Tricuspid regurgitation. No Vegetation is present.  Pulmonic valve: Not well visualized, no hemodynamically significant   pulmonic stenosis, no pulmonic regurgitation noted in this study. No   Vegetation is present.  Thoracic aorta: Scattered Atherosclerotic changes of the descending   thoracic aorta.  Normal Caliber aortic root.   Normal Caliber Proximal   portion of the ascending aorta.   Pericardium: No significant pericardial effusion is present.  Left Pleural effusion is present, this can be better assessed by dedicated   Chest Imaging.  X-Ray Chest PA And Lateral  Narrative: EXAMINATION:  XR CHEST PA AND LATERAL    CLINICAL HISTORY:  left pleural effusion - EPA, lateral, and left lateral decubidus;, .    FINDINGS:  Examination reveals cardiomediastinal silhouette and pleuroparenchymal changes to be essentially unchanged as compared with the previous exam persistent blunting of the left costophrenic angle and posterior sulcus indicating the presence of a pleural effusion.    There is increased left retrocardiac density and silhouetting of the left hemidiaphragm although these might be related to pleural fluid may also represent an infiltrate/atelectasis  Impression: No significant change as compared with the previous exam.    Changes of left-sided pleural effusion    Electronically signed by: Joseluis Lewis  Date:    01/19/2023  Time:    08:55  X-Ray Chest Lateral Decubitus Left  Narrative: EXAMINATION:  XR CHEST LATERAL DECUBITUS LEFT    CLINICAL HISTORY:  pleural effusion - EPA, lateral, and left lateral decubidus;    COMPARISON:  January 17, 2023    FINDINGS:  A lateral decubitus reveals layering of pleural fluid on the left no other significant change as compared with the previous exam  Impression: Layering of pleural effusion    Electronically signed by: Joseluis Lewis  Date:    01/19/2023  Time:    08:54       Assessment/Plan:      Neutrophil predominant serosanguineous exudative left pleural effusion, cytology negative for malignancy.  No culture growth.    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

## 2023-01-20 NOTE — PT/OT/SLP PROGRESS
Attempted at 1130, RN reports pt leaving for CT. Attempted again at 1330; pt declined to get OOB at this time, reports that it's not a good day today, but will attempt PT over the weekend.

## 2023-01-20 NOTE — PLAN OF CARE
01/20/23 1055   Discharge Reassessment   Assessment Type Discharge Planning Reassessment   Did the patient's condition or plan change since previous assessment? No   Discharge Plan discussed with: Spouse/sig other;Patient   Communicated LIZETT with patient/caregiver Date not available/Unable to determine   Discharge Plan A Home Health   Discharge Plan B Home with family   DME Needed Upon Discharge  none   Discharge Barriers Identified None   Why the patient remains in the hospital Requires continued medical care   Post-Acute Status   Post-Acute Authorization Home Health   Home Health Status Referrals Sent   Discharge Delays None known at this time

## 2023-01-21 LAB
ALBUMIN SERPL-MCNC: 2.4 G/DL (ref 3.4–4.8)
ALBUMIN/GLOB SERPL: 0.7 RATIO (ref 1.1–2)
ALP SERPL-CCNC: 101 UNIT/L (ref 40–150)
ALT SERPL-CCNC: 15 UNIT/L (ref 0–55)
AST SERPL-CCNC: 22 UNIT/L (ref 5–34)
BASOPHILS # BLD AUTO: 0.06 X10(3)/MCL (ref 0–0.2)
BASOPHILS NFR BLD AUTO: 0.8 %
BILIRUBIN DIRECT+TOT PNL SERPL-MCNC: 0.4 MG/DL
BUN SERPL-MCNC: 7.2 MG/DL (ref 9.8–20.1)
CALCIUM SERPL-MCNC: 8.5 MG/DL (ref 8.4–10.2)
CHLORIDE SERPL-SCNC: 98 MMOL/L (ref 98–107)
CO2 SERPL-SCNC: 26 MMOL/L (ref 23–31)
CREAT SERPL-MCNC: 0.6 MG/DL (ref 0.55–1.02)
EOSINOPHIL # BLD AUTO: 0.84 X10(3)/MCL (ref 0–0.9)
EOSINOPHIL NFR BLD AUTO: 11.4 %
ERYTHROCYTE [DISTWIDTH] IN BLOOD BY AUTOMATED COUNT: 13.1 % (ref 11.5–17)
GFR SERPLBLD CREATININE-BSD FMLA CKD-EPI: >60 MLS/MIN/1.73/M2
GLOBULIN SER-MCNC: 3.6 GM/DL (ref 2.4–3.5)
GLUCOSE SERPL-MCNC: 85 MG/DL (ref 82–115)
HCT VFR BLD AUTO: 29.7 % (ref 37–47)
HGB BLD-MCNC: 10.2 GM/DL (ref 12–16)
IMM GRANULOCYTES # BLD AUTO: 0.05 X10(3)/MCL (ref 0–0.04)
IMM GRANULOCYTES NFR BLD AUTO: 0.7 %
LYMPHOCYTES # BLD AUTO: 1.77 X10(3)/MCL (ref 0.6–4.6)
LYMPHOCYTES NFR BLD AUTO: 24.1 %
MAGNESIUM SERPL-MCNC: 1.9 MG/DL (ref 1.6–2.6)
MCH RBC QN AUTO: 30.4 PG
MCHC RBC AUTO-ENTMCNC: 34.3 MG/DL (ref 33–36)
MCV RBC AUTO: 88.7 FL (ref 80–94)
MONOCYTES # BLD AUTO: 0.74 X10(3)/MCL (ref 0.1–1.3)
MONOCYTES NFR BLD AUTO: 10.1 %
NEUTROPHILS # BLD AUTO: 3.89 X10(3)/MCL (ref 2.1–9.2)
NEUTROPHILS NFR BLD AUTO: 52.9 %
NRBC BLD AUTO-RTO: 0 %
PLATELET # BLD AUTO: 200 X10(3)/MCL (ref 130–400)
PMV BLD AUTO: 10 FL (ref 7.4–10.4)
POTASSIUM SERPL-SCNC: 4.3 MMOL/L (ref 3.5–5.1)
PROT SERPL-MCNC: 6 GM/DL (ref 5.8–7.6)
RBC # BLD AUTO: 3.35 X10(6)/MCL (ref 4.2–5.4)
SODIUM SERPL-SCNC: 130 MMOL/L (ref 136–145)
WBC # SPEC AUTO: 7.4 X10(3)/MCL (ref 4.5–11.5)

## 2023-01-21 PROCEDURE — 80053 COMPREHEN METABOLIC PANEL: CPT | Performed by: INTERNAL MEDICINE

## 2023-01-21 PROCEDURE — 25000003 PHARM REV CODE 250: Performed by: INTERNAL MEDICINE

## 2023-01-21 PROCEDURE — 36415 COLL VENOUS BLD VENIPUNCTURE: CPT | Performed by: INTERNAL MEDICINE

## 2023-01-21 PROCEDURE — 63600175 PHARM REV CODE 636 W HCPCS: Performed by: INTERNAL MEDICINE

## 2023-01-21 PROCEDURE — 85025 COMPLETE CBC W/AUTO DIFF WBC: CPT | Performed by: INTERNAL MEDICINE

## 2023-01-21 PROCEDURE — 11000001 HC ACUTE MED/SURG PRIVATE ROOM

## 2023-01-21 PROCEDURE — 25000242 PHARM REV CODE 250 ALT 637 W/ HCPCS: Performed by: INTERNAL MEDICINE

## 2023-01-21 PROCEDURE — 83735 ASSAY OF MAGNESIUM: CPT | Performed by: INTERNAL MEDICINE

## 2023-01-21 RX ORDER — MAGNESIUM SULFATE 1 G/100ML
1 INJECTION INTRAVENOUS ONCE
Status: COMPLETED | OUTPATIENT
Start: 2023-01-21 | End: 2023-01-21

## 2023-01-21 RX ADMIN — MAGNESIUM SULFATE IN DEXTROSE 1 G: 10 INJECTION, SOLUTION INTRAVENOUS at 01:01

## 2023-01-21 RX ADMIN — Medication 1 TABLET: at 09:01

## 2023-01-21 RX ADMIN — BENZONATATE 100 MG: 100 CAPSULE ORAL at 08:01

## 2023-01-21 RX ADMIN — AMLODIPINE BESYLATE 10 MG: 5 TABLET ORAL at 09:01

## 2023-01-21 RX ADMIN — METOPROLOL TARTRATE 25 MG: 25 TABLET, FILM COATED ORAL at 08:01

## 2023-01-21 RX ADMIN — CHOLESTYRAMINE 4 G: 4 POWDER, FOR SUSPENSION ORAL at 08:01

## 2023-01-21 RX ADMIN — BENZONATATE 100 MG: 100 CAPSULE ORAL at 09:01

## 2023-01-21 RX ADMIN — BENZONATATE 100 MG: 100 CAPSULE ORAL at 03:01

## 2023-01-21 RX ADMIN — THERA TABS 1 TABLET: TAB at 09:01

## 2023-01-21 RX ADMIN — UMECLIDINIUM BROMIDE AND VILANTEROL TRIFENATATE 1 PUFF: 62.5; 25 POWDER RESPIRATORY (INHALATION) at 05:01

## 2023-01-21 RX ADMIN — METOPROLOL TARTRATE 25 MG: 25 TABLET, FILM COATED ORAL at 09:01

## 2023-01-21 RX ADMIN — HEPARIN SODIUM 5000 UNITS: 5000 INJECTION, SOLUTION INTRAVENOUS; SUBCUTANEOUS at 08:01

## 2023-01-21 RX ADMIN — VALSARTAN 160 MG: 80 TABLET, FILM COATED ORAL at 09:01

## 2023-01-21 RX ADMIN — CHOLESTYRAMINE 4 G: 4 POWDER, FOR SUSPENSION ORAL at 09:01

## 2023-01-21 RX ADMIN — URSODIOL 500 MG: 250 TABLET ORAL at 08:01

## 2023-01-21 RX ADMIN — Medication 1 CAPSULE: at 09:01

## 2023-01-21 RX ADMIN — HEPARIN SODIUM 5000 UNITS: 5000 INJECTION, SOLUTION INTRAVENOUS; SUBCUTANEOUS at 09:01

## 2023-01-21 NOTE — PROGRESS NOTES
[facility]  Hospital Medicine Progress Note        Chief Complaint: Inpatient Follow-up    HPI:   82-year-old female with significant history of HTN, HLD, chronic back pain status post spinal stimulator placement presented to the ED with worsening cough x3 weeks.  Patient was prescribed Levaquin, doxycycline, prednisone by PCP with no symptomatic improvement.  Also noted bilateral lower extremity swelling.  Given persistent complaints she decided to come to the ED.  Chest x-ray revealed a small left pleural effusion paid CT chest confirmed this.  Patient was admitted hospitalist medicine service.  Pulmonary was consulted for evaluation of pleural effusion.  Patient underwent left-sided thoracentesis on 01/16.  Echocardiogram on 01/17 with normal ejection fraction, grade 1 diastolic dysfunction.  Concern for small mobile echogenic structure on the aortic valve which needed further evaluation.  Cardiology consulted for GEOVANY.  Left-sided pleural effusion consistent with exudative effusion.  Cytology negative for malignancy and cultures negative.  Patient was initiated on Zosyn on admit which was being continued.  Patient remains hemodynamically stable.  No leukocytosis and afebrile.  Zosyn discontinued by pulmonology 1/19.  Repeat chest x-ray with persistent effusion and therefore a CT thorax was obtained which showed worsening effusion.  Geovany negative for vegetation.  Discussed with pulmonology, concerns for malignant effusion.  Tumor markers, CT abdomen/pelvis ordered.  Pulmonology recommended consulting CT surgery for possible VATS    Interval Hx:   Patient seen at bedside .  Comfortably sitting in the couch today.  Respiratory status is stable on nasal cannula.  No new complaints today.  Does have left-sided chest discomfort occasionally, more pleuritic.  No acute events overnight hemodynamics are stable.      Objective/physical exam:  General: In no acute distress, afebrile  Chest:   Diminished left side   Heart: S1, S2, no appreciable murmur  Abdomen: Soft, nontender, BS +  MSK: Warm, no lower extremity edema, no clubbing or cyanosis  Neurologic: Alert and oriented x4, moving all extremities with good strength     VITAL SIGNS: 24 HRS MIN & MAX LAST   Temp  Min: 98.1 °F (36.7 °C)  Max: 98.7 °F (37.1 °C) 98.2 °F (36.8 °C)   BP  Min: 130/70  Max: 151/62 (!) 148/74     Pulse  Min: 57  Max: 71  60   Resp  Min: 18  Max: 18 18   SpO2  Min: 94 %  Max: 96 % 95 %       Recent Labs   Lab 01/21/23  0639   WBC 7.4   RBC 3.35*   HGB 10.2*   HCT 29.7*   MCV 88.7   MCH 30.4   MCHC 34.3   RDW 13.1      MPV 10.0         Recent Labs   Lab 01/21/23  0639   *   K 4.3   CO2 26   BUN 7.2*   CREATININE 0.60   CALCIUM 8.5   MG 1.90   ALBUMIN 2.4*   ALKPHOS 101   ALT 15   AST 22   BILITOT 0.4          Microbiology Results (last 7 days)       Procedure Component Value Units Date/Time    Blood Culture [[id number]]  (Normal) Collected: 01/18/23 0747    Order Status: Completed Specimen: Blood Updated: 01/20/23 1100     CULTURE, BLOOD ([facility]) No Growth At 48 Hours    Blood Culture [[id number]]  (Normal) Collected: 01/18/23 0747    Order Status: Completed Specimen: Blood Updated: 01/20/23 1100     CULTURE, BLOOD ([facility]) No Growth At 48 Hours    CHARLES Prep [[id number]] Collected: 01/16/23 0916    Order Status: Completed Specimen: Body Fluid from Pleural, Left Updated: 01/16/23 1017     KOH Prep No fungal elements seen    Mycobacteria and Nocardia Culture [[id number]] Collected: 01/16/23 0917    Order Status: Sent Specimen: Body Fluid from Pleural Fluid, Left Updated: 01/16/23 0917    Fungal Culture [[id number]] Collected: 01/16/23 0916    Order Status: Sent Specimen: Body Fluid from Pleural Fluid, Left Updated: 01/16/23 0916    Respiratory Culture [[id number]] Collected: 01/14/23 1219    Order Status: Completed Specimen: Sputum, Expectorated Updated: 01/16/23 0747     Respiratory Culture Normal respiratory prasanth     GRAM  STAIN Quality 1+      Many Gram positive cocci             Scheduled Med:   amLODIPine  10 mg Oral Daily    benzonatate  100 mg Oral TID    calcium-vitamin D3  1 tablet Oral Daily    cholestyramine  1 packet Oral BID    heparin (porcine)  5,000 Units Subcutaneous Q12H    Lactobacillus acidophilus  1 capsule Oral Daily    metoprolol tartrate  25 mg Oral BID    multivitamin  1 tablet Oral Daily    umeclidinium-vilanteroL  1 puff Inhalation Daily    ursodioL  500 mg Oral BID    valsartan  160 mg Oral Daily          Assessment/Plan:    Exudative left-sided pleural effusion status post thoracentesis now with recurrence-malignant versus parapneumonic versus other etiology  Elevated -rule out ovarian cancer with meigs syndrome  Community-acquired pneumonia-left lower lobe-completed treatment  Acute hypoxemic respiratory failure secondary to above  Acute versus acute on chronic hyponatremia-possible SIADH  Abnormal TTE-vu negative for vegetation  Chronic diastolic heart failure  Hypo magnesemia  History of PBC on ursodiol  Essential HTN-stable  Chronic back pain status post spinal cord stimulator placement   Chronic migraine  Prophylaxis      Persistent/worsening left-sided effusion despite paracentesis   No empyema, no malignancy per fluid studies  Concern for malignant effusion  Pulmonology has consulted CT surgery for VATS, awaiting recommendations   I ordered tumor markers    is elevated which raises concerns for possible underlying ovarian cancer with meigs syndrome  CT abdomen/pelvis with no occult malignancy  Have ordered ultrasound pelvis to further evaluate ovaries  Pulmonology discontinued Zosyn 1/19 since she completed 6 days   Patient is afebrile, hemodynamically stable with no leukocytosis   Supplemental oxygen to keep saturations more than 90%   EF is normal, has diastolic dysfunction  VU done to further evaluate abnormal TT, no vegetation, no concern for infective endocarditis   Hyponatremia  improving on fluid restriction  Questionable SIADH  Patient was on HCTZ which is now discontinued  Hold citalopram given hyponatremia  Replace Mag, recheck tomorrow  Continue home meds-amlodipine, ursodiol, Questran, calcium vitamin-D,  probiotic, multivitamin, valsartan   BP better after adding metoprolol tartrate to valsartan, amlodipine  DVT prophylaxis-subQ heparin    Await ultrasound pelvis to rule out ovarian cancer   Await recommendations from CT surgery as far as MAREI Monzon MD   01/21/2023

## 2023-01-21 NOTE — PROGRESS NOTES
[facility] - 5th Floor Med Surg  Pulmonary/Critical Care - Medicine  Progress Note    Patient Name: Purnima Higgins  MRN: [id number]  Admission Date: 1/13/2023  Hospital Length of Stay: 8 days  Code Status: Full Code  Attending Provider: Ching Monzon MD  Primary Care Provider: Jos Briseno MD     Subjective:     HPI:   This is an 82-year-old female with a history of HTN, HLD, back pain with implanted stimulator who has been experiencing a cough x 3 weeks. She failed outpatient treatment by PCP having received doxycycline, prednisone and Levaquin. She was then referred to the ED for further evaluation on 1/13/23. She also reported new onset swelling in her lower extremities data to presentation. CXR in ER revealed a small left pleural effusion, unchanged from previous imaging. CT of chest redemonstrated left pleural effusion. She was admitted to hospital medicine and pulmonary was consulted for evaluation of pleural effusion.  She underwent left thoracentesis on 01/16/23, removing ~300cc of thin, serosanguinous fluid, cytology negative.  Transthoracic echocardiogram 01/17/2023 with normal LVSF, EF 60%, grade 1 diastolic dysfunction.  There is a small mobile echogenic structure on the aortic valve with some calcification however THEODORA 1/19 with no evidence of mobile valvular structure.     Interval History/Significant Events:   Pleural fluid has continues to re accumulate and CV surgery now consulted for VATS. CA-125 significantly elevated, pelvic US ordered.     Scheduled Medications:   amLODIPine  10 mg Oral Daily    benzonatate  100 mg Oral TID    calcium-vitamin D3  1 tablet Oral Daily    cholestyramine  1 packet Oral BID    heparin (porcine)  5,000 Units Subcutaneous Q12H    Lactobacillus acidophilus  1 capsule Oral Daily    metoprolol tartrate  25 mg Oral BID    multivitamin  1 tablet Oral Daily    umeclidinium-vilanteroL  1 puff Inhalation Daily    ursodioL  500 mg Oral BID    valsartan  160 mg  Oral Daily     PRN Medications:  albuterol-ipratropium, albuterol-ipratropium, aluminum-magnesium hydroxide-simethicone, amitriptyline, dextromethorphan-guaiFENesin  mg/5 ml, melatonin, ondansetron, polyethylene glycol, prochlorperazine, senna-docusate 8.6-50 mg, simethicone, sodium chloride 0.9%, traMADoL        Past Medical History:   Diagnosis Date    Arthritis     Back pain     scs implant- Dr ritter    Dyslipidemia     Hypertension     Liver disease     Spinal cord disorder        Past Surgical History:   Procedure Laterality Date    APPENDECTOMY      BACK SURGERY       SECTION      CHOLECYSTECTOMY      LAPAROSCOPIC REPAIR OF INCISIONAL HERNIA N/A 6/15/2022    Procedure: REPAIR, HERNIA, INCISIONAL, LAPAROSCOPIC;  Surgeon: Nayan Gonzalez MD;  Location: [facility];  Service: General;  Laterality: N/A;    NECK SURGERY      SHOULDER SURGERY Bilateral     TOTAL KNEE ARTHROPLASTY         Objective:     Input/output:    Intake/Output Summary (Last 24 hours) at 2023 1006  Last data filed at 2023 0040  Gross per 24 hour   Intake 390 ml   Output 250 ml   Net 140 ml         Vital Signs (Most Recent):  Temp: 98.2 °F (36.8 °C) (23)  Pulse: 60 (23)  Resp: 18 (23)  BP: (!) 148/74 (23 0916)  SpO2: 95 % (23)    Body mass index is 36.58 kg/m².  Weight: 79.4 kg (175 lb 0.7 oz) Vital Signs (24h Range):  Temp:  [98.1 °F (36.7 °C)-98.7 °F (37.1 °C)] 98.2 °F (36.8 °C)  Pulse:  [57-71] 60  Resp:  [18] 18  SpO2:  [94 %-96 %] 95 %  BP: (130-151)/(62-74) 148/74     Physical Exam  Constitutional:       Appearance: She is obese.   HENT:      Mouth/Throat:      Mouth: Mucous membranes are moist.      Pharynx: Oropharynx is clear.   Eyes:      Conjunctiva/sclera: Conjunctivae normal.      Pupils: Pupils are equal, round, and reactive to light.   Cardiovascular:      Rate and Rhythm: Normal rate and regular rhythm.   Pulmonary:      Breath sounds: Rhonchi (Few bilateral  coarse rhonchi posterior bases) present. No wheezing or rales.   Abdominal:      General: Bowel sounds are normal.      Palpations: Abdomen is soft.   Musculoskeletal:      Right lower leg: No edema.      Left lower leg: No edema.   Lymphadenopathy:      Cervical: No cervical adenopathy.   Skin:     General: Skin is warm and dry.   Neurological:      Mental Status: She is alert and oriented to person, place, and time.       Lines/Drains/Airways       Peripheral Intravenous Line  Duration                  Peripheral IV - Single Lumen 01/20/23 1415 22 G Anterior;Left Wrist <1 day                  Significant Labs:    Lab Results   Component Value Date    WBC 7.4 01/21/2023    HGB 10.2 (L) 01/21/2023    HCT 29.7 (L) 01/21/2023    MCV 88.7 01/21/2023     01/21/2023         Recent Labs   Lab 01/21/23  0639   *   K 4.3   CO2 26   BUN 7.2*   CREATININE 0.60   CALCIUM 8.5   MG 1.90   AST 22   ALT 15   ALKPHOS 101   ALBUMIN 2.4*       Imaging:  CT Abdomen Pelvis  Without Contrast  Narrative: EXAMINATION:  CT ABDOMEN PELVIS WITHOUT CONTRAST    CLINICAL HISTORY:  RULE OUT OCCULT MALIGNANCY;    TECHNIQUE:  Low dose axial images, sagittal and coronal reformations were obtained from the lung bases to the pubic symphysis.  No contrast was administered.  Automatic exposure control is utilized to reduce patient radiation exposure.    COMPARISON:  05/15/2022    FINDINGS:  There is a moderate to large left-sided pleural effusion.  There are changes consistent with COPD in the lung bases bilaterally.  There is mild left lower lobe atelectasis.    The liver appears normal.  No obvious liver mass or lesion is seen.    The patient is status post cholecystectomy..    The pancreas appears normal.  No inflammatory changes are seen in the pancreatic region.    The spleen appears normal and adrenal glands appear normal.  No adrenal nodule is seen.    There are some simple cysts seen in the left kidney in the medullary region in  the midpole and in the lower pole.  This was seen on prior examination as well and appears unchanged.  The cyst in the midpole has a area of cortical calcification.  It is unchanged since prior examination.  No nephrolithiasis is seen.  No hydronephrosis is seen.  No hydroureter is seen.  No ureteral stone is seen.    No colitis is seen.  No diverticulitis is seen.  No appendicitis is seen.    No free air seen.  No free fluid is seen.  The urinary bladder appears normal.    The bones are diffusely osteoporotic.  Degenerative changes seen throughout the lumbar spine.  There is a compression fracture seen at the level of T10 which was seen on prior examination as well.  There is also an old compression fracture at L5 which has been fixed with kyphoplasty.  Impression: Left-sided pleural effusion and left lower lobe atelectasis.  Effusion is moderate to large in size.    Stable left renal cyst    Stable old fracture of T10 and L5    Electronically signed by: Sreekanth Nuno  Date:    01/20/2023  Time:    12:04       Assessment/Plan:     Neutrophil predominant serosanguineous exudative left pleural effusion, cytology negative for malignancy.  No culture growth.    Mobile, calcified echogenic structure on aortic valve by transthoracic echocardiogram- deemed non vegetative, THEODORA with no evidence of this.   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

## 2023-01-22 LAB
ALBUMIN SERPL-MCNC: 2.4 G/DL (ref 3.4–4.8)
ALBUMIN/GLOB SERPL: 0.6 RATIO (ref 1.1–2)
ALP SERPL-CCNC: 112 UNIT/L (ref 40–150)
ALT SERPL-CCNC: 13 UNIT/L (ref 0–55)
AST SERPL-CCNC: 20 UNIT/L (ref 5–34)
BASOPHILS # BLD AUTO: 0.05 X10(3)/MCL (ref 0–0.2)
BASOPHILS NFR BLD AUTO: 0.7 %
BILIRUBIN DIRECT+TOT PNL SERPL-MCNC: 0.4 MG/DL
BUN SERPL-MCNC: 5.9 MG/DL (ref 9.8–20.1)
CALCIUM SERPL-MCNC: 8.7 MG/DL (ref 8.4–10.2)
CHLORIDE SERPL-SCNC: 100 MMOL/L (ref 98–107)
CO2 SERPL-SCNC: 28 MMOL/L (ref 23–31)
CREAT SERPL-MCNC: 0.62 MG/DL (ref 0.55–1.02)
EOSINOPHIL # BLD AUTO: 0.74 X10(3)/MCL (ref 0–0.9)
EOSINOPHIL NFR BLD AUTO: 10.7 %
ERYTHROCYTE [DISTWIDTH] IN BLOOD BY AUTOMATED COUNT: 13.2 % (ref 11.5–17)
GFR SERPLBLD CREATININE-BSD FMLA CKD-EPI: >60 MLS/MIN/1.73/M2
GLOBULIN SER-MCNC: 3.9 GM/DL (ref 2.4–3.5)
GLUCOSE SERPL-MCNC: 89 MG/DL (ref 82–115)
HCT VFR BLD AUTO: 30.8 % (ref 37–47)
HGB BLD-MCNC: 10.4 GM/DL (ref 12–16)
IMM GRANULOCYTES # BLD AUTO: 0.04 X10(3)/MCL (ref 0–0.04)
IMM GRANULOCYTES NFR BLD AUTO: 0.6 %
LYMPHOCYTES # BLD AUTO: 1.46 X10(3)/MCL (ref 0.6–4.6)
LYMPHOCYTES NFR BLD AUTO: 21.2 %
MAGNESIUM SERPL-MCNC: 1.9 MG/DL (ref 1.6–2.6)
MCH RBC QN AUTO: 30.4 PG
MCHC RBC AUTO-ENTMCNC: 33.8 MG/DL (ref 33–36)
MCV RBC AUTO: 90.1 FL (ref 80–94)
MONOCYTES # BLD AUTO: 0.74 X10(3)/MCL (ref 0.1–1.3)
MONOCYTES NFR BLD AUTO: 10.7 %
NEUTROPHILS # BLD AUTO: 3.86 X10(3)/MCL (ref 2.1–9.2)
NEUTROPHILS NFR BLD AUTO: 56.1 %
NRBC BLD AUTO-RTO: 0 %
PLATELET # BLD AUTO: 230 X10(3)/MCL (ref 130–400)
PMV BLD AUTO: 10.4 FL (ref 7.4–10.4)
POTASSIUM SERPL-SCNC: 4.3 MMOL/L (ref 3.5–5.1)
PROT SERPL-MCNC: 6.3 GM/DL (ref 5.8–7.6)
RBC # BLD AUTO: 3.42 X10(6)/MCL (ref 4.2–5.4)
SODIUM SERPL-SCNC: 133 MMOL/L (ref 136–145)
WBC # SPEC AUTO: 6.9 X10(3)/MCL (ref 4.5–11.5)

## 2023-01-22 PROCEDURE — 99024 POSTOP FOLLOW-UP VISIT: CPT | Mod: POP,,, | Performed by: PHYSICIAN ASSISTANT

## 2023-01-22 PROCEDURE — 97530 THERAPEUTIC ACTIVITIES: CPT | Mod: CQ

## 2023-01-22 PROCEDURE — 63600175 PHARM REV CODE 636 W HCPCS: Performed by: INTERNAL MEDICINE

## 2023-01-22 PROCEDURE — 83735 ASSAY OF MAGNESIUM: CPT | Performed by: INTERNAL MEDICINE

## 2023-01-22 PROCEDURE — 25000242 PHARM REV CODE 250 ALT 637 W/ HCPCS: Performed by: INTERNAL MEDICINE

## 2023-01-22 PROCEDURE — 25000003 PHARM REV CODE 250: Performed by: INTERNAL MEDICINE

## 2023-01-22 PROCEDURE — 11000001 HC ACUTE MED/SURG PRIVATE ROOM

## 2023-01-22 PROCEDURE — 36415 COLL VENOUS BLD VENIPUNCTURE: CPT | Performed by: INTERNAL MEDICINE

## 2023-01-22 PROCEDURE — 80053 COMPREHEN METABOLIC PANEL: CPT | Performed by: INTERNAL MEDICINE

## 2023-01-22 PROCEDURE — 85025 COMPLETE CBC W/AUTO DIFF WBC: CPT | Performed by: INTERNAL MEDICINE

## 2023-01-22 PROCEDURE — 99024 PR POST-OP FOLLOW-UP VISIT: ICD-10-PCS | Mod: POP,,, | Performed by: PHYSICIAN ASSISTANT

## 2023-01-22 RX ORDER — MICONAZOLE NITRATE 2 %
POWDER (GRAM) TOPICAL 2 TIMES DAILY PRN
Status: DISCONTINUED | OUTPATIENT
Start: 2023-01-22 | End: 2023-01-27 | Stop reason: HOSPADM

## 2023-01-22 RX ORDER — ENOXAPARIN SODIUM 100 MG/ML
40 INJECTION SUBCUTANEOUS EVERY 24 HOURS
Status: DISCONTINUED | OUTPATIENT
Start: 2023-01-22 | End: 2023-01-23

## 2023-01-22 RX ADMIN — HEPARIN SODIUM 5000 UNITS: 5000 INJECTION, SOLUTION INTRAVENOUS; SUBCUTANEOUS at 09:01

## 2023-01-22 RX ADMIN — Medication 1 TABLET: at 09:01

## 2023-01-22 RX ADMIN — CHOLESTYRAMINE 4 G: 4 POWDER, FOR SUSPENSION ORAL at 09:01

## 2023-01-22 RX ADMIN — THERA TABS 1 TABLET: TAB at 09:01

## 2023-01-22 RX ADMIN — VALSARTAN 160 MG: 80 TABLET, FILM COATED ORAL at 09:01

## 2023-01-22 RX ADMIN — CHOLESTYRAMINE 4 G: 4 POWDER, FOR SUSPENSION ORAL at 08:01

## 2023-01-22 RX ADMIN — AMLODIPINE BESYLATE 10 MG: 5 TABLET ORAL at 09:01

## 2023-01-22 RX ADMIN — METOPROLOL TARTRATE 25 MG: 25 TABLET, FILM COATED ORAL at 09:01

## 2023-01-22 RX ADMIN — BENZONATATE 100 MG: 100 CAPSULE ORAL at 09:01

## 2023-01-22 RX ADMIN — BENZONATATE 100 MG: 100 CAPSULE ORAL at 03:01

## 2023-01-22 RX ADMIN — URSODIOL 500 MG: 250 TABLET ORAL at 09:01

## 2023-01-22 RX ADMIN — UMECLIDINIUM BROMIDE AND VILANTEROL TRIFENATATE 1 PUFF: 62.5; 25 POWDER RESPIRATORY (INHALATION) at 05:01

## 2023-01-22 RX ADMIN — URSODIOL 500 MG: 250 TABLET ORAL at 08:01

## 2023-01-22 RX ADMIN — BENZONATATE 100 MG: 100 CAPSULE ORAL at 08:01

## 2023-01-22 RX ADMIN — Medication 1 CAPSULE: at 09:01

## 2023-01-22 RX ADMIN — METOPROLOL TARTRATE 25 MG: 25 TABLET, FILM COATED ORAL at 08:01

## 2023-01-22 NOTE — CONSULTS
CT SURGERY PROGRESS NOTE  Purnima Higgins  82 y.o.  1940    Patients Procedure: * No surgery found *    Subjective  Interval History:     82-year-old female with a history of HTN, HLD, back pain with implanted stimulator who has been experiencing a cough x 3 weeks. She failed outpatient treatment by PCP having received doxycycline, prednisone and Levaquin.     Presented ED for further evaluation on 1/13/23. She also reported new onset swelling in her lower extremities data to presentation. CXR in ER revealed a small left pleural effusion, unchanged from previous imaging. CT of chest redemonstrated left pleural effusion. She was admitted to hospital medicine and pulmonary was consulted for evaluation of pleural effusion.    She underwent left thoracentesis on 01/16/23, removing ~300cc of thin, serosanguinous fluid.      Transthoracic echocardiogram 01/17/2023 with normal LVSF, EF 60%, grade 1 diastolic dysfunction.  There is a small mobile echogenic structure on the aortic valve with some calcification.    CT of the chest yesterday which reveals reaccumulation of the left pleural effusion with consolidation/atelectasis of the left lower lobe.  This appears essentially of little to no change in comparison to prior CT of the chest 01/13/2023, demonstrating a moderate sized left posterior basilar pleural effusion.    Dr Mott now consulted for L VATS    Surgery Tuesday       Review of Systems   Constitutional: Negative.    HENT: Negative.     Eyes: Negative.    Respiratory:  Positive for cough.    Cardiovascular:  Positive for chest pain.   Gastrointestinal: Negative.    Genitourinary: Negative.    Musculoskeletal: Negative.    Psychiatric/Behavioral: Negative.       Medication List  Infusions    Scheduled   amLODIPine  10 mg Oral Daily    benzonatate  100 mg Oral TID    calcium-vitamin D3  1 tablet Oral Daily    cholestyramine  1 packet Oral BID    heparin (porcine)  5,000 Units Subcutaneous Q12H    Lactobacillus  acidophilus  1 capsule Oral Daily    metoprolol tartrate  25 mg Oral BID    multivitamin  1 tablet Oral Daily    umeclidinium-vilanteroL  1 puff Inhalation Daily    ursodioL  500 mg Oral BID    valsartan  160 mg Oral Daily       Objective:  Recent Vitals:  Temp:  [98.2 °F (36.8 °C)-98.8 °F (37.1 °C)] 98.2 °F (36.8 °C)  Pulse:  [58-67] 58  Resp:  [18] 18  SpO2:  [92 %-95 %] 95 %  BP: (132-162)/(73-79) 153/73    Physical Exam  Constitutional:       Appearance: Normal appearance.   HENT:      Head: Normocephalic.      Nose: Nose normal.      Mouth/Throat:      Mouth: Mucous membranes are moist.   Eyes:      Extraocular Movements: Extraocular movements intact.      Pupils: Pupils are equal, round, and reactive to light.   Cardiovascular:      Rate and Rhythm: Normal rate and regular rhythm.      Pulses: Normal pulses.      Heart sounds: Normal heart sounds.   Pulmonary:      Effort: Pulmonary effort is normal.      Comments: Decreased left base  Abdominal:      General: Abdomen is flat. Bowel sounds are normal.   Musculoskeletal:         General: Normal range of motion.      Cervical back: Normal range of motion.   Neurological:      General: No focal deficit present.      Mental Status: She is alert and oriented to person, place, and time.   Psychiatric:         Mood and Affect: Mood normal.        I/O last 24 hrs:  Intake/Output - Last 3 Shifts         01/20 0700  01/21 0659 01/21 0700  01/22 0659 01/22 0700  01/23 0659    P.O. 240 420     I.V. (mL/kg) 150 (1.9)      IV Piggyback       Total Intake(mL/kg) 390 (4.9) 420 (5.3)     Urine (mL/kg/hr) 250 (0.1) 1750 (0.9)     Stool  0     Total Output 250 1750     Net +140 -1330            Urine Occurrence 2 x      Stool Occurrence 1 x 1 x             Labs  ABGs: No results for input(s): PH, PCO2, PO2, HCO3, POCSATURATED, BE in the last 48 hours.  BMP:   Recent Labs   Lab 01/22/23  0637   *   K 4.3   CO2 28   BUN 5.9*   CREATININE 0.62   CALCIUM 8.7   MG 1.90        CBC:   Recent Labs   Lab 01/21/23  0639   WBC 7.4   RBC 3.35*   HGB 10.2*   HCT 29.7*      MCV 88.7   MCH 30.4   MCHC 34.3       CMP:   Recent Labs   Lab 01/22/23  0637   CALCIUM 8.7   ALBUMIN 2.4*   *   K 4.3   CO2 28   BUN 5.9*   CREATININE 0.62   ALKPHOS 112   ALT 13   AST 20   BILITOT 0.4           Imaging:   CT: CT Abdomen Pelvis  Without Contrast    Result Date: 1/20/2023  Left-sided pleural effusion and left lower lobe atelectasis.  Effusion is moderate to large in size. Stable left renal cyst Stable old fracture of T10 and L5 Electronically signed by: Sreekanth Nuno Date:    01/20/2023 Time:    12:04   CXR: No results found in the last 24 hours.  ECHO: I have reviewed all results within the past 24 hours and my personal findings are:  As per Dr Mott     Transesophageal echo (THEODORA)  Procedure: THEODORA      Final impression:     LV systolic function 60-65%.  No intracardiac  Vegetation.  Small immobile round echodensity seen attached to the non-coronary cusp of   the aortic valve, related to AV calcification and not vegetation.  Mild thickening of the MV associated with Mild-moderate MR.  No intracardiac thrombus is present in this study.  Left Pleural effusion is present, this can be better assessed by dedicated   Chest Imaging.     Indication: abnormal echocardiogram     Informed consent: obtained, signed copy placed in the chart.     Description of the procedure: After sedation was achieved (please see   anesthesia report for details), the esophagus intubated without   difficulties. Multiple orthogonal views obtained. Patient tolerated   procedure without immediate complications noted.     Findings:  Left atrium (LA): Normal LA size.  Left atrial appendage (PETER): No PETER thrombus is present.  Interatrial septum:  NO ASD or PFO noted on 2D or Color Doppler images.   Right atrium (RA): Normal RA size.  Left ventricle (LV): LVEF 60-65%.   Normal LV size.  Normal LV wall   thickness. No  Regional wall motion abnormalities noted.  Right ventricle (RV): Partially visualized in this study.  Aortic valve: Trileaflet. Small immobile round echodensity seen attached   to the non-coronary cusp of the aortic valve, related to AV calcification   and not vegetation. No Aortic stenosis.   Trace aortic regurgitation. No   Vegetation is present.  Mitral valve: Mild mitral annular calcification is present.  No Mitral   stenosis.  No Mitral valve prolapse. Mild-moderate Mitral regurgitation.   No Vegetation is present.  Tricuspid valve: Partially visualized, No Tricuspid stenosis. Trace-mild   Tricuspid regurgitation. No Vegetation is present.  Pulmonic valve: Not well visualized, no hemodynamically significant   pulmonic stenosis, no pulmonic regurgitation noted in this study. No   Vegetation is present.  Thoracic aorta: Scattered Atherosclerotic changes of the descending   thoracic aorta.  Normal Caliber aortic root.   Normal Caliber Proximal   portion of the ascending aorta.   Pericardium: No significant pericardial effusion is present.  Left Pleural effusion is present, this can be better assessed by dedicated Chest Imaging.      ASSESSMENT/PLAN:    Eval for left pleural effusion in progress per Dr Mott     Case and plan of care discussed with MD Julien Anthony PA-C

## 2023-01-22 NOTE — PT/OT/SLP PROGRESS
Physical Therapy Treatment    Patient Name:  Purnima Higgins   MRN:  [id number]    Recommendations:     Discharge Recommendations:  home, home with home health, home health PT   Discharge Equipment Recommendations: none     Subjective     Patient awake, alert, calm, and cooperative.     Communicated with NSG prior to session to see if Pt is appropriate for therapy today. Pt greeted and agreed to session.    Objective:     General Precautions: Standard, fall   Orthopedic Precautions:    Braces:    Respiratory Status: Nasal cannula, flow 3 L/min     Functional Mobility:    Bed Mobility: Min Assist  Sit to Stand: Min Assist  Transfers: Min Assist  Gait: 50 Ft. Min Assist   Equipment Used: Gait belt, Rolling walker    Assessment:     Purnima Higgins is a 82 y.o. female admitted with a medical diagnosis of Pleural effusion on left.     Treatment Encounter Note:  Patient actively participated with treatment today with no adverse effects. Patient performed supine to sitting EOB while working on her sitting upright EOB and completting therex for 20 reps with slight break. Pt ambulated 50  ft with CGA-Min A with a slow gait with 02 needed for amb and slight SOB noted and slow gait  Patient left up in chair with call button in reach and  present.    Rehab Prognosis: Fair; patient would benefit from acute skilled PT services to address these deficits and reach maximum level of function.    Recent Surgery: * No surgery found *    GOALS:   Multidisciplinary Problems       Physical Therapy Goals          Problem: Physical Therapy    Goal Priority Disciplines Outcome Goal Variances Interventions   Physical Therapy Goal     PT, PT/OT Ongoing, Progressing     Description: Goals to be met by: 2023     Patient will increase functional independence with mobility by performin. Supine to sit with [place]  2. Sit to stand transfer with Modified [place]  3. Gait  x 200 feet with Modified [place] using  Rolling Walker.                          Plan:     During this hospitalization, patient to be seen 6 x/week to address the identified rehab impairments via gait training, therapeutic activities, therapeutic exercises and progress toward the following goals:    Plan of Care Expires:       Billable Minutes     Billable Minutes: Therapeutic Activity 25       PT/PTA: PTA     PTA Visit Number: 3     01/22/2023

## 2023-01-22 NOTE — ASSESSMENT & PLAN NOTE
Patient has a recurrent left pleural effusion which was found to be serosanguineous on thoracentesis.  Elevated CA I 25 but no primary cancer diagnosis as of this time.  As the patient has already been treated with conservative measures and the suspicion of malignancy persists, I agree that video-assisted thoracoscopic surgery would be the best option for drainage of the pleural effusion and to allow for definitive evaluation for primary or metastatic carcinoma.  Will schedule left video-assisted thoracoscopic surgery and drainage of left pleural effusion with pleural biopsy on Tuesday January 24, 2023.  Risks, benefits, and alternatives have been discussed.  Questions have been answered.  The patient voices understanding and agrees to proceed.

## 2023-01-22 NOTE — PROGRESS NOTES
[facility]  HOSPITAL MEDICINE  PROGRESS NOTE        CHIEF COMPLAINT   Hospital follow up    HOSPITAL COURSE   82-year-old female with significant history of HTN, HLD, chronic back pain status post spinal stimulator placement presented to the ED with worsening cough x3 weeks.  Patient was prescribed Levaquin, doxycycline, prednisone by PCP with no symptomatic improvement.  Also noted bilateral lower extremity swelling.  Given persistent complaints she decided to come to the ED.  Chest x-ray revealed a small left pleural effusion paid CT chest confirmed this.  Patient was admitted hospitalist medicine service.  Pulmonary was consulted for evaluation of pleural effusion.  Patient underwent left-sided thoracentesis on 01/16.  Echocardiogram on 01/17 with normal ejection fraction, grade 1 diastolic dysfunction.  Concern for small mobile echogenic structure on the aortic valve which needed further evaluation.  Cardiology consulted for GEOVANY.  Left-sided pleural effusion consistent with exudative effusion.  Cytology negative for malignancy and cultures negative.  Patient was initiated on Zosyn on admit which was being continued.  Patient remains hemodynamically stable.  No leukocytosis and afebrile.  Zosyn discontinued by pulmonology 1/19.  Repeat chest x-ray with persistent effusion and therefore a CT thorax was obtained which showed worsening effusion.  Geovany negative for vegetation.  Discussed with pulmonology, concerns for malignant effusion.  Tumor markers, CT abdomen/pelvis ordered.  Pulmonology recommended consulting CT surgery for possible VATS    Today  Seen examined this morning.  Feels comfortable on 2 L of oxygen.  No new issues.        OBJECTIVE/PHYSICAL EXAM     VITAL SIGNS (MOST RECENT):  Temp: 97.9 °F (36.6 °C) (01/22/23 0800)  Pulse: 63 (01/22/23 0800)  Resp: 20 (01/22/23 0800)  BP: (!) 160/79 (01/22/23 0800)  SpO2: 96 % (01/22/23 0800)   VITAL SIGNS (24 HOUR RANGE):  Temp:  [97.9  °F (36.6 °C)-98.8 °F (37.1 °C)] 97.9 °F (36.6 °C)  Pulse:  [58-67] 63  Resp:  [18-20] 20  SpO2:  [92 %-96 %] 96 %  BP: (132-162)/(73-79) 160/79   GENERAL: In no acute distress, afebrile  HEENT:  CHEST: Clear to auscultation bilaterally  HEART: S1, S2, no appreciable murmur  ABDOMEN: Soft, nontender, BS +  MSK: Warm, no lower extremity edema, no clubbing or cyanosis  NEUROLOGIC: Alert and oriented x4, moving all extremities with good strength   INTEGUMENTARY:  PSYCHIATRY:        ASSESSMENT/PLAN   Recurrent left-sided exudative pleural effusion  Left lower lobe community-acquired pneumonia-treated   Acute hypoxemic respiratory failure     History of:  Chronic migraine, chronic back pain status post stimulator, HTN, 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  Recommends VATS.    CT surgery following.  Planning for VATS on Tuesday.   Completed antibiotic course for pneumonia.    DVT prophylaxis:  Lovenox 40    Anticipated discharge and disposition:   __________________________________________________________________________    LABS/MICRO/MEDS/DIAGNOSTICS       LABS  Recent Labs     01/22/23  0637   *   K 4.3   CHLORIDE 100   CO2 28   BUN 5.9*   CREATININE 0.62   GLUCOSE 89   CALCIUM 8.7   ALKPHOS 112   AST 20   ALT 13   ALBUMIN 2.4*     Recent Labs     01/22/23  0637   WBC 6.9   RBC 3.42*   HCT 30.8*   MCV 90.1          MICROBIOLOGY  Microbiology Results (last 7 days)       Procedure Component Value Units Date/Time    Blood Culture [[id number]]  (Normal) Collected: 01/18/23 0747    Order Status: Completed Specimen: Blood Updated: 01/21/23 1100     CULTURE, BLOOD ([facility]) No Growth At 72 Hours    Blood Culture [[id number]]  (Normal) Collected: 01/18/23 0747    Order Status: Completed Specimen: Blood Updated: 01/21/23 1100     CULTURE, BLOOD ([facility]) No Growth At 72 Hours    CHARLES Prep [[id number]] Collected: 01/16/23 0916    Order Status: Completed  Specimen: Body Fluid from Pleural, Left Updated: 01/16/23 1017     KOH Prep No fungal elements seen    Mycobacteria and Nocardia Culture [[id number]] Collected: 01/16/23 0917    Order Status: Sent Specimen: Body Fluid from Pleural Fluid, Left Updated: 01/16/23 0917    Fungal Culture [[id number]] Collected: 01/16/23 0916    Order Status: Sent Specimen: Body Fluid from Pleural Fluid, Left Updated: 01/16/23 0916    Respiratory Culture [[id number]] Collected: 01/14/23 1219    Order Status: Completed Specimen: Sputum, Expectorated Updated: 01/16/23 0723     Respiratory Culture Normal respiratory prasanth     GRAM STAIN Quality 1+      Many Gram positive cocci               MEDICATIONS   amLODIPine  10 mg Oral Daily    benzonatate  100 mg Oral TID    calcium-vitamin D3  1 tablet Oral Daily    cholestyramine  1 packet Oral BID    heparin (porcine)  5,000 Units Subcutaneous Q12H    Lactobacillus acidophilus  1 capsule Oral Daily    metoprolol tartrate  25 mg Oral BID    multivitamin  1 tablet Oral Daily    umeclidinium-vilanteroL  1 puff Inhalation Daily    ursodioL  500 mg Oral BID    valsartan  160 mg Oral Daily         INFUSIONS         DIAGNOSTIC TESTS  US Pelvis Complete Non OB   Final Result      The bilateral ovaries appear trophic.  No acute sonographic abnormality appreciated.  No transvaginal imaging performed limiting evaluation.         Electronically signed by: Maury Mustafa   Date:    01/21/2023   Time:    12:13      CT Abdomen Pelvis  Without Contrast   Final Result      Left-sided pleural effusion and left lower lobe atelectasis.  Effusion is moderate to large in size.      Stable left renal cyst      Stable old fracture of T10 and L5         Electronically signed by: Sreekanth Nuno   Date:    01/20/2023   Time:    12:04      CT Chest Without Contrast   Final Result      Worsening left-sided pleural effusion      Left lower lobe atelectasis      COPD         Electronically signed by: Sreekanth Nuno    Date:    01/19/2023   Time:    17:17      X-Ray Chest PA And Lateral   Final Result      No significant change as compared with the previous exam.      Changes of left-sided pleural effusion         Electronically signed by: Joseluis Lewis   Date:    01/19/2023   Time:    08:55      X-Ray Chest Lateral Decubitus Left   Final Result      Layering of pleural effusion         Electronically signed by: Joseluis Lewis   Date:    01/19/2023   Time:    08:54      X-Ray Chest 1 View   Final Result      No significant change         Electronically signed by: Joseluis Lewis   Date:    01/17/2023   Time:    08:35      X-Ray Chest 1 View   Final Result      No change since previous         Electronically signed by: Sreekanth Nuno   Date:    01/16/2023   Time:    10:45      X-Ray Chest PA And Lateral   Final Result      Larger left pleural effusion, now moderate to large volume.         Electronically signed by: Aman Thomas   Date:    01/14/2023   Time:    10:32      US Non Rad Thoracentesis with Imaging, Aspiration Only   Final Result      CT Chest With Contrast   Final Result      1. Left lower lung lobe complete collapse consolidation.      2. Moderate volume left pleural effusion and trace of right pleural effusion      3. Chronic appearing compression deformity of T11 vertebral body.         Electronically signed by: Mikie Sanon   Date:    01/13/2023   Time:    22:34      X-Ray Chest 1 View   Final Result      Unchanged small left pleural effusion.         Electronically signed by: Rosa Butler   Date:    01/13/2023   Time:    11:48           EF   Date Value Ref Range Status   01/17/2023 60 % Final              Case related differential diagnoses have been reviewed; assessment and plan has been documented. I have personally reviewed the labs and test results that are currently available; I have reviewed the patients medication list. I have reviewed the consulting providers recommendations. I have  reviewed or attempted to review medical records based upon their availability.  All of the patient's and/or family's questions have been addressed and answered to the best of my ability.  I will continue to monitor closely and make adjustments to medical management as needed.  This document was created using [employer]*Modal Fluency Direct.  Transcription errors may have been made.  Please contact me if any questions may rise regarding documentation to clarify transcription.        Manpreet Sofia MD   01/22/2023   Internal Medicine

## 2023-01-22 NOTE — PROGRESS NOTES
[facility]  Pulmonary/Critical Care - Medicine  Progress Note    Patient Name: Purnima Higgins  MRN: [id number]  Admission Date: 1/13/2023  Hospital Length of Stay: 9 days  Code Status: Full Code  Attending Provider: Ching Monzon MD  Primary Care Provider: Jos Briseno MD     Subjective:     HPI:   This is an 82-year-old female with a history of HTN, HLD, back pain with implanted stimulator who has been experiencing a cough x 3 weeks. She failed outpatient treatment by PCP having received doxycycline, prednisone and Levaquin. She was then referred to the ED for further evaluation on 1/13/23. She also reported new onset swelling in her lower extremities data to presentation. CXR in ER revealed a small left pleural effusion, unchanged from previous imaging. CT of chest redemonstrated left pleural effusion. She was admitted to hospital medicine and pulmonary was consulted for evaluation of pleural effusion.  She underwent left thoracentesis on 01/16/23, removing ~300cc of thin, serosanguinous fluid, cytology negative.  Transthoracic echocardiogram 01/17/2023 with normal LVSF, EF 60%, grade 1 diastolic dysfunction.  There is a small mobile echogenic structure on the aortic valve with some calcification however THEODORA 1/19 with no evidence of mobile valvular structure.     Interval History/Significant Events:   Pleural fluid has continues to re accumulate and CV surgery now consulted for VATS. CA-125 significantly elevated, pelvic US was normal only revealing atrophic ovaries.     Scheduled Medications:   amLODIPine  10 mg Oral Daily    benzonatate  100 mg Oral TID    calcium-vitamin D3  1 tablet Oral Daily    cholestyramine  1 packet Oral BID    heparin (porcine)  5,000 Units Subcutaneous Q12H    Lactobacillus acidophilus  1 capsule Oral Daily    metoprolol tartrate  25 mg Oral BID    multivitamin  1 tablet Oral Daily    umeclidinium-vilanteroL  1 puff Inhalation Daily    ursodioL   500 mg Oral BID    valsartan  160 mg Oral Daily     PRN Medications:  albuterol-ipratropium, albuterol-ipratropium, aluminum-magnesium hydroxide-simethicone, amitriptyline, dextromethorphan-guaiFENesin  mg/5 ml, melatonin, ondansetron, polyethylene glycol, prochlorperazine, senna-docusate 8.6-50 mg, simethicone, sodium chloride 0.9%, traMADoL        Past Medical History:   Diagnosis Date    Arthritis     Back pain     scs implant- Dr ritter    Dyslipidemia     Hypertension     Liver disease     Spinal cord disorder        Past Surgical History:   Procedure Laterality Date    APPENDECTOMY      BACK SURGERY       SECTION      CHOLECYSTECTOMY      LAPAROSCOPIC REPAIR OF INCISIONAL HERNIA N/A 6/15/2022    Procedure: REPAIR, HERNIA, INCISIONAL, LAPAROSCOPIC;  Surgeon: Nayan Gonzalez MD;  Location: [facility];  Service: General;  Laterality: N/A;    NECK SURGERY      SHOULDER SURGERY Bilateral     TOTAL KNEE ARTHROPLASTY         Objective:     Input/output:    Intake/Output Summary (Last 24 hours) at 2023 1002  Last data filed at 2023 0531  Gross per 24 hour   Intake 420 ml   Output 1750 ml   Net -1330 ml         Vital Signs (Most Recent):  Temp: 98.2 °F (36.8 °C) (23)  Pulse: (!) 58 (23)  Resp: 18 (23)  BP: (!) 153/73 (23)  SpO2: 95 % (23)    Body mass index is 36.58 kg/m².  Weight: 79.4 kg (175 lb 0.7 oz) Vital Signs (24h Range):  Temp:  [98.2 °F (36.8 °C)-98.8 °F (37.1 °C)] 98.2 °F (36.8 °C)  Pulse:  [58-67] 58  Resp:  [18] 18  SpO2:  [92 %-95 %] 95 %  BP: (132-162)/(73-79) 153/73     Physical Exam  Constitutional:       Appearance: She is obese.   HENT:      Mouth/Throat:      Mouth: Mucous membranes are moist.      Pharynx: Oropharynx is clear.   Eyes:      Conjunctiva/sclera: Conjunctivae normal.      Pupils: Pupils are equal, round, and reactive to light.   Cardiovascular:      Rate and Rhythm: Normal rate and regular rhythm.    Pulmonary:      Breath sounds: Rhonchi (Few bilateral coarse rhonchi posterior bases) present. No wheezing or rales.   Abdominal:      General: Bowel sounds are normal.      Palpations: Abdomen is soft.   Musculoskeletal:      Right lower leg: No edema.      Left lower leg: No edema.   Lymphadenopathy:      Cervical: No cervical adenopathy.   Skin:     General: Skin is warm and dry.   Neurological:      Mental Status: She is alert and oriented to person, place, and time.       Lines/Drains/Airways       Peripheral Intravenous Line  Duration                  Peripheral IV - Single Lumen 01/20/23 1415 22 G Anterior;Left Wrist 1 day                  Significant Labs:    Lab Results   Component Value Date    WBC 6.9 01/22/2023    HGB 10.4 (L) 01/22/2023    HCT 30.8 (L) 01/22/2023    MCV 90.1 01/22/2023     01/22/2023         Recent Labs   Lab 01/22/23  0637   *   K 4.3   CO2 28   BUN 5.9*   CREATININE 0.62   CALCIUM 8.7   MG 1.90   AST 20   ALT 13   ALKPHOS 112   ALBUMIN 2.4*       Imaging:  US Pelvis Complete Non OB  Narrative: EXAMINATION:  US PELVIS COMPLETE NON OB    CLINICAL HISTORY:  Elevated , rule out ovarian cancer;    TECHNIQUE:  Transabdominal and transvaginal images of the pelvis were obtained. Images obtained in grayscale and color.    COMPARISON:  No prior imaging available for comparison.    FINDINGS:  The UTERUS measures 5 x 2.4 x 2.8 cm. The uterus is normal in size, shape and appearance for age and menstrual status.    The CERVIX is unremarkable.    The ENDOMETRIUM is normal in thickness and appearance for age and menstrual status, measuring 4 mm.    The RIGHT OVARY is atrophic as expected measuring 0.8 x 1.2 x 0.6 cm. No significant masses are noted.    The LEFT OVARY is atrophic as expected measuring 1.5 x 0.9 x 1.2 cm. No significant masses are noted.  Impression: The bilateral ovaries appear trophic.  No acute sonographic abnormality appreciated.  No transvaginal imaging  performed limiting evaluation.    Electronically signed by: Maury Mustafa  Date:    01/21/2023  Time:    12:13       Assessment/Plan:     Neutrophil predominant serosanguineous exudative left pleural effusion, cytology negative for malignancy.  No culture growth.    Mobile, calcified echogenic structure on aortic valve by transthoracic echocardiogram- deemed non vegetative, THEODORA with no evidence of this.   Reported history primary biliary cirrhosis  Hyponatremia  Obesity, BMI 36.6       Plan:  Completed antibiotics  VATS scheduled for Tuesday       DOUGIE Lynne  Pulmonary/Critical Care  [facility] - 5th Floor Med Surg

## 2023-01-22 NOTE — CONSULTS
[facility] - 5th Floor Med Surg  Cardiothoracic Surgery  Consult Note    Patient Name: Purnima Higgins  MRN: [id number]  Admission Date: 1/13/2023  Attending Physician: Ching Monzon MD  Referring Provider: Self, Aaareferral    Patient information was obtained from patient, ER records, and primary team.     Consults  Subjective:     Chief Complaint/Reason for Admission:  Increasing cough    History of Present Illness: This is an 82-year-old female with a history of HTN, HLD, back pain with implanted stimulator who has been experiencing a cough x 3 weeks. She failed outpatient treatment by PCP having received doxycycline, prednisone and Levaquin. She was then referred to the ED for further evaluation on 1/13/23. She also reported new onset swelling in her lower extremities data to presentation. CXR in ER revealed a small left pleural effusion, unchanged from previous imaging. CT of chest redemonstrated left pleural effusion. She was admitted to hospital medicine and pulmonary was consulted for evaluation of pleural effusion.  She underwent left thoracentesis on 01/16/23, removing ~300cc of thin, serosanguinous fluid, cytology negative.  Of note, the patient has a significantly elevated , although primary cancer diagnosis has not been established.    The patient denies fever, chills, nausea, vomiting, headache, chest pain, back pain, palpitations, or paroxysmal nocturnal dyspnea.  She does have some shortness of breath and dyspnea on exertion although she has a very large body habitus.    No current facility-administered medications on file prior to encounter.     Current Outpatient Medications on File Prior to Encounter   Medication Sig    valsartan (DIOVAN) 160 MG tablet Take 1 tablet (160 mg total) by mouth once daily.    albuterol (VENTOLIN HFA) 90 mcg/actuation inhaler Inhale 2 puffs into the lungs every 4 (four) hours as needed for Wheezing. Disp 1 inhaler    amitriptyline (ELAVIL) 25 MG  tablet Take 1-2 po qHS PRN headache    amLODIPine (NORVASC) 10 MG tablet Take 1 tablet (10 mg total) by mouth once daily.    aspirin (ECOTRIN) 81 MG EC tablet Take 81 mg by mouth.    calcium-vitamin D3 (OS-BIBIANA 500 + D3) 500 mg-5 mcg (200 unit) per tablet Take 1 tablet by mouth once daily.    cholestyramine (QUESTRAN) 4 gram packet TAKE 1 PACKET TWICE A DAY    coenzyme Q10 100 mg capsule Take 100 mg by mouth.    EScitalopram oxalate (LEXAPRO) 5 MG Tab Take 5 mg by mouth once daily.    guaiFENesin-codeine 100-10 mg/5 ml (TUSSI-ORGANIDIN NR)  mg/5 mL syrup 5-10mL po q4h prn cough    ibandronate (BONIVA) 150 mg tablet Take 1 tablet (150 mg total) by mouth every 30 days. for 12 doses    L.acidophilus-Bifido.longum 1 billion cell CpDR Take 1 capsule by mouth once daily.    levoFLOXacin (LEVAQUIN) 500 MG tablet Take 1.5 tablets (750 mg total) by mouth once daily.    multivitamin (THERAGRAN) per tablet Take 1 tablet by mouth once daily.    oxybutynin (DITROPAN-XL) 10 MG 24 hr tablet Take 1 tablet (10 mg total) by mouth once daily.    umeclidinium-vilanteroL (ANORO ELLIPTA) 62.5-25 mcg/actuation DsDv Inhale 1 puff into the lungs Daily. Controller    ursodioL (ACTIGALL) 250 mg Tab Take by mouth.       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 NOAH Gonzáles    Mucinex [guaifenesin] Rash       Past Medical History:   Diagnosis Date    Arthritis     Back pain     scs implant- Dr ritter    Dyslipidemia     Hypertension     Liver disease     Spinal cord disorder      Past Surgical History:   Procedure Laterality Date    APPENDECTOMY      BACK SURGERY       SECTION      CHOLECYSTECTOMY      LAPAROSCOPIC REPAIR OF INCISIONAL HERNIA N/A 6/15/2022    Procedure: REPAIR, HERNIA, INCISIONAL, LAPAROSCOPIC;  Surgeon: Nayan Gonzalez MD;  Location: [facility] OR;  Service: General;  Laterality: N/A;    NECK SURGERY      SHOULDER  SURGERY Bilateral     TOTAL KNEE ARTHROPLASTY       Family History       Problem Relation (Age of Onset)    Breast cancer Sister    Colon cancer Maternal Grandfather    Stroke Mother, Sister    Thyroid disease Sister          Tobacco Use    Smoking status: Never    Smokeless tobacco: Never   Substance and Sexual Activity    Alcohol use: Yes     Comment: 1 beer per weekend/  4 shots of whiskey per weekend    Drug use: Never    Sexual activity: Not Currently     Review of Systems   Constitutional:  Negative for chills, diaphoresis, fatigue and fever.   HENT:  Negative for dental problem.    Respiratory:  Negative for cough, chest tightness, shortness of breath, wheezing and stridor.    Cardiovascular:  Negative for chest pain, palpitations and leg swelling.   Gastrointestinal:  Negative for abdominal distention, abdominal pain, constipation, diarrhea, nausea and vomiting.   Musculoskeletal:  Negative for back pain, neck pain and neck stiffness.   Skin:  Negative for wound.   Neurological:  Negative for dizziness, weakness, numbness and headaches.   Hematological:  Negative for adenopathy.   Objective:     Vital Signs (Most Recent):  Temp: 98.2 °F (36.8 °C) (01/22/23 0345)  Pulse: (!) 58 (01/22/23 0345)  Resp: 18 (01/22/23 0345)  BP: (!) 153/73 (01/22/23 0345)  SpO2: 95 % (01/22/23 0345)   Vital Signs (24h Range):  Temp:  [98.2 °F (36.8 °C)-98.8 °F (37.1 °C)] 98.2 °F (36.8 °C)  Pulse:  [58-67] 58  Resp:  [18] 18  SpO2:  [92 %-95 %] 95 %  BP: (132-162)/(73-79) 153/73     Weight: 79.4 kg (175 lb 0.7 oz)  Body mass index is 36.58 kg/m².    SpO2: 95 %        Intake/Output - Last 3 Shifts         01/20 0700  01/21 0659 01/21 0700 01/22 0659 01/22 0700 01/23 0659    P.O. 240 420     I.V. (mL/kg) 150 (1.9)      IV Piggyback       Total Intake(mL/kg) 390 (4.9) 420 (5.3)     Urine (mL/kg/hr) 250 (0.1) 1750 (0.9)     Stool  0     Total Output 250 1750     Net +140 -1330            Urine Occurrence 2 x      Stool Occurrence 1 x  1 x              Lines/Drains/Airways       Peripheral Intravenous Line  Duration                  Peripheral IV - Single Lumen 01/20/23 1415 22 G Anterior;Left Wrist 1 day                     STS Risk Score: n/a    Physical Exam  Vitals and nursing note reviewed.   Constitutional:       General: She is not in acute distress.     Appearance: Normal appearance. She is obese.   HENT:      Head: Normocephalic and atraumatic.      Nose: Nose normal. No congestion.      Mouth/Throat:      Mouth: Mucous membranes are moist.   Eyes:      General: No scleral icterus.     Extraocular Movements: Extraocular movements intact.      Conjunctiva/sclera: Conjunctivae normal.      Pupils: Pupils are equal, round, and reactive to light.   Neck:      Thyroid: No thyroid mass or thyromegaly.      Vascular: No carotid bruit or JVD.   Cardiovascular:      Rate and Rhythm: Normal rate and regular rhythm.      Pulses: Normal pulses.           Carotid pulses are 2+ on the right side and 2+ on the left side.       Radial pulses are 2+ on the right side and 2+ on the left side.        Femoral pulses are 2+ on the right side and 2+ on the left side.       Dorsalis pedis pulses are 2+ on the right side and 2+ on the left side.        Posterior tibial pulses are 2+ on the right side and 2+ on the left side.      Heart sounds: No murmur heard.    No friction rub. No gallop.   Pulmonary:      Effort: Pulmonary effort is normal. No respiratory distress.      Breath sounds: No stridor. Examination of the left-middle field reveals decreased breath sounds. Examination of the left-lower field reveals decreased breath sounds. Decreased breath sounds, rhonchi and rales present. No wheezing.   Chest:      Chest wall: No tenderness.   Abdominal:      General: Abdomen is flat. Bowel sounds are normal. There is no distension.      Palpations: Abdomen is soft. There is no hepatomegaly, splenomegaly, mass or pulsatile mass.      Tenderness: There is no  abdominal tenderness. There is no rebound.   Musculoskeletal:         General: No swelling. Normal range of motion.      Cervical back: Normal range of motion. No rigidity or tenderness.      Right lower leg: No edema.      Left lower leg: No edema.   Lymphadenopathy:      Cervical: No cervical adenopathy.      Upper Body:      Right upper body: No supraclavicular or axillary adenopathy.      Left upper body: No supraclavicular or axillary adenopathy.   Skin:     General: Skin is warm and dry.   Neurological:      General: No focal deficit present.      Mental Status: She is alert and oriented to person, place, and time. Mental status is at baseline.      Cranial Nerves: No cranial nerve deficit.      Sensory: No sensory deficit.      Motor: No weakness.      Gait: Gait normal.   Psychiatric:         Mood and Affect: Mood normal.       Significant Labs:  BMP:   Recent Labs   Lab 01/21/23  0639   *   K 4.3   CO2 26   BUN 7.2*   CREATININE 0.60   CALCIUM 8.5   MG 1.90     CBC:   Recent Labs   Lab 01/21/23  0639   WBC 7.4   RBC 3.35*   HGB 10.2*   HCT 29.7*      MCV 88.7   MCH 30.4   MCHC 34.3     All pertinent labs from the last 24 hours have been reviewed.    Significant Diagnostics:  CT: I have reviewed all pertinent results/findings within the past 24 hours  CXR: I have reviewed all pertinent results/findings within the past 24 hours  Echo: I have reviewed all pertinent results/findings within the past 24 hours  I have reviewed and interpreted all pertinent imaging results/findings within the past 24 hours.    Assessment/Plan:     NYHA Score: NYHA II: slight limitation of physical activity, comfortable at rest    Active Diagnoses:    Diagnosis Date Noted POA    PRINCIPAL PROBLEM:  Pleural effusion on left [J90] 01/14/2023 Yes      Problems Resolved During this Admission:     A/P: Patient has a recurrent left pleural effusion which was found to be serosanguineous on thoracentesis.  Elevated CA I 25 but  no primary cancer diagnosis as of this time.  As the patient has already been treated with conservative measures and the suspicion of malignancy persists, I agree that video-assisted thoracoscopic surgery would be the best option for drainage of the pleural effusion and to allow for definitive evaluation for primary or metastatic carcinoma.  Will schedule left video-assisted thoracoscopic surgery and drainage of left pleural effusion with pleural biopsy on Tuesday January 24, 2023.  Risks, benefits, and alternatives have been discussed.  Questions have been answered.  The patient voices understanding and agrees to proceed.    Thank you for your consult. I will follow-up with patient. Please contact us if you have any additional questions.    Augie Mott MD  Cardiothoracic Surgery  [facility] - 5th Floor Med Surg

## 2023-01-23 ENCOUNTER — ANESTHESIA EVENT (OUTPATIENT)
Dept: SURGERY | Facility: HOSPITAL | Age: 83
DRG: 166 | End: 2023-01-23
Payer: MEDICARE

## 2023-01-23 LAB
BACTERIA BLD CULT: NORMAL
BACTERIA BLD CULT: NORMAL

## 2023-01-23 PROCEDURE — 25000003 PHARM REV CODE 250: Performed by: INTERNAL MEDICINE

## 2023-01-23 PROCEDURE — 97116 GAIT TRAINING THERAPY: CPT

## 2023-01-23 PROCEDURE — 99024 PR POST-OP FOLLOW-UP VISIT: ICD-10-PCS | Mod: POP,,, | Performed by: PHYSICIAN ASSISTANT

## 2023-01-23 PROCEDURE — 97535 SELF CARE MNGMENT TRAINING: CPT | Mod: CO

## 2023-01-23 PROCEDURE — 99024 POSTOP FOLLOW-UP VISIT: CPT | Mod: POP,,, | Performed by: PHYSICIAN ASSISTANT

## 2023-01-23 PROCEDURE — 11000001 HC ACUTE MED/SURG PRIVATE ROOM

## 2023-01-23 PROCEDURE — 27000221 HC OXYGEN, UP TO 24 HOURS

## 2023-01-23 PROCEDURE — 25000242 PHARM REV CODE 250 ALT 637 W/ HCPCS: Performed by: INTERNAL MEDICINE

## 2023-01-23 RX ORDER — MUPIROCIN 20 MG/G
OINTMENT TOPICAL
Status: CANCELLED | OUTPATIENT
Start: 2023-01-23

## 2023-01-23 RX ORDER — ENOXAPARIN SODIUM 100 MG/ML
40 INJECTION SUBCUTANEOUS EVERY 24 HOURS
Status: DISCONTINUED | OUTPATIENT
Start: 2023-01-24 | End: 2023-01-27 | Stop reason: HOSPADM

## 2023-01-23 RX ADMIN — METOPROLOL TARTRATE 25 MG: 25 TABLET, FILM COATED ORAL at 09:01

## 2023-01-23 RX ADMIN — AMLODIPINE BESYLATE 10 MG: 5 TABLET ORAL at 09:01

## 2023-01-23 RX ADMIN — TRAMADOL HYDROCHLORIDE 50 MG: 50 TABLET, COATED ORAL at 09:01

## 2023-01-23 RX ADMIN — VALSARTAN 160 MG: 80 TABLET, FILM COATED ORAL at 09:01

## 2023-01-23 RX ADMIN — CHOLESTYRAMINE 4 G: 4 POWDER, FOR SUSPENSION ORAL at 09:01

## 2023-01-23 RX ADMIN — THERA TABS 1 TABLET: TAB at 09:01

## 2023-01-23 RX ADMIN — Medication 1 CAPSULE: at 09:01

## 2023-01-23 RX ADMIN — Medication 1 TABLET: at 09:01

## 2023-01-23 RX ADMIN — URSODIOL 500 MG: 250 TABLET ORAL at 09:01

## 2023-01-23 RX ADMIN — BENZONATATE 100 MG: 100 CAPSULE ORAL at 03:01

## 2023-01-23 RX ADMIN — BENZONATATE 100 MG: 100 CAPSULE ORAL at 09:01

## 2023-01-23 RX ADMIN — UMECLIDINIUM BROMIDE AND VILANTEROL TRIFENATATE 1 PUFF: 62.5; 25 POWDER RESPIRATORY (INHALATION) at 05:01

## 2023-01-23 NOTE — PT/OT/SLP PROGRESS
Occupational Therapy  Treatment    Purnima Higgins   MRN: [id number]   Admitting Diagnosis: Pleural effusion on left    OT Date of Treatment: 01/23/23   OT Start Time: 0930  OT Stop Time: 0953  OT Total Time (min): 23 min      OT/BOB: BOB     BOB Visit Number: 4    General Precautions: Standard, fall  Orthopedic Precautions:    Braces:      Spiritual, Cultural Beliefs, Protestant Practices, Values that Affect Care: no    Subjective:  Communicated with RN prior to session.         Objective:       Functional Mobility:  Bed Mobility:   Supine to sit: Standby Assistance    Transfer Training:   Sit to stand:Stand-by Assistance with Rolling Walker from EOB  Toilet Transfer:  Pt Step Transfer with Stand-by Assistance with Rolling Walker from EOB to BR commode    Grooming:  Patient performed oral hygeine with Stand-by Assistance at standing at sink.      Patient left up in chair with all lines intact and call button in reach    ASSESSMENT:  Purnima Higgins is a 82 y.o. female with a medical diagnosis of Pleural effusion on left    Rehab potential is excellent    Activity tolerance: Excellent    Discharge recommendations: home with home health     Equipment recommendations:       GOALS:   Multidisciplinary Problems       Occupational Therapy Goals          Problem: Occupational Therapy    Goal Priority Disciplines Outcome Interventions   Occupational Therapy Goal     OT, PT/OT Ongoing, Progressing    Description: Goals to be met by: 1/30     Patient will increase functional independence with ADLs by performing:    LE Dressing with Modified Noel.  Grooming while standing at sink with Modified Noel.  Toileting from toilet with Modified Noel for hygiene and clothing management.   Bathing from  shower chair/bench with Modified Noel.  Toilet transfer to toilet with Modified Noel.                         Plan:  Patient to be seen 3 x/week, 5 x/week to address the above listed problems via  self-care/home management, therapeutic activities, therapeutic exercises  Plan of Care expires: 01/30/23  Plan of Care reviewed with: patient         01/23/2023

## 2023-01-23 NOTE — PROGRESS NOTES
CT SURGERY PROGRESS NOTE  Purnima Higgins  82 y.o.  1940    Patients Procedure: Procedure(s) (LRB):  VATS (VIDEO-ASSISTED THORACOSCOPIC SURGERY) (Left)    Subjective  Interval History: NAD    ROS    Medication List  Infusions    Scheduled   amLODIPine  10 mg Oral Daily    benzonatate  100 mg Oral TID    calcium-vitamin D3  1 tablet Oral Daily    cholestyramine  1 packet Oral BID    [START ON 1/24/2023] enoxaparin  40 mg Subcutaneous Daily    Lactobacillus acidophilus  1 capsule Oral Daily    metoprolol tartrate  25 mg Oral BID    multivitamin  1 tablet Oral Daily    umeclidinium-vilanteroL  1 puff Inhalation Daily    ursodioL  500 mg Oral BID    valsartan  160 mg Oral Daily       Objective:  Recent Vitals:  Temp:  [98.2 °F (36.8 °C)-99.1 °F (37.3 °C)] 98.2 °F (36.8 °C)  Pulse:  [58-74] 67  Resp:  [17-20] 17  SpO2:  [94 %-97 %] 95 %  BP: (135-163)/(70-84) 163/84    Physical Exam     I/O last 24 hrs:  Intake/Output - Last 3 Shifts         01/21 0700  01/22 0659 01/22 0700  01/23 0659 01/23 0700  01/24 0659    P.O. 420 240     I.V. (mL/kg)       Total Intake(mL/kg) 420 (5.3) 240 (3)     Urine (mL/kg/hr) 1750 (0.9) 1300 (0.7)     Stool 0 0     Total Output 1750 1300     Net -1330 -1060            Stool Occurrence 1 x 1 x             Labs  BMP:   Recent Labs   Lab 01/22/23  0637   *   K 4.3   CO2 28   BUN 5.9*   CREATININE 0.62   CALCIUM 8.7   MG 1.90     CBC:   Recent Labs   Lab 01/22/23  0637   WBC 6.9   RBC 3.42*   HGB 10.4*   HCT 30.8*      MCV 90.1   MCH 30.4   MCHC 33.8     CMP:   Recent Labs   Lab 01/22/23  0637   CALCIUM 8.7   ALBUMIN 2.4*   *   K 4.3   CO2 28   BUN 5.9*   CREATININE 0.62   ALKPHOS 112   ALT 13   AST 20   BILITOT 0.4         Imaging:   CXR: No results found in the last 24 hours.        ASSESSMENT/PLAN:    Left VATS tomorrow Dr Mott     Case and plan of care discussed with MD Julien Anthony PA-C

## 2023-01-23 NOTE — PROGRESS NOTES
[facility]  HOSPITAL MEDICINE  PROGRESS NOTE        CHIEF COMPLAINT   Hospital follow up    HOSPITAL COURSE   82-year-old female with significant history of HTN, HLD, chronic back pain status post spinal stimulator placement presented to the ED with worsening cough x3 weeks.  Patient was prescribed Levaquin, doxycycline, prednisone by PCP with no symptomatic improvement.  Also noted bilateral lower extremity swelling.  Given persistent complaints she decided to come to the ED.  Chest x-ray revealed a small left pleural effusion paid CT chest confirmed this.  Patient was admitted hospitalist medicine service.  Pulmonary was consulted for evaluation of pleural effusion.  Patient underwent left-sided thoracentesis on 01/16.  Echocardiogram on 01/17 with normal ejection fraction, grade 1 diastolic dysfunction.  Concern for small mobile echogenic structure on the aortic valve which needed further evaluation.  Cardiology consulted for GEOVANY.  Left-sided pleural effusion consistent with exudative effusion.  Cytology negative for malignancy and cultures negative.  Patient was initiated on Zosyn on admit which was being continued.  Patient remains hemodynamically stable.  No leukocytosis and afebrile.  Zosyn discontinued by pulmonology 1/19.  Repeat chest x-ray with persistent effusion and therefore a CT thorax was obtained which showed worsening effusion.  Geovany negative for vegetation.  Discussed with pulmonology, concerns for malignant effusion.  Tumor markers, CT abdomen/pelvis ordered.  Pulmonology recommended consulting CT surgery for possible VATS    Today  Seen examined this morning.  No complaints.  Planning for VATS tomorrow.        OBJECTIVE/PHYSICAL EXAM     VITAL SIGNS (MOST RECENT):  Temp: 98.2 °F (36.8 °C) (01/23/23 0700)  Pulse: 67 (01/23/23 0700)  Resp: 17 (01/23/23 0700)  BP: (!) 163/84 (01/23/23 0700)  SpO2: 95 % (01/23/23 0700)   VITAL SIGNS (24 HOUR RANGE):  Temp:  [98.2 °F  (36.8 °C)-99.1 °F (37.3 °C)] 98.2 °F (36.8 °C)  Pulse:  [58-74] 67  Resp:  [17-20] 17  SpO2:  [94 %-97 %] 95 %  BP: (135-163)/(70-84) 163/84   GENERAL: In no acute distress, afebrile  HEENT:  CHEST: Clear to auscultation bilaterally  HEART: S1, S2, no appreciable murmur  ABDOMEN: Soft, nontender, BS +  MSK: Warm, no lower extremity edema, no clubbing or cyanosis  NEUROLOGIC: Alert and oriented x4, moving all extremities with good strength   INTEGUMENTARY:  PSYCHIATRY:        ASSESSMENT/PLAN   Recurrent left-sided exudative pleural effusion  Left lower lobe community-acquired pneumonia-treated   Acute hypoxemic respiratory failure     History of:  Chronic migraine, chronic back pain status post stimulator, HTN, 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  Recommends VATS.    CT surgery following.  Planning for VATS on Tuesday.   Completed antibiotic course for pneumonia.    DVT prophylaxis:  Lovenox 40    Anticipated discharge and disposition:   __________________________________________________________________________    LABS/MICRO/MEDS/DIAGNOSTICS       LABS  Recent Labs     01/22/23  0637   *   K 4.3   CHLORIDE 100   CO2 28   BUN 5.9*   CREATININE 0.62   GLUCOSE 89   CALCIUM 8.7   ALKPHOS 112   AST 20   ALT 13   ALBUMIN 2.4*       Recent Labs     01/22/23  0637   WBC 6.9   RBC 3.42*   HCT 30.8*   MCV 90.1            MICROBIOLOGY  Microbiology Results (last 7 days)       Procedure Component Value Units Date/Time    Blood Culture [[id number]]  (Normal) Collected: 01/18/23 0747    Order Status: Completed Specimen: Blood Updated: 01/22/23 1100     CULTURE, BLOOD ([facility]) No Growth At 96 Hours    Blood Culture [[id number]]  (Normal) Collected: 01/18/23 0747    Order Status: Completed Specimen: Blood Updated: 01/22/23 1100     CULTURE, BLOOD ([facility]) No Growth At 96 Hours    CHARLES Prep [[id number]] Collected: 01/16/23 0916    Order Status: Completed  Specimen: Body Fluid from Pleural, Left Updated: 01/16/23 1017     KOH Prep No fungal elements seen    Mycobacteria and Nocardia Culture [[id number]] Collected: 01/16/23 0917    Order Status: Sent Specimen: Body Fluid from Pleural Fluid, Left Updated: 01/16/23 0917    Fungal Culture [[id number]] Collected: 01/16/23 0916    Order Status: Sent Specimen: Body Fluid from Pleural Fluid, Left Updated: 01/16/23 0916               MEDICATIONS   amLODIPine  10 mg Oral Daily    benzonatate  100 mg Oral TID    calcium-vitamin D3  1 tablet Oral Daily    cholestyramine  1 packet Oral BID    enoxaparin  40 mg Subcutaneous Daily    Lactobacillus acidophilus  1 capsule Oral Daily    metoprolol tartrate  25 mg Oral BID    multivitamin  1 tablet Oral Daily    umeclidinium-vilanteroL  1 puff Inhalation Daily    ursodioL  500 mg Oral BID    valsartan  160 mg Oral Daily         INFUSIONS         DIAGNOSTIC TESTS  US Pelvis Complete Non OB   Final Result      The bilateral ovaries appear trophic.  No acute sonographic abnormality appreciated.  No transvaginal imaging performed limiting evaluation.         Electronically signed by: Maury Mustafa   Date:    01/21/2023   Time:    12:13      CT Abdomen Pelvis  Without Contrast   Final Result      Left-sided pleural effusion and left lower lobe atelectasis.  Effusion is moderate to large in size.      Stable left renal cyst      Stable old fracture of T10 and L5         Electronically signed by: Sreekanth Nuno   Date:    01/20/2023   Time:    12:04      CT Chest Without Contrast   Final Result      Worsening left-sided pleural effusion      Left lower lobe atelectasis      COPD         Electronically signed by: Sreekanth Nuno   Date:    01/19/2023   Time:    17:17      X-Ray Chest PA And Lateral   Final Result      No significant change as compared with the previous exam.      Changes of left-sided pleural effusion         Electronically signed by: Joseluis Lewis    Date:    01/19/2023   Time:    08:55      X-Ray Chest Lateral Decubitus Left   Final Result      Layering of pleural effusion         Electronically signed by: Joseluis Lewis   Date:    01/19/2023   Time:    08:54      X-Ray Chest 1 View   Final Result      No significant change         Electronically signed by: Joseluis Lewis   Date:    01/17/2023   Time:    08:35      X-Ray Chest 1 View   Final Result      No change since previous         Electronically signed by: Sreekanth Nuno   Date:    01/16/2023   Time:    10:45      X-Ray Chest PA And Lateral   Final Result      Larger left pleural effusion, now moderate to large volume.         Electronically signed by: Aman Thomas   Date:    01/14/2023   Time:    10:32      US Non Rad Thoracentesis with Imaging, Aspiration Only   Final Result      CT Chest With Contrast   Final Result      1. Left lower lung lobe complete collapse consolidation.      2. Moderate volume left pleural effusion and trace of right pleural effusion      3. Chronic appearing compression deformity of T11 vertebral body.         Electronically signed by: Mikie Sanon   Date:    01/13/2023   Time:    22:34      X-Ray Chest 1 View   Final Result      Unchanged small left pleural effusion.         Electronically signed by: Rosa Butler   Date:    01/13/2023   Time:    11:48           EF   Date Value Ref Range Status   01/17/2023 60 % Final              Case related differential diagnoses have been reviewed; assessment and plan has been documented. I have personally reviewed the labs and test results that are currently available; I have reviewed the patients medication list. I have reviewed the consulting providers recommendations. I have reviewed or attempted to review medical records based upon their availability.  All of the patient's and/or family's questions have been addressed and answered to the best of my ability.  I will continue to monitor closely and make adjustments to  medical management as needed.  This document was created using M*Modal Fluency Direct.  Transcription errors may have been made.  Please contact me if any questions may rise regarding documentation to clarify transcription.        Manpreet Sofia MD   01/23/2023   Internal Medicine

## 2023-01-23 NOTE — PT/OT/SLP PROGRESS
Physical Therapy  Treatment    Purnima Higgins   MRN: [id number]   Admitting Diagnosis: Pleural effusion on left    PT Received On: 23  PT Start Time: 1330     PT Stop Time: 1345    PT Total Time (min): 15 min       Billable Minutes:  Gait Training 15             PTA Visit Number: 4       General Precautions: Standard, fall  Respiratory Status: Nasal cannula, flow 2 L/min    Spiritual, Cultural Beliefs, Church Practices, Values that Affect Care: no    Subjective:  Communicated with R prior to session.      Objective:   Pt sitting up in chair upon arrival to room    Functional Mobility:  Sit>std from chair w/ RW, CGA. PT ambulated 180' w/ RW, SBA for safety. No LOB. Pt returned to sitting up in bedside chair w/ all needs in reach      Patient left up in chair with all lines intact.    Assessment:  Purnima Higgins is a 82 y.o. female tolerated mobility well. Per chart, pt going for VATS tomorrow.    Rehab identified problem list/impairments: impaired endurance, impaired functional mobility    Rehab potential is good.    Activity tolerance: Good    Discharge recommendations: home, home with home health, home health PT      Barriers to discharge:      Equipment recommendations: none     GOALS:   Multidisciplinary Problems       Physical Therapy Goals          Problem: Physical Therapy    Goal Priority Disciplines Outcome Goal Variances Interventions   Physical Therapy Goal     PT, PT/OT Ongoing, Progressing     Description: Goals to be met by: 2023     Patient will increase functional independence with mobility by performin. Supine to sit with Ross  2. Sit to stand transfer with Modified Ross  3. Gait  x 200 feet with Modified Ross using Rolling Walker.                          PLAN:    Patient to be seen 6 x/week to address the above listed problems via gait training, therapeutic activities, therapeutic exercises  Plan of Care expires:    Plan of Care reviewed with:  patient         01/23/2023

## 2023-01-23 NOTE — PROGRESS NOTES
[facility] - 5th Floor Med Surg  Pulmonary/Critical Care - Medicine  Progress Note    Patient Name: Purnima Higgins  MRN: [id number]  Admission Date: 1/13/2023  Hospital Length of Stay: 10 days  Code Status: Full Code  Attending Provider: Ching Monzon MD  Primary Care Provider: Jos Briseno MD     Subjective:     HPI:   This is an 82-year-old female with a history of HTN, HLD, back pain with implanted stimulator who has been experiencing a cough x 3 weeks. She failed outpatient treatment by PCP having received doxycycline, prednisone and Levaquin. She was then referred to the ED for further evaluation on 1/13/23. She also reported new onset swelling in her lower extremities data to presentation. CXR in ER revealed a small left pleural effusion, unchanged from previous imaging. CT of chest redemonstrated left pleural effusion. She was admitted to hospital medicine and pulmonary was consulted for evaluation of pleural effusion.  She underwent left thoracentesis on 01/16/23, removing ~300cc of thin, serosanguinous fluid, cytology negative.  Transthoracic echocardiogram 01/17/2023 with normal LVSF, EF 60%, grade 1 diastolic dysfunction.  There is a small mobile echogenic structure on the aortic valve with some calcification however THEODORA 1/19 with no evidence of mobile valvular structure.       Interval History/Significant Events:   States that overall symptoms are stable. Scheduled for VATS tomorrow given recurrent pleural effusion.       Scheduled Medications:   amLODIPine  10 mg Oral Daily    benzonatate  100 mg Oral TID    calcium-vitamin D3  1 tablet Oral Daily    cholestyramine  1 packet Oral BID    [START ON 1/24/2023] enoxaparin  40 mg Subcutaneous Daily    Lactobacillus acidophilus  1 capsule Oral Daily    metoprolol tartrate  25 mg Oral BID    multivitamin  1 tablet Oral Daily    umeclidinium-vilanteroL  1 puff Inhalation Daily    ursodioL  500 mg Oral BID    valsartan  160 mg Oral Daily      PRN Medications:  albuterol-ipratropium, albuterol-ipratropium, aluminum-magnesium hydroxide-simethicone, amitriptyline, dextromethorphan-guaiFENesin  mg/5 ml, melatonin, miconazole NITRATE 2 %, ondansetron, polyethylene glycol, prochlorperazine, senna-docusate 8.6-50 mg, simethicone, sodium chloride 0.9%, traMADoL        Past Medical History:   Diagnosis Date    Arthritis     Back pain     scs implant- Dr ritter    Dyslipidemia     Hypertension     Liver disease     Spinal cord disorder        Past Surgical History:   Procedure Laterality Date    APPENDECTOMY      BACK SURGERY       SECTION      CHOLECYSTECTOMY      LAPAROSCOPIC REPAIR OF INCISIONAL HERNIA N/A 6/15/2022    Procedure: REPAIR, HERNIA, INCISIONAL, LAPAROSCOPIC;  Surgeon: Nayan Gonzalez MD;  Location: [facility];  Service: General;  Laterality: N/A;    NECK SURGERY      SHOULDER SURGERY Bilateral     TOTAL KNEE ARTHROPLASTY         14 point ROS reviewed and negative unless documented in the history of present illness.       Objective:       Physical Exam:  Gen- A/O, NAD  CV- RRR  Resp- scattered ronchi bilaterally, otherwise CTA; normal work of breathing   MSK- WWP, no LE edema  Neuro- A/Ox3, BYRON, no gross deficits  Psych- appropriate mood and affect       Input/output:    Intake/Output Summary (Last 24 hours) at 2023 1324  Last data filed at 2023 0800  Gross per 24 hour   Intake 200 ml   Output 700 ml   Net -500 ml         Vital Signs (Most Recent):  Temp: 98.8 °F (37.1 °C) (23 1057)  Pulse: 60 (23 105)  Resp: 18 (23 105)  BP: (!) 145/80 (23 1057)  SpO2: 95 % (23 1057)    Body mass index is 36.58 kg/m².  Weight: 79.4 kg (175 lb 0.7 oz) Vital Signs (24h Range):  Temp:  [98.2 °F (36.8 °C)-99.1 °F (37.3 °C)] 98.8 °F (37.1 °C)  Pulse:  [58-74] 60  Resp:  [17-18] 18  SpO2:  [94 %-97 %] 95 %  BP: (144-163)/(70-84) 145/80         Lines/Drains/Airways       Peripheral Intravenous Line  Duration                   Peripheral IV - Single Lumen 01/20/23 1415 22 G Anterior;Left Wrist 2 days                  Significant Labs:    Lab Results   Component Value Date    WBC 6.9 01/22/2023    HGB 10.4 (L) 01/22/2023    HCT 30.8 (L) 01/22/2023    MCV 90.1 01/22/2023     01/22/2023           Assessment/Plan:     Recurrent left pleural effusion  Fluid studies 01/16/2023 with transudative chemistry and negative cytology   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 Mott 01/23/2023  -will continue to follow pathology and culture results postoperatively          Sony Reyes MD  Pulmonary/Critical Care  [facility] - 5th Floor Med Surg

## 2023-01-24 ENCOUNTER — ANESTHESIA (OUTPATIENT)
Dept: SURGERY | Facility: HOSPITAL | Age: 83
DRG: 166 | End: 2023-01-24
Payer: MEDICARE

## 2023-01-24 LAB
ANION GAP SERPL CALC-SCNC: 6 MEQ/L
APTT PPP: 27.9 SECONDS (ref 23.2–33.7)
BASOPHILS # BLD AUTO: 0.07 X10(3)/MCL (ref 0–0.2)
BASOPHILS NFR BLD AUTO: 0.9 %
BUN SERPL-MCNC: 7.2 MG/DL (ref 9.8–20.1)
CALCIUM SERPL-MCNC: 8.3 MG/DL (ref 8.4–10.2)
CHLORIDE SERPL-SCNC: 98 MMOL/L (ref 98–107)
CO2 SERPL-SCNC: 26 MMOL/L (ref 23–31)
CREAT SERPL-MCNC: 0.53 MG/DL (ref 0.55–1.02)
CREAT/UREA NIT SERPL: 14
EOSINOPHIL # BLD AUTO: 0.72 X10(3)/MCL (ref 0–0.9)
EOSINOPHIL NFR BLD AUTO: 9.5 %
ERYTHROCYTE [DISTWIDTH] IN BLOOD BY AUTOMATED COUNT: 13.2 % (ref 11.5–17)
GFR SERPLBLD CREATININE-BSD FMLA CKD-EPI: >60 MLS/MIN/1.73/M2
GLUCOSE SERPL-MCNC: 88 MG/DL (ref 82–115)
GROUP & RH: NORMAL
HCT VFR BLD AUTO: 28.9 % (ref 37–47)
HGB BLD-MCNC: 9.9 GM/DL (ref 12–16)
IMM GRANULOCYTES # BLD AUTO: 0.07 X10(3)/MCL (ref 0–0.04)
IMM GRANULOCYTES NFR BLD AUTO: 0.9 %
INDIRECT COOMBS GEL: NORMAL
INR BLD: 1 (ref 0–1.3)
LYMPHOCYTES # BLD AUTO: 2.08 X10(3)/MCL (ref 0.6–4.6)
LYMPHOCYTES NFR BLD AUTO: 27.3 %
MCH RBC QN AUTO: 30.5 PG
MCHC RBC AUTO-ENTMCNC: 34.3 MG/DL (ref 33–36)
MCV RBC AUTO: 88.9 FL (ref 80–94)
MONOCYTES # BLD AUTO: 0.84 X10(3)/MCL (ref 0.1–1.3)
MONOCYTES NFR BLD AUTO: 11 %
NEUTROPHILS # BLD AUTO: 3.83 X10(3)/MCL (ref 2.1–9.2)
NEUTROPHILS NFR BLD AUTO: 50.4 %
NRBC BLD AUTO-RTO: 0 %
PLATELET # BLD AUTO: 253 X10(3)/MCL (ref 130–400)
PMV BLD AUTO: 10.3 FL (ref 7.4–10.4)
POTASSIUM SERPL-SCNC: 3.9 MMOL/L (ref 3.5–5.1)
PROTHROMBIN TIME: 13.1 SECONDS (ref 12.5–14.5)
RBC # BLD AUTO: 3.25 X10(6)/MCL (ref 4.2–5.4)
SODIUM SERPL-SCNC: 130 MMOL/L (ref 136–145)
WBC # SPEC AUTO: 7.6 X10(3)/MCL (ref 4.5–11.5)

## 2023-01-24 PROCEDURE — 25000003 PHARM REV CODE 250: Performed by: PHYSICIAN ASSISTANT

## 2023-01-24 PROCEDURE — 25000003 PHARM REV CODE 250: Performed by: THORACIC SURGERY (CARDIOTHORACIC VASCULAR SURGERY)

## 2023-01-24 PROCEDURE — 87075 CULTR BACTERIA EXCEPT BLOOD: CPT | Performed by: THORACIC SURGERY (CARDIOTHORACIC VASCULAR SURGERY)

## 2023-01-24 PROCEDURE — 71000015 HC POSTOP RECOV 1ST HR: Performed by: THORACIC SURGERY (CARDIOTHORACIC VASCULAR SURGERY)

## 2023-01-24 PROCEDURE — 32650 PR THORACOSCOPY SURG W/PLEURODESIS: ICD-10-PCS | Mod: LT,,, | Performed by: THORACIC SURGERY (CARDIOTHORACIC VASCULAR SURGERY)

## 2023-01-24 PROCEDURE — 21400001 HC TELEMETRY ROOM

## 2023-01-24 PROCEDURE — 32650 PR THORACOSCOPY SURG W/PLEURODESIS: ICD-10-PCS | Mod: AS,LT,, | Performed by: PHYSICIAN ASSISTANT

## 2023-01-24 PROCEDURE — 85610 PROTHROMBIN TIME: CPT | Performed by: INTERNAL MEDICINE

## 2023-01-24 PROCEDURE — 27201423 OPTIME MED/SURG SUP & DEVICES STERILE SUPPLY: Performed by: THORACIC SURGERY (CARDIOTHORACIC VASCULAR SURGERY)

## 2023-01-24 PROCEDURE — 86900 BLOOD TYPING SEROLOGIC ABO: CPT | Performed by: THORACIC SURGERY (CARDIOTHORACIC VASCULAR SURGERY)

## 2023-01-24 PROCEDURE — 25000003 PHARM REV CODE 250: Performed by: INTERNAL MEDICINE

## 2023-01-24 PROCEDURE — C1729 CATH, DRAINAGE: HCPCS | Performed by: THORACIC SURGERY (CARDIOTHORACIC VASCULAR SURGERY)

## 2023-01-24 PROCEDURE — 37000009 HC ANESTHESIA EA ADD 15 MINS: Performed by: THORACIC SURGERY (CARDIOTHORACIC VASCULAR SURGERY)

## 2023-01-24 PROCEDURE — 36000711: Performed by: THORACIC SURGERY (CARDIOTHORACIC VASCULAR SURGERY)

## 2023-01-24 PROCEDURE — 88341 IMHCHEM/IMCYTCHM EA ADD ANTB: CPT

## 2023-01-24 PROCEDURE — 32650 THORACOSCOPY W/PLEURODESIS: CPT | Mod: AS,LT,, | Performed by: PHYSICIAN ASSISTANT

## 2023-01-24 PROCEDURE — 87070 CULTURE OTHR SPECIMN AEROBIC: CPT | Performed by: THORACIC SURGERY (CARDIOTHORACIC VASCULAR SURGERY)

## 2023-01-24 PROCEDURE — 32650 THORACOSCOPY W/PLEURODESIS: CPT | Mod: LT,,, | Performed by: THORACIC SURGERY (CARDIOTHORACIC VASCULAR SURGERY)

## 2023-01-24 PROCEDURE — 85025 COMPLETE CBC W/AUTO DIFF WBC: CPT | Performed by: INTERNAL MEDICINE

## 2023-01-24 PROCEDURE — 88342 IMHCHEM/IMCYTCHM 1ST ANTB: CPT

## 2023-01-24 PROCEDURE — 36415 COLL VENOUS BLD VENIPUNCTURE: CPT | Performed by: INTERNAL MEDICINE

## 2023-01-24 PROCEDURE — 87206 SMEAR FLUORESCENT/ACID STAI: CPT | Performed by: THORACIC SURGERY (CARDIOTHORACIC VASCULAR SURGERY)

## 2023-01-24 PROCEDURE — 27000221 HC OXYGEN, UP TO 24 HOURS

## 2023-01-24 PROCEDURE — 63600175 PHARM REV CODE 636 W HCPCS: Performed by: THORACIC SURGERY (CARDIOTHORACIC VASCULAR SURGERY)

## 2023-01-24 PROCEDURE — 63600175 PHARM REV CODE 636 W HCPCS: Performed by: STUDENT IN AN ORGANIZED HEALTH CARE EDUCATION/TRAINING PROGRAM

## 2023-01-24 PROCEDURE — 63600175 PHARM REV CODE 636 W HCPCS: Performed by: INTERNAL MEDICINE

## 2023-01-24 PROCEDURE — 88305 TISSUE EXAM BY PATHOLOGIST: CPT

## 2023-01-24 PROCEDURE — 71000033 HC RECOVERY, INTIAL HOUR: Performed by: THORACIC SURGERY (CARDIOTHORACIC VASCULAR SURGERY)

## 2023-01-24 PROCEDURE — 87102 FUNGUS ISOLATION CULTURE: CPT | Performed by: THORACIC SURGERY (CARDIOTHORACIC VASCULAR SURGERY)

## 2023-01-24 PROCEDURE — 85730 THROMBOPLASTIN TIME PARTIAL: CPT | Performed by: INTERNAL MEDICINE

## 2023-01-24 PROCEDURE — 36000710: Performed by: THORACIC SURGERY (CARDIOTHORACIC VASCULAR SURGERY)

## 2023-01-24 PROCEDURE — 80048 BASIC METABOLIC PNL TOTAL CA: CPT | Performed by: INTERNAL MEDICINE

## 2023-01-24 PROCEDURE — 25000003 PHARM REV CODE 250: Performed by: STUDENT IN AN ORGANIZED HEALTH CARE EDUCATION/TRAINING PROGRAM

## 2023-01-24 PROCEDURE — 37000008 HC ANESTHESIA 1ST 15 MINUTES: Performed by: THORACIC SURGERY (CARDIOTHORACIC VASCULAR SURGERY)

## 2023-01-24 PROCEDURE — 63600175 PHARM REV CODE 636 W HCPCS: Performed by: ANESTHESIOLOGY

## 2023-01-24 PROCEDURE — 71000039 HC RECOVERY, EACH ADD'L HOUR: Performed by: THORACIC SURGERY (CARDIOTHORACIC VASCULAR SURGERY)

## 2023-01-24 PROCEDURE — 11000001 HC ACUTE MED/SURG PRIVATE ROOM

## 2023-01-24 RX ORDER — ROCURONIUM BROMIDE 10 MG/ML
INJECTION, SOLUTION INTRAVENOUS
Status: DISCONTINUED | OUTPATIENT
Start: 2023-01-24 | End: 2023-01-24

## 2023-01-24 RX ORDER — ONDANSETRON 2 MG/ML
INJECTION INTRAMUSCULAR; INTRAVENOUS
Status: DISCONTINUED | OUTPATIENT
Start: 2023-01-24 | End: 2023-01-24

## 2023-01-24 RX ORDER — MEPERIDINE HYDROCHLORIDE 25 MG/ML
12.5 INJECTION INTRAMUSCULAR; INTRAVENOUS; SUBCUTANEOUS ONCE
Status: ACTIVE | OUTPATIENT
Start: 2023-01-24 | End: 2023-01-25

## 2023-01-24 RX ORDER — MUPIROCIN 20 MG/G
1 OINTMENT TOPICAL 2 TIMES DAILY
Status: DISCONTINUED | OUTPATIENT
Start: 2023-01-24 | End: 2023-01-27 | Stop reason: HOSPADM

## 2023-01-24 RX ORDER — HYDROMORPHONE HYDROCHLORIDE 2 MG/ML
0.5 INJECTION, SOLUTION INTRAMUSCULAR; INTRAVENOUS; SUBCUTANEOUS EVERY 5 MIN PRN
Status: DISCONTINUED | OUTPATIENT
Start: 2023-01-24 | End: 2023-01-27 | Stop reason: HOSPADM

## 2023-01-24 RX ORDER — METOCLOPRAMIDE HYDROCHLORIDE 5 MG/ML
5 INJECTION INTRAMUSCULAR; INTRAVENOUS EVERY 6 HOURS PRN
Status: DISCONTINUED | OUTPATIENT
Start: 2023-01-24 | End: 2023-01-27 | Stop reason: HOSPADM

## 2023-01-24 RX ORDER — ONDANSETRON 2 MG/ML
4 INJECTION INTRAMUSCULAR; INTRAVENOUS ONCE
Status: DISCONTINUED | OUTPATIENT
Start: 2023-01-24 | End: 2023-01-27 | Stop reason: HOSPADM

## 2023-01-24 RX ORDER — FENTANYL CITRATE 50 UG/ML
INJECTION, SOLUTION INTRAMUSCULAR; INTRAVENOUS
Status: DISCONTINUED | OUTPATIENT
Start: 2023-01-24 | End: 2023-01-24

## 2023-01-24 RX ORDER — PROPOFOL 10 MG/ML
VIAL (ML) INTRAVENOUS
Status: DISCONTINUED | OUTPATIENT
Start: 2023-01-24 | End: 2023-01-24

## 2023-01-24 RX ORDER — HYDROMORPHONE HYDROCHLORIDE 2 MG/ML
INJECTION, SOLUTION INTRAMUSCULAR; INTRAVENOUS; SUBCUTANEOUS
Status: DISCONTINUED | OUTPATIENT
Start: 2023-01-24 | End: 2023-01-24

## 2023-01-24 RX ORDER — HYDROCODONE BITARTRATE AND ACETAMINOPHEN 5; 325 MG/1; MG/1
1 TABLET ORAL EVERY 4 HOURS PRN
Status: DISCONTINUED | OUTPATIENT
Start: 2023-01-24 | End: 2023-01-27 | Stop reason: HOSPADM

## 2023-01-24 RX ORDER — HYDROMORPHONE HYDROCHLORIDE 2 MG/ML
0.2 INJECTION, SOLUTION INTRAMUSCULAR; INTRAVENOUS; SUBCUTANEOUS EVERY 5 MIN PRN
Status: DISCONTINUED | OUTPATIENT
Start: 2023-01-24 | End: 2023-01-27 | Stop reason: HOSPADM

## 2023-01-24 RX ORDER — LIDOCAINE HCL/PF 100 MG/5ML
SYRINGE (ML) INTRAVENOUS
Status: DISCONTINUED | OUTPATIENT
Start: 2023-01-24 | End: 2023-01-24

## 2023-01-24 RX ORDER — DEXAMETHASONE SODIUM PHOSPHATE 4 MG/ML
INJECTION, SOLUTION INTRA-ARTICULAR; INTRALESIONAL; INTRAMUSCULAR; INTRAVENOUS; SOFT TISSUE
Status: DISCONTINUED | OUTPATIENT
Start: 2023-01-24 | End: 2023-01-24

## 2023-01-24 RX ORDER — ONDANSETRON 4 MG/1
8 TABLET, ORALLY DISINTEGRATING ORAL EVERY 8 HOURS PRN
Status: DISCONTINUED | OUTPATIENT
Start: 2023-01-24 | End: 2023-01-27 | Stop reason: HOSPADM

## 2023-01-24 RX ORDER — DIPHENHYDRAMINE HYDROCHLORIDE 50 MG/ML
25 INJECTION INTRAMUSCULAR; INTRAVENOUS EVERY 6 HOURS PRN
Status: DISCONTINUED | OUTPATIENT
Start: 2023-01-24 | End: 2023-01-27 | Stop reason: HOSPADM

## 2023-01-24 RX ORDER — PHENYLEPHRINE HYDROCHLORIDE 10 MG/ML
INJECTION INTRAVENOUS
Status: DISCONTINUED | OUTPATIENT
Start: 2023-01-24 | End: 2023-01-24

## 2023-01-24 RX ADMIN — MUPIROCIN 1 G: 20 OINTMENT TOPICAL at 08:01

## 2023-01-24 RX ADMIN — DEXAMETHASONE SODIUM PHOSPHATE 4 MG: 4 INJECTION, SOLUTION INTRA-ARTICULAR; INTRALESIONAL; INTRAMUSCULAR; INTRAVENOUS; SOFT TISSUE at 10:01

## 2023-01-24 RX ADMIN — URSODIOL 500 MG: 250 TABLET ORAL at 09:01

## 2023-01-24 RX ADMIN — HYDROMORPHONE HYDROCHLORIDE 0.2 MG: 2 INJECTION, SOLUTION INTRAMUSCULAR; INTRAVENOUS; SUBCUTANEOUS at 01:01

## 2023-01-24 RX ADMIN — HYDROMORPHONE HYDROCHLORIDE 0.2 MG: 2 INJECTION, SOLUTION INTRAMUSCULAR; INTRAVENOUS; SUBCUTANEOUS at 11:01

## 2023-01-24 RX ADMIN — HYDROMORPHONE HYDROCHLORIDE 0.2 MG: 2 INJECTION, SOLUTION INTRAMUSCULAR; INTRAVENOUS; SUBCUTANEOUS at 12:01

## 2023-01-24 RX ADMIN — SUGAMMADEX 159 MG: 100 INJECTION, SOLUTION INTRAVENOUS at 11:01

## 2023-01-24 RX ADMIN — TRAMADOL HYDROCHLORIDE 50 MG: 50 TABLET, COATED ORAL at 08:01

## 2023-01-24 RX ADMIN — HYDROCODONE BITARTRATE AND ACETAMINOPHEN 1 TABLET: 5; 325 TABLET ORAL at 06:01

## 2023-01-24 RX ADMIN — HYDROMORPHONE HYDROCHLORIDE 0.4 MG: 2 INJECTION, SOLUTION INTRAMUSCULAR; INTRAVENOUS; SUBCUTANEOUS at 11:01

## 2023-01-24 RX ADMIN — ROCURONIUM BROMIDE 50 MG: 10 INJECTION, SOLUTION INTRAVENOUS at 09:01

## 2023-01-24 RX ADMIN — TRAMADOL HYDROCHLORIDE 50 MG: 50 TABLET, COATED ORAL at 03:01

## 2023-01-24 RX ADMIN — CEFUROXIME SODIUM 1.5 G: 1.5 INJECTION, POWDER, FOR SOLUTION INTRAVENOUS at 10:01

## 2023-01-24 RX ADMIN — ENOXAPARIN SODIUM 40 MG: 40 INJECTION SUBCUTANEOUS at 05:01

## 2023-01-24 RX ADMIN — HYDROMORPHONE HYDROCHLORIDE 0.4 MG: 2 INJECTION, SOLUTION INTRAMUSCULAR; INTRAVENOUS; SUBCUTANEOUS at 10:01

## 2023-01-24 RX ADMIN — ONDANSETRON 4 MG: 2 INJECTION INTRAMUSCULAR; INTRAVENOUS at 11:01

## 2023-01-24 RX ADMIN — PHENYLEPHRINE HYDROCHLORIDE 100 MCG: 10 INJECTION INTRAVENOUS at 10:01

## 2023-01-24 RX ADMIN — PROPOFOL 140 MG: 10 INJECTION, EMULSION INTRAVENOUS at 09:01

## 2023-01-24 RX ADMIN — METOPROLOL TARTRATE 25 MG: 25 TABLET, FILM COATED ORAL at 08:01

## 2023-01-24 RX ADMIN — CHOLESTYRAMINE 4 G: 4 POWDER, FOR SUSPENSION ORAL at 09:01

## 2023-01-24 RX ADMIN — METOPROLOL TARTRATE 25 MG: 25 TABLET, FILM COATED ORAL at 09:01

## 2023-01-24 RX ADMIN — LIDOCAINE HYDROCHLORIDE 80 MG: 20 INJECTION, SOLUTION INTRAVENOUS at 09:01

## 2023-01-24 RX ADMIN — BENZONATATE 100 MG: 100 CAPSULE ORAL at 08:01

## 2023-01-24 RX ADMIN — FENTANYL CITRATE 100 MCG: 50 INJECTION, SOLUTION INTRAMUSCULAR; INTRAVENOUS at 09:01

## 2023-01-24 RX ADMIN — SODIUM CHLORIDE, SODIUM GLUCONATE, SODIUM ACETATE, POTASSIUM CHLORIDE AND MAGNESIUM CHLORIDE: 526; 502; 368; 37; 30 INJECTION, SOLUTION INTRAVENOUS at 09:01

## 2023-01-24 NOTE — ANESTHESIA PROCEDURE NOTES
Intubation    Date/Time: 2023 10:01 AM  Location procedure was performed: [facility] ANESTHESIOLOGY  Performed by: Ced Coreas MD  Authorized by: Gilma Griffith MD   Consent Done: Yes  Consent: Written consent obtained.  Consent given by: patient  Patient understanding: patient states understanding of the procedure being performed  Patient consent: the patient's understanding of the procedure matches consent given  Procedure consent: procedure consent matches procedure scheduled  Relevant documents: relevant documents present and verified  Test results: test results available and properly labeled  Site marked: the operative site was marked  Imaging studies: imaging studies available  Required items: required blood products, implants, devices, and special equipment available  Patient identity confirmed: , MRN, provided demographic data, name and verbally with patient  Indications: general anesthesia with lung isolation.  Intubation method: video-assisted  Preoxygenation: mask  Sedatives: propofol  Paralytic: rocuronium  Laryngoscope size: Glide 3  Tube size (mm): 35 F NERY.  Tube type: cuffed  Number of attempts: 1  Cricoid pressure: yes  Cords visualized: yes  Post-procedure assessment: ETCO2 monitor  Breath sounds: clear  Cuff inflated: yes  ETT to lip: 27 cm  Tube secured with: adhesive tape  Complications: No

## 2023-01-24 NOTE — OP NOTE
[facility] - Periop Services  Cardiothoracic Surgery  Operative Note    SUMMARY     Date of Procedure: 1/24/2023     Procedure:  Left video-assisted thoracoscopic surgery and talc pleurodesis    Surgeon: ROMAINE Mott     Assistant: Bryon Watkins    Pre-Operative Diagnosis:  1.  Recurrent left pleural effusion      Post-Operative Diagnosis:  Same    Anesthesia: General    Operative Findings (including complications, if any):  350 mL of clear tea-colored fluid    Description of Technical Procedures:  Patient was lived to the operating room, support lines were placed, general double-lumen endotracheal anesthesia was induced.  Patient was turned into the right lateral decubitus position with the left side up and prepped in usual sterile fashion.  Two 5 mm Thoracoports were placed in the left lateral chest wall and initial exploration revealed normal-appearing pleura with some filmy adhesions and approximately 350 mL of tea-colored fluid which was aspirated with the suction .  Cut pleurodesis was then performed.  Two hundred thirty-two Bulgarian chest tubes were placed in the left chest.  The patient tolerated the procedure well will be delivered to the recovery room in stable condition.  There was no blood loss.    Estimated Blood Loss (EBL):  Minimal mL          Specimens:   Specimen (24h ago, onward)       Start     Ordered    01/24/23 1052  Cytology, Fluid/Wash/[place]  RELEASE UPON ORDERING        Question:  Release to patient  Answer:  Immediate    01/24/23 1052                            Condition: Stable    Disposition: PACU - hemodynamically stable.

## 2023-01-24 NOTE — ANESTHESIA POSTPROCEDURE EVALUATION
Anesthesia Post Evaluation    Patient: Purnima Higgins    Procedure(s) Performed: Procedure(s) (LRB):  VATS (VIDEO-ASSISTED THORACOSCOPIC SURGERY) (Left)  PLEURODESIS, USING TALC (Left)    Final Anesthesia Type: general      Patient location during evaluation: PACU  Patient participation: Yes- Able to Participate  Level of consciousness: awake and alert  Post-procedure vital signs: reviewed and stable  Pain management: adequate  Airway patency: patent      Anesthetic complications: no      Cardiovascular status: hemodynamically stable  Respiratory status: unassisted  Hydration status: euvolemic  Follow-up not needed.          Vitals Value Taken Time   /63 01/24/23 1242   Temp 36.3 °C (97.4 °F) 01/24/23 1121   Pulse 54 01/24/23 1246   Resp 15 01/24/23 1246   SpO2 96 % 01/24/23 1246   Vitals shown include unvalidated device data.      No case tracking events are documented in the log.      Pain/Dora Score: Pain Rating Prior to Med Admin: 5 (1/24/2023 12:00 PM)  Dora Score: 9 (1/24/2023 12:30 PM)

## 2023-01-24 NOTE — PT/OT/SLP PROGRESS
Occupational Therapy      Patient Name:  Purnima Higgins   MRN:  [id number]    Patient not seen today secondary to Off the floor for procedure/surgery for VATS. Will follow-up as able.    0936  1/24/2023

## 2023-01-24 NOTE — PROGRESS NOTES
[facility]  HOSPITAL MEDICINE  PROGRESS NOTE        CHIEF COMPLAINT   Hospital follow up    HOSPITAL COURSE   82-year-old female with significant history of HTN, HLD, chronic back pain status post spinal stimulator placement presented to the ED with worsening cough x3 weeks.  Patient was prescribed Levaquin, doxycycline, prednisone by PCP with no symptomatic improvement.  Also noted bilateral lower extremity swelling.  Given persistent complaints she decided to come to the ED.  Chest x-ray revealed a small left pleural effusion paid CT chest confirmed this.  Patient was admitted hospitalist medicine service.  Pulmonary was consulted for evaluation of pleural effusion.  Patient underwent left-sided thoracentesis on 01/16.  Echocardiogram on 01/17 with normal ejection fraction, grade 1 diastolic dysfunction.  Concern for small mobile echogenic structure on the aortic valve which needed further evaluation.  Cardiology consulted for GEOVANY.  Left-sided pleural effusion consistent with exudative effusion.  Cytology negative for malignancy and cultures negative.  Patient was initiated on Zosyn on admit which was being continued.  Patient remains hemodynamically stable.  No leukocytosis and afebrile.  Zosyn discontinued by pulmonology 1/19.  Repeat chest x-ray with persistent effusion and therefore a CT thorax was obtained which showed worsening effusion.  Geovany negative for vegetation.  Discussed with pulmonology, concerns for malignant effusion.  Tumor markers, CT abdomen/pelvis ordered.  Pulmonology recommended consulting CT surgery for possible VATS    Today  Seen and anxious did not sleep much overnight.  Going for VATS today.        OBJECTIVE/PHYSICAL EXAM     VITAL SIGNS (MOST RECENT):  Temp: 97.7 °F (36.5 °C) (01/24/23 0753)  Pulse: 64 (01/24/23 0825)  Resp: 18 (01/24/23 0825)  BP: (!) 169/80 (01/24/23 0825)  SpO2: 97 % (01/24/23 0825)   VITAL SIGNS (24 HOUR RANGE):  Temp:  [97.7 °F (36.5  °C)-98.8 °F (37.1 °C)] 97.7 °F (36.5 °C)  Pulse:  [60-66] 64  Resp:  [15-20] 18  SpO2:  [94 %-97 %] 97 %  BP: (136-169)/(66-88) 169/80   GENERAL: In no acute distress, afebrile  HEENT:  CHEST:  Slightly diminished at the left lung base and crackle  HEART: S1, S2, no appreciable murmur  ABDOMEN: Soft, nontender, BS +  MSK: Warm, no lower extremity edema, no clubbing or cyanosis  NEUROLOGIC: Alert and oriented x4, moving all extremities with good strength   INTEGUMENTARY:  PSYCHIATRY:        ASSESSMENT/PLAN   Recurrent left-sided exudative pleural effusion  Left lower lobe community-acquired pneumonia-treated   Acute hypoxemic respiratory failure     History of:  Chronic migraine, chronic back pain status post stimulator, HTN, 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  Recommends VATS.    CT surgery following.  Planning for VATS today.  Completed antibiotic course for pneumonia.  Will follow-up on operative report    DVT prophylaxis:  Lovenox 40    Anticipated discharge and disposition:   __________________________________________________________________________    LABS/MICRO/MEDS/DIAGNOSTICS       LABS  Recent Labs     01/22/23  0637 01/24/23  0353   * 130*   K 4.3 3.9   CHLORIDE 100 98   CO2 28 26   BUN 5.9* 7.2*   CREATININE 0.62 0.53*   GLUCOSE 89 88   CALCIUM 8.7 8.3*   ALKPHOS 112  --    AST 20  --    ALT 13  --    ALBUMIN 2.4*  --        Recent Labs     01/24/23  0353   WBC 7.6   RBC 3.25*   HCT 28.9*   MCV 88.9            MICROBIOLOGY  Microbiology Results (last 7 days)       Procedure Component Value Units Date/Time    Blood Culture [[id number]]  (Normal) Collected: 01/18/23 0747    Order Status: Completed Specimen: Blood Updated: 01/23/23 1101     CULTURE, BLOOD ([facility]) No Growth at 5 days    Blood Culture [[id number]]  (Normal) Collected: 01/18/23 0747    Order Status: Completed Specimen: Blood Updated: 01/23/23 1101     CULTURE,  BLOOD ([facility]) No Growth at 5 days               MEDICATIONS   amLODIPine  10 mg Oral Daily    benzonatate  100 mg Oral TID    calcium-vitamin D3  1 tablet Oral Daily    cefUROXime (ZINACEF) IVPB  1.5 g Intravenous Once    cholestyramine  1 packet Oral BID    enoxaparin  40 mg Subcutaneous Daily    Lactobacillus acidophilus  1 capsule Oral Daily    metoprolol tartrate  25 mg Oral BID    multivitamin  1 tablet Oral Daily    umeclidinium-vilanteroL  1 puff Inhalation Daily    ursodioL  500 mg Oral BID    valsartan  160 mg Oral Daily         INFUSIONS         DIAGNOSTIC TESTS  US Pelvis Complete Non OB   Final Result      The bilateral ovaries appear trophic.  No acute sonographic abnormality appreciated.  No transvaginal imaging performed limiting evaluation.         Electronically signed by: Maury Mustafa   Date:    01/21/2023   Time:    12:13      CT Abdomen Pelvis  Without Contrast   Final Result      Left-sided pleural effusion and left lower lobe atelectasis.  Effusion is moderate to large in size.      Stable left renal cyst      Stable old fracture of T10 and L5         Electronically signed by: Sreekanth Nuno   Date:    01/20/2023   Time:    12:04      CT Chest Without Contrast   Final Result      Worsening left-sided pleural effusion      Left lower lobe atelectasis      COPD         Electronically signed by: Sreekanth Nuno   Date:    01/19/2023   Time:    17:17      X-Ray Chest PA And Lateral   Final Result      No significant change as compared with the previous exam.      Changes of left-sided pleural effusion         Electronically signed by: Joseluis Lewis   Date:    01/19/2023   Time:    08:55      X-Ray Chest Lateral Decubitus Left   Final Result      Layering of pleural effusion         Electronically signed by: Joseluis Lewis   Date:    01/19/2023   Time:    08:54      X-Ray Chest 1 View   Final Result      No significant change         Electronically signed by: Joseluis Lewis    Date:    01/17/2023   Time:    08:35      X-Ray Chest 1 View   Final Result      No change since previous         Electronically signed by: Sreekanth Nuno   Date:    01/16/2023   Time:    10:45      X-Ray Chest PA And Lateral   Final Result      Larger left pleural effusion, now moderate to large volume.         Electronically signed by: Aman Thomas   Date:    01/14/2023   Time:    10:32      US Non Rad Thoracentesis with Imaging, Aspiration Only   Final Result      CT Chest With Contrast   Final Result      1. Left lower lung lobe complete collapse consolidation.      2. Moderate volume left pleural effusion and trace of right pleural effusion      3. Chronic appearing compression deformity of T11 vertebral body.         Electronically signed by: Mikie Sanon   Date:    01/13/2023   Time:    22:34      X-Ray Chest 1 View   Final Result      Unchanged small left pleural effusion.         Electronically signed by: Rosa Butler   Date:    01/13/2023   Time:    11:48           EF   Date Value Ref Range Status   01/17/2023 60 % Final              Case related differential diagnoses have been reviewed; assessment and plan has been documented. I have personally reviewed the labs and test results that are currently available; I have reviewed the patients medication list. I have reviewed the consulting providers recommendations. I have reviewed or attempted to review medical records based upon their availability.  All of the patient's and/or family's questions have been addressed and answered to the best of my ability.  I will continue to monitor closely and make adjustments to medical management as needed.  This document was created using M*Modal Fluency Direct.  Transcription errors may have been made.  Please contact me if any questions may rise regarding documentation to clarify transcription.        Manpreet Sofia MD   01/24/2023   Internal Medicine

## 2023-01-24 NOTE — PT/OT/SLP PROGRESS
Physical Therapy  Treatment    Purnima Higgins   MRN: [id number]   Admitting Diagnosis: Pleural effusion on left    Pt off the floor for a VATS at this time. PT will follow up as schedule allows.

## 2023-01-24 NOTE — TRANSFER OF CARE
"Anesthesia Transfer of Care Note    Patient: Purnima Higgins    Procedure(s) Performed: Procedure(s) (LRB):  VATS (VIDEO-ASSISTED THORACOSCOPIC SURGERY) (Left)    Patient location: PACU    Anesthesia Type: general    Transport from OR: Transported from OR on room air with adequate spontaneous ventilation    Post pain: adequate analgesia    Post assessment: no apparent anesthetic complications    Post vital signs: stable    Level of consciousness: awake, alert and oriented    Nausea/Vomiting: no nausea/vomiting    Complications: none    Transfer of care protocol was followed      Last vitals:   Visit Vitals  BP (!) 169/80 (BP Location: Right arm, Patient Position: Sitting)   Pulse 64   Temp 36.5 °C (97.7 °F) (Oral)   Resp 18   Ht 4' 10" (1.473 m)   Wt 79.4 kg (175 lb 0.7 oz)   SpO2 97%   BMI 36.58 kg/m²     "

## 2023-01-24 NOTE — ANESTHESIA PROCEDURE NOTES
"Arterial Line    Date/Time: 2023 9:10 AM  Location procedure was performed: [facility] ANESTHESIOLOGY  Performed by: Ced Coreas MD  Authorized by: Gilma Griffith MD   Assisting provider: Gilma Griffith MD  Pre-op Diagnosis: VATS  Consent Done: Yes  Consent: Verbal consent obtained. Written consent obtained.  Risks and benefits: risks, benefits and alternatives were discussed  Consent given by: patient  Patient understanding: patient states understanding of the procedure being performed  Patient consent: the patient's understanding of the procedure matches consent given  Procedure consent: procedure consent matches procedure scheduled  Relevant documents: relevant documents present and verified  Test results: test results available and properly labeled  Site marked: the operative site was marked  Imaging studies: imaging studies available  Required items: required blood products, implants, devices, and special equipment available  Patient identity confirmed: , MRN, name, provided demographic data and verbally with patient  Time out: Immediately prior to procedure a "time out" was called to verify the correct patient, procedure, equipment, support staff and site/side marked as required.  Preparation: Patient was prepped and draped in the usual sterile fashion.  Indications: hemodynamic monitoring  Location: right radial  Anesthesia: local infiltration    Anesthesia:  Local Anesthetic: lidocaine 1% without epinephrine    Patient sedated: no  Needle gauge: 20  Seldinger technique: Seldinger technique used  Number of attempts: 1  Complications: No  Specimens: No  Implants: No  Post-procedure: dressing applied  Post-procedure CMS: normal  Patient tolerance: Patient tolerated the procedure well with no immediate complications        "

## 2023-01-24 NOTE — ANESTHESIA PREPROCEDURE EVALUATION
2023  Purnima Higgins is a 82 y.o., female who presented to the hospital with cough. She reported lower extremity edema and chest radiograph revealed left pleural effusion. She underwent thoracentesis on 23 removing 300 MLS of fluid. Echocardiogram on 23 revealed normal LV Function with Grade I Diastolic Dysfunction. There was s small mobile echogenic structure on the aortic valve with some calcification. [facility] is consulted for THEODORA to get better visualization of reported echogenic structure on surface echo.      Hospital Course:   23: NAD Noted. Underwent THEODORA. Tolerated well. She is stable.     PMH: Hypertension, Hyperlipidemia, Biliary Cirrhosis, History of DVT, Obesity, Back Pain with Implanted Nerve Stimulator  PSH: Back Stimulator Implant, Neck/Knee/Back/Hand Surgeries, , Cholecystectomy  Family History: Mother- Cancer  Social History: Tobacco- Former Smoker, Alcohol- Beer/Whiskey, Substance Abuse- Negative     Previous Cardiac Diagnostics:   THEODORA (23):  LV systolic function 60-65%.  No intracardiac  Vegetation.  Small immobile round echodensity seen attached to the non-coronary cusp of the aortic valve, related to AV calcification and not vegetation.  Mild thickening of the MV associated with Mild-moderate MR.  No intracardiac thrombus is present in this study.  Left Pleural effusion is present, this can be better assessed by dedicated Chest Imaging..      Recurrent Pleural effusion.    Pre-op Assessment    I have reviewed the Patient Summary Reports.     I have reviewed the Nursing Notes. I have reviewed the NPO Status.   I have reviewed the Medications.     Review of Systems  Anesthesia Hx:  No problems with previous Anesthesia    Social:  Non-Smoker    Cardiovascular:   Hypertension    Hepatic/GI:   Liver Disease,        Physical Exam  General: Well nourished,  Cooperative, Alert and Oriented    Airway:  Mallampati: II   Mouth Opening: Small, but > 3cm  TM Distance: Normal  Tongue: Normal  Neck ROM: Extension Decreased, Right Lateral Motion Decreased, Left Lateral Motion Decreased    Dental:  Intact        Anesthesia Plan  Type of Anesthesia, risks & benefits discussed:    Anesthesia Type: Gen ETT  Intra-op Monitoring Plan: Standard ASA Monitors  Post Op Pain Control Plan: multimodal analgesia  Induction:  IV  Airway Plan: Direct, Post-Induction  Informed Consent: Informed consent signed with the Patient and all parties understand the risks and agree with anesthesia plan.  All questions answered. Patient consented to blood products? Yes  ASA Score: 2  Day of Surgery Review of History & Physical: H&P Update referred to the surgeon/provider.    Ready For Surgery From Anesthesia Perspective.     .

## 2023-01-25 LAB
ALBUMIN SERPL-MCNC: 3 G/DL (ref 3.4–4.8)
ALBUMIN/GLOB SERPL: 0.8 RATIO (ref 1.1–2)
ALP SERPL-CCNC: 169 UNIT/L (ref 40–150)
ALT SERPL-CCNC: 16 UNIT/L (ref 0–55)
AST SERPL-CCNC: 30 UNIT/L (ref 5–34)
BASOPHILS # BLD AUTO: 0.03 X10(3)/MCL (ref 0–0.2)
BASOPHILS NFR BLD AUTO: 0.2 %
BILIRUBIN DIRECT+TOT PNL SERPL-MCNC: 0.5 MG/DL
BUN SERPL-MCNC: 18.5 MG/DL (ref 9.8–20.1)
CALCIUM SERPL-MCNC: 9 MG/DL (ref 8.4–10.2)
CHLORIDE SERPL-SCNC: 94 MMOL/L (ref 98–107)
CO2 SERPL-SCNC: 27 MMOL/L (ref 23–31)
CREAT SERPL-MCNC: 1.15 MG/DL (ref 0.55–1.02)
EOSINOPHIL # BLD AUTO: 0.05 X10(3)/MCL (ref 0–0.9)
EOSINOPHIL NFR BLD AUTO: 0.4 %
ERYTHROCYTE [DISTWIDTH] IN BLOOD BY AUTOMATED COUNT: 13.3 % (ref 11.5–17)
GFR SERPLBLD CREATININE-BSD FMLA CKD-EPI: 48 MLS/MIN/1.73/M2
GLOBULIN SER-MCNC: 3.9 GM/DL (ref 2.4–3.5)
GLUCOSE SERPL-MCNC: 101 MG/DL (ref 82–115)
HCT VFR BLD AUTO: 37 % (ref 37–47)
HGB BLD-MCNC: 12.2 GM/DL (ref 12–16)
IMM GRANULOCYTES # BLD AUTO: 0.1 X10(3)/MCL (ref 0–0.04)
IMM GRANULOCYTES NFR BLD AUTO: 0.8 %
LYMPHOCYTES # BLD AUTO: 1.6 X10(3)/MCL (ref 0.6–4.6)
LYMPHOCYTES NFR BLD AUTO: 12.6 %
M AVIUM PARATB TISS QL ZN STN: NORMAL
MCH RBC QN AUTO: 30.4 PG
MCHC RBC AUTO-ENTMCNC: 33 MG/DL (ref 33–36)
MCV RBC AUTO: 92.3 FL (ref 80–94)
MONOCYTES # BLD AUTO: 1.06 X10(3)/MCL (ref 0.1–1.3)
MONOCYTES NFR BLD AUTO: 8.3 %
NEUTROPHILS # BLD AUTO: 9.89 X10(3)/MCL (ref 2.1–9.2)
NEUTROPHILS NFR BLD AUTO: 77.7 %
NRBC BLD AUTO-RTO: 0 %
PLATELET # BLD AUTO: 366 X10(3)/MCL (ref 130–400)
PMV BLD AUTO: 10.3 FL (ref 7.4–10.4)
POTASSIUM SERPL-SCNC: 5.2 MMOL/L (ref 3.5–5.1)
PROT SERPL-MCNC: 6.9 GM/DL (ref 5.8–7.6)
RBC # BLD AUTO: 4.01 X10(6)/MCL (ref 4.2–5.4)
SODIUM SERPL-SCNC: 130 MMOL/L (ref 136–145)
WBC # SPEC AUTO: 12.7 X10(3)/MCL (ref 4.5–11.5)

## 2023-01-25 PROCEDURE — 99024 PR POST-OP FOLLOW-UP VISIT: ICD-10-PCS | Mod: POP,,, | Performed by: PHYSICIAN ASSISTANT

## 2023-01-25 PROCEDURE — 25000003 PHARM REV CODE 250: Performed by: PHYSICIAN ASSISTANT

## 2023-01-25 PROCEDURE — 97110 THERAPEUTIC EXERCISES: CPT | Mod: CQ

## 2023-01-25 PROCEDURE — 85025 COMPLETE CBC W/AUTO DIFF WBC: CPT | Performed by: INTERNAL MEDICINE

## 2023-01-25 PROCEDURE — 97530 THERAPEUTIC ACTIVITIES: CPT | Mod: CO

## 2023-01-25 PROCEDURE — 21400001 HC TELEMETRY ROOM

## 2023-01-25 PROCEDURE — 27000221 HC OXYGEN, UP TO 24 HOURS

## 2023-01-25 PROCEDURE — 25000242 PHARM REV CODE 250 ALT 637 W/ HCPCS: Performed by: INTERNAL MEDICINE

## 2023-01-25 PROCEDURE — 25000003 PHARM REV CODE 250: Performed by: INTERNAL MEDICINE

## 2023-01-25 PROCEDURE — 63600175 PHARM REV CODE 636 W HCPCS: Performed by: INTERNAL MEDICINE

## 2023-01-25 PROCEDURE — 80053 COMPREHEN METABOLIC PANEL: CPT | Performed by: INTERNAL MEDICINE

## 2023-01-25 PROCEDURE — 97116 GAIT TRAINING THERAPY: CPT | Mod: CQ

## 2023-01-25 PROCEDURE — 11000001 HC ACUTE MED/SURG PRIVATE ROOM

## 2023-01-25 PROCEDURE — 99024 POSTOP FOLLOW-UP VISIT: CPT | Mod: POP,,, | Performed by: PHYSICIAN ASSISTANT

## 2023-01-25 PROCEDURE — 36415 COLL VENOUS BLD VENIPUNCTURE: CPT | Performed by: INTERNAL MEDICINE

## 2023-01-25 RX ADMIN — THERA TABS 1 TABLET: TAB at 09:01

## 2023-01-25 RX ADMIN — CHOLESTYRAMINE 4 G: 4 POWDER, FOR SUSPENSION ORAL at 09:01

## 2023-01-25 RX ADMIN — MUPIROCIN 1 G: 20 OINTMENT TOPICAL at 09:01

## 2023-01-25 RX ADMIN — AMLODIPINE BESYLATE 10 MG: 5 TABLET ORAL at 09:01

## 2023-01-25 RX ADMIN — UMECLIDINIUM BROMIDE AND VILANTEROL TRIFENATATE 1 PUFF: 62.5; 25 POWDER RESPIRATORY (INHALATION) at 09:01

## 2023-01-25 RX ADMIN — HYDROCODONE BITARTRATE AND ACETAMINOPHEN 1 TABLET: 5; 325 TABLET ORAL at 09:01

## 2023-01-25 RX ADMIN — VALSARTAN 160 MG: 80 TABLET, FILM COATED ORAL at 09:01

## 2023-01-25 RX ADMIN — HYDROCODONE BITARTRATE AND ACETAMINOPHEN 1 TABLET: 5; 325 TABLET ORAL at 01:01

## 2023-01-25 RX ADMIN — HYDROCODONE BITARTRATE AND ACETAMINOPHEN 1 TABLET: 5; 325 TABLET ORAL at 05:01

## 2023-01-25 RX ADMIN — URSODIOL 500 MG: 250 TABLET ORAL at 09:01

## 2023-01-25 RX ADMIN — ENOXAPARIN SODIUM 40 MG: 40 INJECTION SUBCUTANEOUS at 05:01

## 2023-01-25 RX ADMIN — BENZONATATE 100 MG: 100 CAPSULE ORAL at 03:01

## 2023-01-25 RX ADMIN — METOPROLOL TARTRATE 25 MG: 25 TABLET, FILM COATED ORAL at 09:01

## 2023-01-25 RX ADMIN — TRAMADOL HYDROCHLORIDE 50 MG: 50 TABLET, COATED ORAL at 03:01

## 2023-01-25 RX ADMIN — Medication 1 TABLET: at 09:01

## 2023-01-25 RX ADMIN — Medication 1 CAPSULE: at 09:01

## 2023-01-25 RX ADMIN — BENZONATATE 100 MG: 100 CAPSULE ORAL at 09:01

## 2023-01-25 NOTE — PT/OT/SLP PROGRESS
Occupational Therapy  Treatment    Purnima Higgins   MRN: [id number]   Admitting Diagnosis: Pleural effusion on left    OT Date of Treatment: 01/25/23   OT Start Time: 1147  OT Stop Time: 1200  OT Total Time (min): 13 min      OT/BOB: BOB ROJAS Visit Number: 5    General Precautions: Standard, fall  Orthopedic Precautions:    Braces:      Spiritual, Cultural Beliefs, Muslim Practices, Values that Affect Care: no    Subjective:  Communicated with RN prior to session.         Objective:       Functional Mobility:  Bed Mobility:   Sit to supine: Standby Assistance    Transfer Training:   Sit to stand:Minimal Assistance with Rolling Walker from chair  Bed <> Chair:  Step Transfer with Stand-by Assistance with Rolling Walker from chair to EOB    Patient left HOB elevated with all lines intact and call button in reach    ASSESSMENT:  Purnima Higgins is a 82 y.o. female with a medical diagnosis of Pleural effusion on left.    Rehab potential is excellent    Activity tolerance: Excellent    Discharge recommendations: home with home health     Equipment recommendations:       GOALS:   Multidisciplinary Problems       Occupational Therapy Goals          Problem: Occupational Therapy    Goal Priority Disciplines Outcome Interventions   Occupational Therapy Goal     OT, PT/OT Ongoing, Progressing    Description: Goals to be met by: 1/30     Patient will increase functional independence with ADLs by performing:    LE Dressing with Modified [place].  Grooming while standing at sink with Modified [place].  Toileting from toilet with Modified [place] for hygiene and clothing management.   Bathing from  shower chair/bench with Modified [place].  Toilet transfer to toilet with Modified [place].                         Plan:  Patient to be seen 3 x/week, 5 x/week to address the above listed problems via self-care/home management, therapeutic activities, therapeutic exercises  Plan of Care expires:  01/30/23  Plan of Care reviewed with: patient         01/25/2023

## 2023-01-25 NOTE — PT/OT/SLP PROGRESS
Physical Therapy  Treatment    Purnima Higgins   MRN: [id number]   Admitting Diagnosis: Pleural effusion on left       PT Start Time: 1000     PT Stop Time: 1025    PT Total Time (min): 25 min       Billable Minutes:  Gait Training 13 and Therapeutic Exercise 12    Treatment Type: Treatment  PT/PTA: PTA     PTA Visit Number: 5       General Precautions: Standard, fall  Orthopedic Precautions:    Braces:    Respiratory Status: Room air    Spiritual, Cultural Beliefs, Baptist Practices, Values that Affect Care: no    Subjective:  Communicated with NSG prior to session.         Objective:        Functional Mobility:  Bed Mobility:    Supine to/from sit. [place].    Transfers:  Sit to/from stand. Min A. RW.  Spinal stimulator battery pack donned in standing.    Gait:    Pt ambulated ~160ft. Slow but steady step through gait pattern. Decreased step length and evangelist. No unsteadiness or LOB. RW. Oniel.        AM-PAC 6 CLICK MOBILITY  How much help from another person does this patient currently need?   1 = Unable, Total/Dependent Assistance  2 = A lot, Maximum/Moderate Assistance  3 = A little, Minimum/Contact Guard/Supervision  4 = None, Modified [place]/Independent         AM-PAC Raw Score CMS G-Code Modifier Level of Impairment Assistance   6 % Total / Unable   7 - 9 CM 80 - 100% Maximal Assist   10 - 14 CL 60 - 80% Moderate Assist   15 - 19 CK 40 - 60% Moderate Assist   20 - 22 CJ 20 - 40% Minimal Assist   23 CI 1-20% SBA / CGA   24 CH 0% Independent/ Mod I     Patient left up in chair with all lines intact and call button in reach.    Assessment:  Purnima Higgins is a 82 y.o. female with a medical diagnosis of Pleural effusion on left  Rehab identified problem list/impairments: weakness    Rehab potential is good.    Activity tolerance: Good    Discharge recommendations: home, home with home health, home health PT      Barriers to discharge:      Equipment recommendations: none     GOALS:    Multidisciplinary Problems       Physical Therapy Goals          Problem: Physical Therapy    Goal Priority Disciplines Outcome Goal Variances Interventions   Physical Therapy Goal     PT, PT/OT Ongoing, Progressing     Description: Goals to be met by: 2023     Patient will increase functional independence with mobility by performin. Supine to sit with [place]  2. Sit to stand transfer with Modified [place]  3. Gait  x 200 feet with Modified [place] using Rolling Walker.                          PLAN:    Patient to be seen 6 x/week to address the above listed problems via gait training, therapeutic activities, therapeutic exercises  Plan of Care expires:    Plan of Care reviewed with: patient         2023

## 2023-01-25 NOTE — PROGRESS NOTES
CT SURGERY PROGRESS NOTE  Purnima Higgins  82 y.o.  1940    Patients Procedure: Procedure(s) (LRB):  VATS (VIDEO-ASSISTED THORACOSCOPIC SURGERY) (Left)  PLEURODESIS, USING TALC (Left)    Subjective  Interval History: POD1    ROS    Medication List  Infusions    Scheduled   amLODIPine  10 mg Oral Daily    benzonatate  100 mg Oral TID    calcium-vitamin D3  1 tablet Oral Daily    cholestyramine  1 packet Oral BID    enoxaparin  40 mg Subcutaneous Daily    Lactobacillus acidophilus  1 capsule Oral Daily    meperidine  12.5 mg Intravenous Once    metoprolol tartrate  25 mg Oral BID    multivitamin  1 tablet Oral Daily    mupirocin  1 g Nasal BID    ondansetron  4 mg Intravenous Once    umeclidinium-vilanteroL  1 puff Inhalation Daily    ursodioL  500 mg Oral BID    valsartan  160 mg Oral Daily       Objective:  Recent Vitals:  Temp:  [97.4 °F (36.3 °C)-98.4 °F (36.9 °C)] 98.4 °F (36.9 °C)  Pulse:  [53-72] 72  Resp:  [16-28] 18  SpO2:  [91 %-97 %] 95 %  BP: (110-158)/(63-94) 120/73    Physical Exam     I/O last 24 hrs:  Intake/Output - Last 3 Shifts         01/23 0700 01/24 0659 01/24 0700 01/25 0659 01/25 0700 01/26 0659    P.O. 760 0     IV Piggyback  600     Total Intake(mL/kg) 760 (9.6) 600 (8)     Urine (mL/kg/hr) 2050 (1.1) 700 (0.4)     Stool 0 0     Chest Tube  200     Total Output 2050 900     Net -1290 -300            Urine Occurrence  0 x     Stool Occurrence 1 x 0 x             Labs  BMP:   Recent Labs   Lab 01/25/23  0545   *   K 5.2*   CO2 27   BUN 18.5   CREATININE 1.15*   CALCIUM 9.0     CBC:   Recent Labs   Lab 01/25/23  0545   WBC 12.7*   RBC 4.01*   HGB 12.2   HCT 37.0      MCV 92.3   MCH 30.4   MCHC 33.0     CMP:   Recent Labs   Lab 01/25/23  0545   CALCIUM 9.0   ALBUMIN 3.0*   *   K 5.2*   CO2 27   BUN 18.5   CREATININE 1.15*   ALKPHOS 169*   ALT 16   AST 30   BILITOT 0.5         Imaging:   CXR: X-Ray Chest 1 View    Result Date: 1/25/2023  As above. Electronically signed  by: Maury Mustafa Date:    01/25/2023 Time:    06:48    X-Ray Chest 1 View    Result Date: 1/24/2023  Interval insertion of left-sided chest tube with no clear evidence of pneumothorax. Increase interstitial markings throughout both lung fields. Increased left retrocardiac density and silhouetting of the left hemidiaphragm which might be related to an infiltrate/atelectasis Electronically signed by: Joseluis Lewis Date:    01/24/2023 Time:    13:01         ASSESSMENT/PLAN:    Ct to water seal tomorrow   mobilze    Case and plan of care discussed with MD Julien Anthony PA-C

## 2023-01-25 NOTE — PROGRESS NOTES
[facility] 5[facility] Med Surg  Pulmonary/Critical Care - Medicine  Progress Note    Patient Name: Purnima Higgins  MRN: [id number]  Admission Date: 1/13/2023  Hospital Length of Stay: 12 days  Code Status: Full Code  Attending Provider: Ching Monzon MD  Primary Care Provider: Jos Briseno MD     Subjective:     HPI:   This is an 82-year-old female with a history of HTN, HLD, back pain with implanted stimulator who has been experiencing a cough x 3 weeks. She failed outpatient treatment by PCP having received doxycycline, prednisone and Levaquin. She was then referred to the ED for further evaluation on 1/13/23. She also reported new onset swelling in her lower extremities data to presentation. CXR in ER revealed a small left pleural effusion, unchanged from previous imaging. CT of chest redemonstrated left pleural effusion. She was admitted to hospital medicine and pulmonary was consulted for evaluation of pleural effusion.  She underwent left thoracentesis on 01/16/23, removing ~300cc of thin, serosanguinous fluid, cytology negative.  Transthoracic echocardiogram 01/17/2023 with normal LVSF, EF 60%, grade 1 diastolic dysfunction.  There is a small mobile echogenic structure on the aortic valve with some calcification however THEODORA 1/19 with no evidence of mobile valvular structure.       Interval History/Significant Events:   Underwent a VATS with talc pleurodesis by Dr. Mott yesterday, 1/24/23. 350mL of tea-colored fluid was removed and placement of chest tube. Currently on 3 L nasal cannula with oxygen saturation of 97%. Chest tube out put 200 mL, serosanguinous. There is fluctuation with inspiration but no air leak was observed. Patient reports some pain with inspiration.      Scheduled Medications:   amLODIPine  10 mg Oral Daily    benzonatate  100 mg Oral TID    calcium-vitamin D3  1 tablet Oral Daily    cholestyramine  1 packet Oral BID    enoxaparin  40 mg Subcutaneous Daily     Lactobacillus acidophilus  1 capsule Oral Daily    meperidine  12.5 mg Intravenous Once    metoprolol tartrate  25 mg Oral BID    multivitamin  1 tablet Oral Daily    mupirocin  1 g Nasal BID    ondansetron  4 mg Intravenous Once    umeclidinium-vilanteroL  1 puff Inhalation Daily    ursodioL  500 mg Oral BID    valsartan  160 mg Oral Daily     PRN Medications:  albuterol-ipratropium, albuterol-ipratropium, aluminum-magnesium hydroxide-simethicone, amitriptyline, dextromethorphan-guaiFENesin  mg/5 ml, diphenhydrAMINE, HYDROcodone-acetaminophen, HYDROmorphone, HYDROmorphone, melatonin, metoclopramide HCl, miconazole NITRATE 2 %, ondansetron, ondansetron, polyethylene glycol, prochlorperazine, senna-docusate 8.6-50 mg, simethicone, sodium chloride 0.9%, traMADoL        Past Medical History:   Diagnosis Date    Arthritis     Back pain     scs implant- Dr ritter    Dyslipidemia     Hypertension     Liver disease     Spinal cord disorder        Past Surgical History:   Procedure Laterality Date    APPENDECTOMY      BACK SURGERY       SECTION      CHOLECYSTECTOMY      LAPAROSCOPIC REPAIR OF INCISIONAL HERNIA N/A 6/15/2022    Procedure: REPAIR, HERNIA, INCISIONAL, LAPAROSCOPIC;  Surgeon: Nayan Gonzalez MD;  [facility]: [facility];  Service: General;  Laterality: N/A;    NECK SURGERY      SHOULDER SURGERY Bilateral     TOTAL KNEE ARTHROPLASTY         14 point ROS reviewed and negative unless documented in the history of present illness.       Objective:       Physical Exam:  Gen- A/O, NAD  CV- RRR  Resp- scattered ronchi bilaterally, otherwise CTA; normal work of breathing   MSK- WWP, no LE edema  Neuro- A/Ox3, BYRON, no gross deficits  Psych- appropriate mood and affect       Input/output:    Intake/Output Summary (Last 24 hours) at 2023 0810  Last data filed at 2023 0500  Gross per 24 hour   Intake 600 ml   Output 900 ml   Net -300 ml       Vital Signs (Most Recent):  Temp: 98.4 °F (36.9 °C) (23  0733)  Pulse: 72 (01/25/23 0733)  Resp: 18 (01/25/23 0733)  BP: 120/73 (01/25/23 0733)  SpO2: 95 % (01/25/23 0733)    Body mass index is 34.74 kg/m².  Weight: 75.4 kg (166 lb 3.6 oz) Vital Signs (24h Range):  Temp:  [97.4 °F (36.3 °C)-98.4 °F (36.9 °C)] 98.4 °F (36.9 °C)  Pulse:  [53-72] 72  Resp:  [16-28] 18  SpO2:  [91 %-97 %] 95 %  BP: (110-169)/(63-94) 120/73         Lines/Drains/Airways       Drain  Duration                  Chest Tube Tube - 1 Left -- days              Peripheral Intravenous Line  Duration                  Peripheral IV - Single Lumen 01/24/23 1001 20 G Left Forearm <1 day         Peripheral IV - Single Lumen 01/24/23 1009 20 G Right Wrist <1 day                  Significant Labs:    Lab Results   Component Value Date    WBC 12.7 (H) 01/25/2023    HGB 12.2 01/25/2023    HCT 37.0 01/25/2023    MCV 92.3 01/25/2023     01/25/2023           Assessment/Plan:     Recurrent left pleural effusion  Fluid studies 01/16/2023 with transudative chemistry and negative cytology   Reported history primary biliary cirrhosis  Obesity, BMI 36.6      Plan:  - pleural fluid cytology negative with effusion reaccumulation noted   - Postoperative management per CV surgery along with chest tube management  - will continue to follow pathology and culture results postoperatively  - Continue supplemental oxygen, wean as tolerated. Turned down to 2L and oxygen saturations maintained in the mid 90s.  - encouraged IS and mobilization           Luanne Purvis, DOUGIE  Pulmonary/Critical Care  [facility] - 5th Floor Med Surg

## 2023-01-26 LAB
ANION GAP SERPL CALC-SCNC: 7 MEQ/L
BASOPHILS # BLD AUTO: 0.04 X10(3)/MCL (ref 0–0.2)
BASOPHILS NFR BLD AUTO: 0.4 %
BUN SERPL-MCNC: 17.3 MG/DL (ref 9.8–20.1)
CALCIUM SERPL-MCNC: 9.1 MG/DL (ref 8.4–10.2)
CHLORIDE SERPL-SCNC: 91 MMOL/L (ref 98–107)
CO2 SERPL-SCNC: 25 MMOL/L (ref 23–31)
CREAT SERPL-MCNC: 0.72 MG/DL (ref 0.55–1.02)
CREAT/UREA NIT SERPL: 24
EOSINOPHIL # BLD AUTO: 0.59 X10(3)/MCL (ref 0–0.9)
EOSINOPHIL NFR BLD AUTO: 5.2 %
ERYTHROCYTE [DISTWIDTH] IN BLOOD BY AUTOMATED COUNT: 13.2 % (ref 11.5–17)
GFR SERPLBLD CREATININE-BSD FMLA CKD-EPI: >60 MLS/MIN/1.73/M2
GLUCOSE SERPL-MCNC: 118 MG/DL (ref 82–115)
HCT VFR BLD AUTO: 32.3 % (ref 37–47)
HGB BLD-MCNC: 11.1 GM/DL (ref 12–16)
IMM GRANULOCYTES # BLD AUTO: 0.09 X10(3)/MCL (ref 0–0.04)
IMM GRANULOCYTES NFR BLD AUTO: 0.8 %
LYMPHOCYTES # BLD AUTO: 1.16 X10(3)/MCL (ref 0.6–4.6)
LYMPHOCYTES NFR BLD AUTO: 10.2 %
MCH RBC QN AUTO: 30.7 PG
MCHC RBC AUTO-ENTMCNC: 34.4 MG/DL (ref 33–36)
MCV RBC AUTO: 89.2 FL (ref 80–94)
MONOCYTES # BLD AUTO: 1.17 X10(3)/MCL (ref 0.1–1.3)
MONOCYTES NFR BLD AUTO: 10.3 %
NEUTROPHILS # BLD AUTO: 8.3 X10(3)/MCL (ref 2.1–9.2)
NEUTROPHILS NFR BLD AUTO: 73.1 %
NRBC BLD AUTO-RTO: 0 %
PLATELET # BLD AUTO: 273 X10(3)/MCL (ref 130–400)
PMV BLD AUTO: 10.2 FL (ref 7.4–10.4)
POTASSIUM SERPL-SCNC: 4.2 MMOL/L (ref 3.5–5.1)
PSYCHE PATHOLOGY RESULT: NORMAL
RBC # BLD AUTO: 3.62 X10(6)/MCL (ref 4.2–5.4)
SODIUM SERPL-SCNC: 123 MMOL/L (ref 136–145)
WBC # SPEC AUTO: 11.4 X10(3)/MCL (ref 4.5–11.5)

## 2023-01-26 PROCEDURE — 80048 BASIC METABOLIC PNL TOTAL CA: CPT | Performed by: INTERNAL MEDICINE

## 2023-01-26 PROCEDURE — 25000242 PHARM REV CODE 250 ALT 637 W/ HCPCS: Performed by: INTERNAL MEDICINE

## 2023-01-26 PROCEDURE — 27000221 HC OXYGEN, UP TO 24 HOURS

## 2023-01-26 PROCEDURE — 97168 OT RE-EVAL EST PLAN CARE: CPT

## 2023-01-26 PROCEDURE — 63600175 PHARM REV CODE 636 W HCPCS: Performed by: INTERNAL MEDICINE

## 2023-01-26 PROCEDURE — 99024 PR POST-OP FOLLOW-UP VISIT: ICD-10-PCS | Mod: POP,,, | Performed by: PHYSICIAN ASSISTANT

## 2023-01-26 PROCEDURE — 11000001 HC ACUTE MED/SURG PRIVATE ROOM

## 2023-01-26 PROCEDURE — 99024 POSTOP FOLLOW-UP VISIT: CPT | Mod: POP,,, | Performed by: PHYSICIAN ASSISTANT

## 2023-01-26 PROCEDURE — 85025 COMPLETE CBC W/AUTO DIFF WBC: CPT | Performed by: INTERNAL MEDICINE

## 2023-01-26 PROCEDURE — 25000003 PHARM REV CODE 250: Performed by: PHYSICIAN ASSISTANT

## 2023-01-26 PROCEDURE — 94761 N-INVAS EAR/PLS OXIMETRY MLT: CPT

## 2023-01-26 PROCEDURE — 25000003 PHARM REV CODE 250: Performed by: INTERNAL MEDICINE

## 2023-01-26 PROCEDURE — 21400001 HC TELEMETRY ROOM

## 2023-01-26 PROCEDURE — 97164 PT RE-EVAL EST PLAN CARE: CPT

## 2023-01-26 RX ORDER — SODIUM CHLORIDE 9 MG/ML
INJECTION, SOLUTION INTRAVENOUS CONTINUOUS
Status: ACTIVE | OUTPATIENT
Start: 2023-01-26 | End: 2023-01-26

## 2023-01-26 RX ADMIN — BENZONATATE 100 MG: 100 CAPSULE ORAL at 08:01

## 2023-01-26 RX ADMIN — METOPROLOL TARTRATE 25 MG: 25 TABLET, FILM COATED ORAL at 07:01

## 2023-01-26 RX ADMIN — Medication 1 TABLET: at 08:01

## 2023-01-26 RX ADMIN — TRAMADOL HYDROCHLORIDE 50 MG: 50 TABLET, COATED ORAL at 11:01

## 2023-01-26 RX ADMIN — CHOLESTYRAMINE 4 G: 4 POWDER, FOR SUSPENSION ORAL at 08:01

## 2023-01-26 RX ADMIN — UMECLIDINIUM BROMIDE AND VILANTEROL TRIFENATATE 1 PUFF: 62.5; 25 POWDER RESPIRATORY (INHALATION) at 04:01

## 2023-01-26 RX ADMIN — THERA TABS 1 TABLET: TAB at 08:01

## 2023-01-26 RX ADMIN — MUPIROCIN 1 G: 20 OINTMENT TOPICAL at 07:01

## 2023-01-26 RX ADMIN — MUPIROCIN 1 G: 20 OINTMENT TOPICAL at 08:01

## 2023-01-26 RX ADMIN — BENZONATATE 100 MG: 100 CAPSULE ORAL at 07:01

## 2023-01-26 RX ADMIN — METOPROLOL TARTRATE 25 MG: 25 TABLET, FILM COATED ORAL at 08:01

## 2023-01-26 RX ADMIN — URSODIOL 500 MG: 250 TABLET ORAL at 07:01

## 2023-01-26 RX ADMIN — TRAMADOL HYDROCHLORIDE 50 MG: 50 TABLET, COATED ORAL at 10:01

## 2023-01-26 RX ADMIN — URSODIOL 500 MG: 250 TABLET ORAL at 08:01

## 2023-01-26 RX ADMIN — Medication 1 CAPSULE: at 08:01

## 2023-01-26 RX ADMIN — SODIUM CHLORIDE: 9 INJECTION, SOLUTION INTRAVENOUS at 11:01

## 2023-01-26 RX ADMIN — HYDROCODONE BITARTRATE AND ACETAMINOPHEN 1 TABLET: 5; 325 TABLET ORAL at 01:01

## 2023-01-26 RX ADMIN — AMLODIPINE BESYLATE 10 MG: 5 TABLET ORAL at 08:01

## 2023-01-26 RX ADMIN — Medication: at 07:01

## 2023-01-26 RX ADMIN — TRAMADOL HYDROCHLORIDE 50 MG: 50 TABLET, COATED ORAL at 04:01

## 2023-01-26 RX ADMIN — BENZONATATE 100 MG: 100 CAPSULE ORAL at 03:01

## 2023-01-26 RX ADMIN — VALSARTAN 160 MG: 80 TABLET, FILM COATED ORAL at 08:01

## 2023-01-26 RX ADMIN — HYDROCODONE BITARTRATE AND ACETAMINOPHEN 1 TABLET: 5; 325 TABLET ORAL at 07:01

## 2023-01-26 RX ADMIN — CHOLESTYRAMINE 4 G: 4 POWDER, FOR SUSPENSION ORAL at 07:01

## 2023-01-26 RX ADMIN — ENOXAPARIN SODIUM 40 MG: 40 INJECTION SUBCUTANEOUS at 04:01

## 2023-01-26 NOTE — PT/OT/SLP RE-EVAL
Occupational Therapy   Re-evaluation    Name: Purnima Higgins  MRN: [id number]  Admitting Diagnosis:  Pleural effusion on left  Recent Surgery: Procedure(s) (LRB):  VATS (VIDEO-ASSISTED THORACOSCOPIC SURGERY) (Left)  PLEURODESIS, USING TALC (Left) 2 Days Post-Op    Recommendations:     Discharge Recommendations: home with home health and assist provided from   Discharge Equipment Recommendations: walker, rolling, dressing AEDs, possibly oxygen (TBD, pending progress)  Barriers to discharge: medical dx    Assessment:     Purnima Higgins is a 82 y.o. female with a medical diagnosis of Pleural effusion on left. She presents with c/o slight pain at CT site. Performance deficits affecting function are weakness, impaired endurance, impaired self care skills, impaired functional mobility, gait instability, impaired balance, decreased safety awareness, pain, impaired cardiopulmonary response to activity.      Rehab Prognosis: Good; patient would benefit from acute skilled OT services to address these deficits and reach maximum level of function.       Plan:     Patient to be seen 5 x/week, 6 x/week to address the above listed problems via self-care/home management, therapeutic activities, therapeutic exercises  Plan of Care Expires: 02/09/23  Plan of Care Reviewed with: patient    Subjective     Chief Complaint: Pt c/o slight pain at CT site.   Patient/Family stated goals: Return home to [facility].  Communicated with: RN prior to session.    Objective:     Communicated with: RN prior to session. Patient found up in chair with: telemetry, pulse ox (continuous), oxygen, chest tube upon OT entry to room.    General Precautions: Standard, fall  Orthopedic Precautions: N/A  Braces: N/A  Respiratory Status: Nasal cannula, flow 1.5 L/min  Vitals:  O2:   While receiving O2 via NC: 92-96%   While receiving O2 via RA (cleared by RN): O2 desat to 88%. Pt was placed back on O2 via NC, and pt's O2 quickly recovered to WNL. RN  notified.    Occupational Performance:    Functional Mobility/Transfers:  Patient completed multiple Sit <> Stand Transfers from chair with minimum assistance provided, using rolling walker   Functional Mobility: Pt ambulated <> sink in bathroom with CGA progressing to SBA provided, using RW.    Activities of Daily Living:  Grooming: Pt combed hair and performed oral hygiene task with SBA provided while standing at sink using RW.  Lower Body Dressing: Pt doffed/donned B socks with mod A provided, requiring assist to don B socks. Pt would benefit from sock-aid, and OT will provide education tomorrow. Pt donned pamper with mod A provided, requiring assist to don LB clothing item over buttocks.    Cognitive/Visual Perceptual:  Cognitive/Psychosocial Skills:     -       Oriented to: Person, Place, Time, and Situation   -       Follows Commands/attention:Follows one-step commands and Follows two-step commands    Physical Exam:  Upper Extremity Range of Motion:     -       BUEs: WFL except mod deficits noted in AROM of B shoulder flex (with increased deficits noted in L shoulder flex 2/2 previous L shoulder injury per pt)  Upper Extremity Strength:    -       BUEs: Grossly 4/5 except B shoulder flex=3-/5    Patient left up in chair with all lines intact and call button in reach.    GOALS:   Multidisciplinary Problems       Occupational Therapy Goals          Problem: Occupational Therapy    Goal Priority Disciplines Outcome Interventions   Occupational Therapy Goal     OT, PT/OT Ongoing, Progressing    Description: Goals to be met by: 2/9/23    Patient will increase functional independence with ADLs by performing:    LE Dressing with Modified [place], utilizing dressing AEDs if needed.  Grooming while standing at sink with Modified [place].  Toileting from toilet with Modified [place] for hygiene and clothing management.   Bathing from shower chair/bench with Modified [place].  Toilet transfer to  toilet with Modified [place].                         History:     Past Medical History:   Diagnosis Date    Arthritis     Back pain     scs implant- Dr ritter    Dyslipidemia     Hypertension     Liver disease     Spinal cord disorder          Past Surgical History:   Procedure Laterality Date    APPENDECTOMY      BACK SURGERY       SECTION      CHOLECYSTECTOMY      LAPAROSCOPIC REPAIR OF INCISIONAL HERNIA N/A 6/15/2022    Procedure: REPAIR, HERNIA, INCISIONAL, LAPAROSCOPIC;  Surgeon: Nayan Gonzalez MD;  Location: [facility] OR;  Service: General;  Laterality: N/A;    NECK SURGERY      PLEURODESIS USING TALC Left 2023    Procedure: PLEURODESIS, USING TALC;  Surgeon: Augie Mott MD;  Location: [facility];  Service: Cardiothoracic;  Laterality: Left;    SHOULDER SURGERY Bilateral     TOTAL KNEE ARTHROPLASTY      VIDEO-ASSISTED THORACOSCOPIC SURGERY (VATS) Left 2023    Procedure: VATS (VIDEO-ASSISTED THORACOSCOPIC SURGERY);  Surgeon: Augie Mott MD;  Location: [facility];  Service: Cardiothoracic;  Laterality: Left;  LEFT       Time Tracking:     OT Date of Treatment: 23  OT Start Time: 1118  OT Stop Time: 1148  OT Total Time (min): 30 min    Billable Minutes: Re-eval 30 mins    2023

## 2023-01-26 NOTE — PROGRESS NOTES
[facility]  Hospital Medicine Progress Note        Chief Complaint: Inpatient Follow-up for Left pleural effusion     HPI:   82-year-old female with significant history of HTN, HLD, chronic back pain status post spinal stimulator placement presented to the ED with worsening cough x3 weeks.  Patient was prescribed Levaquin, doxycycline, prednisone by PCP with no symptomatic improvement.  Also noted bilateral lower extremity swelling.  Given persistent complaints she decided to come to the ED.  Chest x-ray revealed a small left pleural effusion paid CT chest confirmed this.  Patient was admitted hospitalist medicine service.  Pulmonary was consulted for evaluation of pleural effusion.  Patient underwent left-sided thoracentesis on 01/16.  Echocardiogram on 01/17 with normal ejection fraction, grade 1 diastolic dysfunction.  Concern for small mobile echogenic structure on the aortic valve which needed further evaluation.  Cardiology consulted for GEOVANY.  Left-sided pleural effusion consistent with exudative effusion.  Cytology negative for malignancy and cultures negative.  Patient was initiated on Zosyn on admit which was being continued.  Patient remains hemodynamically stable.  No leukocytosis and afebrile.  Zosyn discontinued by pulmonology 1/19.  Repeat chest x-ray with persistent effusion and therefore a CT thorax was obtained which showed worsening effusion.  Geovany negative for vegetation.  Discussed with pulmonology, concerns for malignant effusion.  Tumor markers, CT abdomen/pelvis ordered.  Pulmonology recommended consulting CT surgery for possible VATS. This was done on 1/24/23.   Interval Hx:   Patient awake and comfortable. Has chest pain from chest tube site but controlled with po prn pain meds. Has been afebrile. Denies any SOB. + Cough.    Objective/physical exam:  General: In no acute distress  Chest: Clear to auscultation bilaterally  Heart: RRR, +S1, S2, no appreciable  murmur  Abdomen: Soft, nontender, BS +  MSK: Warm, no lower extremity edema, no clubbing or cyanosis  Neurologic: Alert and oriented x4, Cranial nerve II-XII intact, Strength 5/5 in all 4 extremities    VITAL SIGNS: 24 HRS MIN & MAX LAST   Temp  Min: 98.2 °F (36.8 °C)  Max: 98.4 °F (36.9 °C) 98.2 °F (36.8 °C)   BP  Min: 120/73  Max: 152/79 137/76   Pulse  Min: 61  Max: 87  65   Resp  Min: 16  Max: 28 16   SpO2  Min: 94 %  Max: 97 % 95 %       Recent Labs   Lab 01/22/23  0637 01/24/23  0353 01/25/23  0545   WBC 6.9 7.6 12.7*   RBC 3.42* 3.25* 4.01*   HGB 10.4* 9.9* 12.2   HCT 30.8* 28.9* 37.0   MCV 90.1 88.9 92.3   MCH 30.4 30.5 30.4   MCHC 33.8 34.3 33.0   RDW 13.2 13.2 13.3    253 366   MPV 10.4 10.3 10.3       Recent Labs   Lab 01/20/23  0329 01/21/23  0639 01/22/23  0637 01/24/23  0353 01/25/23  0545   * 130* 133* 130* 130*   K 3.8 4.3 4.3 3.9 5.2*   CO2 27 26 28 26 27   BUN 5.3* 7.2* 5.9* 7.2* 18.5   CREATININE 0.55 0.60 0.62 0.53* 1.15*   CALCIUM 8.2* 8.5 8.7 8.3* 9.0   MG 1.80 1.90 1.90  --   --    ALBUMIN 2.4* 2.4* 2.4*  --  3.0*   ALKPHOS 101 101 112  --  169*   ALT 15 15 13  --  16   AST 24 22 20  --  30   BILITOT 0.5 0.4 0.4  --  0.5          Microbiology Results (last 7 days)       Procedure Component Value Units Date/Time    AFB Smear [[id number]] Collected: 01/24/23 1051    Order Status: Completed Specimen: Body Fluid from Pleural Fluid, Left Updated: 01/25/23 0727     AFB Smear No AFB seen (Direct smear only)    Body Fluid Culture [[id number]] Collected: 01/24/23 1051    Order Status: Completed Specimen: Body Fluid from Pleural Fluid, Left Updated: 01/25/23 0657     Body Fluid Culture No Growth At 24 Hours    Anaerobic Culture [[id number]] Collected: 01/24/23 1051    Order Status: Sent Specimen: Body Fluid from Pleural Fluid, Left Updated: 01/24/23 1455    Fungal Culture [[id number]] Collected: 01/24/23 1051    Order Status: Sent Specimen: Body Fluid from Pleural Fluid, Left Updated: 01/24/23  1455    Blood Culture [[id number]]  (Normal) Collected: 01/18/23 0747    Order Status: Completed Specimen: Blood Updated: 01/23/23 1101     CULTURE, BLOOD ([facility]) No Growth at 5 days    Blood Culture [[id number]]  (Normal) Collected: 01/18/23 0747    Order Status: Completed Specimen: Blood Updated: 01/23/23 1101     CULTURE, BLOOD ([facility]) No Growth at 5 days             See below for Radiology    Scheduled Med:   amLODIPine  10 mg Oral Daily    benzonatate  100 mg Oral TID    calcium-vitamin D3  1 tablet Oral Daily    cholestyramine  1 packet Oral BID    enoxaparin  40 mg Subcutaneous Daily    Lactobacillus acidophilus  1 capsule Oral Daily    metoprolol tartrate  25 mg Oral BID    multivitamin  1 tablet Oral Daily    mupirocin  1 g Nasal BID    ondansetron  4 mg Intravenous Once    umeclidinium-vilanteroL  1 puff Inhalation Daily    ursodioL  500 mg Oral BID    valsartan  160 mg Oral Daily        Continuous Infusions:       PRN Meds:  albuterol-ipratropium, albuterol-ipratropium, aluminum-magnesium hydroxide-simethicone, amitriptyline, dextromethorphan-guaiFENesin  mg/5 ml, diphenhydrAMINE, HYDROcodone-acetaminophen, HYDROmorphone, HYDROmorphone, melatonin, metoclopramide HCl, miconazole NITRATE 2 %, ondansetron, ondansetron, polyethylene glycol, prochlorperazine, senna-docusate 8.6-50 mg, simethicone, sodium chloride 0.9%, traMADoL       Assessment/Plan:  Recurrent left-sided exudative pleural effusion  Left lower lobe community-acquired pneumonia-treated   Acute hypoxemic respiratory failure      History of:  Chronic migraine, chronic back pain status post stimulator, HTN, 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 Pain controlled  Has occ cough. No fever or chills  Lung exam was unremarkable   Completed antibiotics   F/U on pleural fluid cultures   Cont PT    Labs in am     VTE prophylaxis: Lovenox     Patient condition:   Guarded    Anticipated discharge and Disposition:         All diagnosis and differential diagnosis have been reviewed; assessment and plan has been documented; I have personally reviewed the labs and test results that are presently available; I have reviewed the patients medication list; I have reviewed the consulting providers response and recommendations. I have reviewed or attempted to review medical records based upon their availability    All of the patient's questions have been  addressed and answered. Patient's is agreeable to the above stated plan. I will continue to monitor closely and make adjustments to medical management as needed.  _____________________________________________________________________    Nutrition Status:    Radiology:  X-Ray Chest 1 View  Narrative: EXAMINATION:  XR CHEST 1 VIEW    CLINICAL HISTORY:  chest tube;    TECHNIQUE:  Single view of the chest    COMPARISON:  01/24/2023    FINDINGS:  Left-sided thoracostomy tube in place.  No gross pneumothorax.  The cardiomediastinal silhouette is unchanged.  Left pleural effusion remains.  Impression: As above.    Electronically signed by: Maury Mustafa  Date:    01/25/2023  Time:    06:48      Romario Cleveland MD   01/25/2023

## 2023-01-26 NOTE — NURSING
Nurses Note -- 4 Eyes      1/26/2023   12:44 PM      Skin assessed during: Daily Assessment      [] No Pressure Injuries Present    []Prevention Measures Documented      [x] Yes- Altered Skin Integrity Present or Discovered   [x] LDA Added if Not in Epic (Describe Wound)   [] New Altered Skin Integrity was Present on Admit and Documented in LDA   [x] Wound Image Taken    Wound Care Consulted? Yes    Attending Nurse:  Kacie Jennings RN     Second RN/Staff Member:  Elise Daniels

## 2023-01-26 NOTE — PLAN OF CARE
Problem: Occupational Therapy  Goal: Occupational Therapy Goal  Description: Goals to be met by: 2/9/23    Patient will increase functional independence with ADLs by performing:    LE Dressing with Modified [place], utilizing dressing AEDs if needed.  Grooming while standing at sink with Modified [place].  Toileting from toilet with Modified [place] for hygiene and clothing management.   Bathing from shower chair/bench with Modified [place].  Toilet transfer to toilet with Modified [place].    Outcome: Ongoing, Progressing

## 2023-01-26 NOTE — PT/OT/SLP RE-EVAL
Physical Therapy Re-evaluation    Patient Name:  Purnima Higgins   MRN:  [id number]    Recommendations:     Discharge Recommendations: home, home with home health, home health PT  Discharge Equipment Recommendations: walker, rolling, oxygen   Barriers to discharge: None    Assessment:     Purnima Higgins is a 82 y.o. female admitted with a medical diagnosis of Pleural effusion on left s/p VATS and pleurodesis with chest tube placement.  She presents with the following impairments/functional limitations: weakness, impaired endurance, impaired self care skills, impaired functional mobility, gait instability, impaired balance. Pt c/o L sided chest tube pain, but motivated to move. Pt had been sitting up in her chair since 4AM. Pt is requiring Min-CGA for mobility and ambulating on 2L/minO2. Pt will require HH PT at d/c.     Rehab Prognosis:  good; patient would benefit from acute skilled PT services to address these deficits and reach maximum level of function.      Recent Surgery: Procedure(s) (LRB):  VATS (VIDEO-ASSISTED THORACOSCOPIC SURGERY) (Left)  PLEURODESIS, USING TALC (Left) 2 Days Post-Op    Plan:     During this hospitalization, patient to be seen 6 x/week to address the above listed problems via gait training, therapeutic activities, therapeutic exercises  Plan of Care Expires:  02/26/23  Plan of Care Reviewed with: patient, spouse    Subjective     Communicated with nurse prior to session.  Patient found up in chair with chest tube, oxygen, PureWick, pulse ox (continuous), telemetry (spinal stimulator) upon PT entry to room, agreeable to evaluation.      Chief Complaint: chest tube pain  Patient comments/goals: to go home this weekend  Pain/Comfort:  Location - Side 1: Left  Location 1: rib(s)  Pain Addressed 1: Reposition    Patients cultural, spiritual, Yazidi conflicts given the current situation: no      Objective:     Patient found with: chest tube, oxygen, PureWick, pulse ox (continuous), telemetry  (spinal stimulator)     General Precautions: Standard, fall  Orthopedic Precautions:    Braces:    Respiratory Status: Nasal cannula, flow 2 L/min  95% SpO2 before and after ambulation.     Exams:  Cognitive Exam:  Patient is oriented to Person, Place, Time, and Situation  RLE ROM: WNL  RLE Strength: Deficits: 4/5 grossly  LLE ROM: WNL  LLE Strength: Deficits: 4/5 grossly    Functional Mobility:  Bed Mobility:     Sit to Supine: minimum assistance  Transfers:     Sit to Stand:  minimum assistance with rolling walker  Gait: 80ft with RW and CGA, slow pace, no LOB. Donned spinal stimulator battery band on prior to ambulation.     AM-PAC 6 CLICK MOBILITY  Total Score:18     Performed 2 sit<>stand transfers w Min A then CGA w RW.     Patient left supine with all lines intact, call button in reach, and nurse and  present.    GOALS:   Multidisciplinary Problems       Physical Therapy Goals          Problem: Physical Therapy    Goal Priority Disciplines Outcome Goal Variances Interventions   Physical Therapy Goal     PT, PT/OT Ongoing, Progressing     Description: Goals to be met by: 2023     Patient will increase functional independence with mobility by performin. Supine to sit with [place]  2. Sit to stand transfer with Modified [place]  3. Gait  x 200 feet with Modified [place] using Rolling Walker.                          History:     Past Medical History:   Diagnosis Date    Arthritis     Back pain     scs implant- Dr ritter    Dyslipidemia     Hypertension     Liver disease     Spinal cord disorder        Past Surgical History:   Procedure Laterality Date    APPENDECTOMY      BACK SURGERY       SECTION      CHOLECYSTECTOMY      LAPAROSCOPIC REPAIR OF INCISIONAL HERNIA N/A 6/15/2022    Procedure: REPAIR, HERNIA, INCISIONAL, LAPAROSCOPIC;  Surgeon: Nayan Gonzalez MD;  Location: [facility];  Service: General;  Laterality: N/A;    NECK SURGERY      PLEURODESIS USING TALC Left  1/24/2023    Procedure: PLEURODESIS, USING TALC;  Surgeon: Augie Mott MD;  Location: [facility];  Service: Cardiothoracic;  Laterality: Left;    SHOULDER SURGERY Bilateral     TOTAL KNEE ARTHROPLASTY      VIDEO-ASSISTED THORACOSCOPIC SURGERY (VATS) Left 1/24/2023    Procedure: VATS (VIDEO-ASSISTED THORACOSCOPIC SURGERY);  Surgeon: Augie Mott MD;  Location: [facility];  Service: Cardiothoracic;  Laterality: Left;  LEFT       Time Tracking:     PT Received On: 01/26/23  PT Start Time: 1319     PT Stop Time: 1344  PT Total Time (min): 25 min     Billable Minutes: Re-eval 25 01/26/2023

## 2023-01-26 NOTE — NURSING
Subjective:       Patient ID: Purnima Higgins is a 82 y.o. female.    Chief Complaint: Shortness of Breath (Reports cough x 3 weeks and has been attempting outpatient treatment with PCP. ( Joann Wallace NP called to say pt has LLL pneumonia, pleural effusion and she has treated with doxycycline, prednisone, and is on day 10 of levaquin). New onset swelling in BL feet/ankles x 2 days. TMAX 100.2/Pt has implanted stimulator and does not have remote with her to turn off for EKG)    HPI  Review of Systems    Objective:      Physical Exam    Assessment:       1. Pleural effusion on left    2. SOB (shortness of breath)    3. Hyponatremia    4. Bilateral lower extremity edema    5. Chest pain    6. Osteopenia, unspecified location    7. Pleural effusion    8. Sepsis    9. Loculated pleural effusion           Altered Skin Integrity 01/26/23 1243 Buttocks #1 Intact skin with non-blanchable redness of localized area (Active)   01/26/23 1243   Altered Skin Integrity Present on Admission:    Side:    Orientation:    Location: Buttocks   Wound Number: #1   Is this injury device related?:    Primary Wound Type:    Description of Altered Skin Integrity: Intact skin with non-blanchable redness of localized area   Ankle-Brachial Index:    Pulses:    Removal Indication and Assessment:    Wound Outcome:    (Retired) Wound Length (cm):    (Retired) Wound Width (cm):    (Retired) Depth (cm):    Wound Description (Comments):    Removal Indications:    Wound Image   01/26/23 1320   Drainage Amount None 01/26/23 1320   Appearance Pink;Maroon;Dry 01/26/23 1320   Periwound Area Dry;Pale white 01/26/23 1320   Wound Edges Irregular 01/26/23 1320            Incision/Site 01/24/23 1058 Left Chest (Active)   01/24/23 1058   Present Prior to Hospital Arrival?:    Side: Left   Location: Chest   Orientation:    Incision Type:    Closure Method:    Additional Comments:    Removal Indication and Assessment:    Wound Outcome:    Removal Indications:     Dressing Appearance Intact;Dry;Clean 01/25/23 2000   Drainage Amount Scant 01/25/23 2000   Drainage Characteristics/Odor Serosanguineous 01/25/23 2000   Appearance Dressing in place, unable to visualize 01/25/23 2000   Dressing Gauze;Island/border 01/25/23 2000           Plan:         Pleural effusion on left  Patient has a recurrent left pleural effusion which was found to be serosanguineous on thoracentesis.  Elevated CA I 25 but no primary cancer diagnosis as of this time.  As the patient has already been treated with conservative measures and the suspicion of malignancy persists, I agree that video-assisted thoracoscopic surgery would be the best option for drainage of the pleural effusion and to allow for definitive evaluation for primary or metastatic carcinoma.  Will schedule left video-assisted thoracoscopic surgery and drainage of left pleural effusion with pleural biopsy on Tuesday January 24, 2023.  Risks, benefits, and alternatives have been discussed.  Questions have been answered.  The patient voices understanding and agrees to proceed.    Consulted for ulceration on buttock-see new photo.   Care put in place with instructions  To nurse Hester.   Will follow up in a few days.

## 2023-01-26 NOTE — PROGRESS NOTES
[facility] - 5th Floor Med Surg  Pulmonary/Critical Care - Medicine  Progress Note    Patient Name: Purnima Higgins  MRN: [id number]  Admission Date: 1/13/2023  Hospital Length of Stay: 13 days  Code Status: Full Code  Attending Provider: Romario Cleveland MD  Primary Care Provider: Jos Briseno MD     Subjective:     HPI:   This is an 82-year-old female with a history of HTN, HLD, back pain with implanted stimulator who has been experiencing a cough x 3 weeks. She failed outpatient treatment by PCP having received doxycycline, prednisone and Levaquin. She was then referred to the ED for further evaluation on 1/13/23. She also reported new onset swelling in her lower extremities data to presentation. CXR in ER revealed a small left pleural effusion, unchanged from previous imaging. CT of chest redemonstrated left pleural effusion. She was admitted to hospital medicine and pulmonary was consulted for evaluation of pleural effusion.  She underwent left thoracentesis on 01/16/23, removing ~300cc of thin, serosanguinous fluid.  Transthoracic echocardiogram 01/17/2023 with normal LVSF, EF 60%, grade 1 diastolic dysfunction.  There is a small mobile echogenic structure on the aortic valve with some calcification.  THEODORA was performed 01/19/23,  demonstrating no evidence of aortic valve vegetation with some aortic valvular calcification noted.  CV surgery performed left VATS with talc pleurodesis on 01/24/2023, noting no evidence of tumor grossly.  Intraoperative cytology from fluid again returned with no evidence of malignancy.    Interval History/Significant Events:   She is afebrile.  All cultures continue no growth.  She is sitting up out of bed in chair.  No air leak with cough per chest tube.  She had about 240 cc of serous fluid output per chest tube over the past 24 hours.    Scheduled Medications:   amLODIPine  10 mg Oral Daily    benzonatate  100 mg Oral TID    calcium-vitamin D3  1 tablet Oral  Daily    cholestyramine  1 packet Oral BID    enoxaparin  40 mg Subcutaneous Daily    Lactobacillus acidophilus  1 capsule Oral Daily    metoprolol tartrate  25 mg Oral BID    multivitamin  1 tablet Oral Daily    mupirocin  1 g Nasal BID    ondansetron  4 mg Intravenous Once    umeclidinium-vilanteroL  1 puff Inhalation Daily    ursodioL  500 mg Oral BID    valsartan  160 mg Oral Daily     PRN Medications:  albuterol-ipratropium, albuterol-ipratropium, aluminum-magnesium hydroxide-simethicone, amitriptyline, dextromethorphan-guaiFENesin  mg/5 ml, diphenhydrAMINE, HYDROcodone-acetaminophen, HYDROmorphone, HYDROmorphone, melatonin, metoclopramide HCl, miconazole NITRATE 2 %, ondansetron, ondansetron, polyethylene glycol, prochlorperazine, senna-docusate 8.6-50 mg, simethicone, sodium chloride 0.9%, traMADoL  Continuous Infusions:      Past Medical History:   Diagnosis Date    Arthritis     Back pain     scs implant- Dr ritter    Dyslipidemia     Hypertension     Liver disease     Spinal cord disorder        Past Surgical History:   Procedure Laterality Date    APPENDECTOMY      BACK SURGERY       SECTION      CHOLECYSTECTOMY      LAPAROSCOPIC REPAIR OF INCISIONAL HERNIA N/A 6/15/2022    Procedure: REPAIR, HERNIA, INCISIONAL, LAPAROSCOPIC;  Surgeon: Nayan Gonzalez MD;  Location: [facility];  Service: General;  Laterality: N/A;    NECK SURGERY      PLEURODESIS USING TALC Left 2023    Procedure: PLEURODESIS, USING TALC;  Surgeon: Augie Mott MD;  Location: [facility];  Service: Cardiothoracic;  Laterality: Left;    SHOULDER SURGERY Bilateral     TOTAL KNEE ARTHROPLASTY      VIDEO-ASSISTED THORACOSCOPIC SURGERY (VATS) Left 2023    Procedure: VATS (VIDEO-ASSISTED THORACOSCOPIC SURGERY);  Surgeon: Augie Mott MD;  Location: [facility] OR;  Service: Cardiothoracic;  Laterality: Left;  LEFT       Objective:     Input/output:    Intake/Output Summary (Last 24 hours) at 2023 1020  Last data filed at  1/26/2023 0602  Gross per 24 hour   Intake 900 ml   Output 980 ml   Net -80 ml         Vital Signs (Most Recent):  Temp: 98.4 °F (36.9 °C) (01/26/23 0723)  Pulse: 68 (01/26/23 0840)  Resp: 18 (01/26/23 0427)  BP: (!) 143/76 (01/26/23 0840)  SpO2: 95 % (01/26/23 0723)    Body mass index is 36.22 kg/m².  Weight: 78.6 kg (173 lb 4.5 oz) Vital Signs (24h Range):  Temp:  [98 °F (36.7 °C)-98.5 °F (36.9 °C)] 98.4 °F (36.9 °C)  Pulse:  [61-77] 68  Resp:  [16-20] 18  SpO2:  [93 %-96 %] 95 %  BP: (126-148)/(68-78) 143/76     Physical Exam  Constitutional:       Appearance: She is obese.   HENT:      Mouth/Throat:      Mouth: Mucous membranes are moist.      Pharynx: Oropharynx is clear.   Eyes:      Conjunctiva/sclera: Conjunctivae normal.      Pupils: Pupils are equal, round, and reactive to light.   Cardiovascular:      Rate and Rhythm: Normal rate and regular rhythm.   Pulmonary:      Breath sounds: Rhonchi (Few bilateral coarse rhonchi posterior bases) present. No wheezing or rales.   Abdominal:      General: Bowel sounds are normal.      Palpations: Abdomen is soft.   Musculoskeletal:      Right lower leg: No edema.      Left lower leg: No edema.   Lymphadenopathy:      Cervical: No cervical adenopathy.   Skin:     General: Skin is warm and dry.   Neurological:      Mental Status: She is alert and oriented to person, place, and time.       Lines/Drains/Airways       Drain  Duration                  Chest Tube Tube - 1 Left -- days              Peripheral Intravenous Line  Duration                  Peripheral IV - Single Lumen 01/24/23 1001 20 G Left Forearm 2 days         Peripheral IV - Single Lumen 01/24/23 1009 20 G Right Wrist 2 days                  Significant Labs:    Lab Results   Component Value Date    WBC 11.4 01/26/2023    HGB 11.1 (L) 01/26/2023    HCT 32.3 (L) 01/26/2023    MCV 89.2 01/26/2023     01/26/2023         Recent Labs   Lab 01/26/23  0556   *   K 4.2   CO2 25   BUN 17.3   CREATININE  0.72   CALCIUM 9.1         Imaging:  Chest x-ray (01/26/2023, my reading of images):  Left chest tube good placement.  Mild opacification left base and silhouetting of the left hemidiaphragm.  Chest tubes appear good in placement with lung well inflated.    Assessment/Plan:     Neutrophil predominant serosanguineous exudative left pleural effusion, all cytology negative for malignancy.  No culture growth.  She is postoperative right VATS with talc pleurodesis on 01/19/2023.  Clinical picture most consistent with a chronic pleuritis.  Reported history primary biliary cirrhosis  Hyponatremia  Obesity, BMI 36.6       Plan:  Continue chest tube drainage for time being  Continue mobilization up out of bed as tolerated       Chhaya Jefferson MD, FCCP  Pulmonary/Critical Care  [facility] - 5th Floor Med Surg

## 2023-01-27 VITALS
DIASTOLIC BLOOD PRESSURE: 76 MMHG | HEIGHT: 58 IN | OXYGEN SATURATION: 92 % | RESPIRATION RATE: 18 BRPM | TEMPERATURE: 99 F | HEART RATE: 61 BPM | SYSTOLIC BLOOD PRESSURE: 131 MMHG | BODY MASS INDEX: 36.14 KG/M2 | WEIGHT: 172.19 LBS

## 2023-01-27 LAB
ANION GAP SERPL CALC-SCNC: 7 MEQ/L
BACTERIA SPEC ANAEROBE CULT: NORMAL
BASOPHILS # BLD AUTO: 0.04 X10(3)/MCL (ref 0–0.2)
BASOPHILS NFR BLD AUTO: 0.3 %
BUN SERPL-MCNC: 12.1 MG/DL (ref 9.8–20.1)
CALCIUM SERPL-MCNC: 8.4 MG/DL (ref 8.4–10.2)
CHLORIDE SERPL-SCNC: 97 MMOL/L (ref 98–107)
CO2 SERPL-SCNC: 22 MMOL/L (ref 23–31)
CREAT SERPL-MCNC: 0.56 MG/DL (ref 0.55–1.02)
CREAT/UREA NIT SERPL: 22
EOSINOPHIL # BLD AUTO: 0.8 X10(3)/MCL (ref 0–0.9)
EOSINOPHIL NFR BLD AUTO: 6.3 %
ERYTHROCYTE [DISTWIDTH] IN BLOOD BY AUTOMATED COUNT: 13.2 % (ref 11.5–17)
GFR SERPLBLD CREATININE-BSD FMLA CKD-EPI: >60 MLS/MIN/1.73/M2
GLUCOSE SERPL-MCNC: 91 MG/DL (ref 82–115)
HCT VFR BLD AUTO: 30.1 % (ref 37–47)
HGB BLD-MCNC: 10.1 GM/DL (ref 12–16)
IMM GRANULOCYTES # BLD AUTO: 0.08 X10(3)/MCL (ref 0–0.04)
IMM GRANULOCYTES NFR BLD AUTO: 0.6 %
LYMPHOCYTES # BLD AUTO: 1.6 X10(3)/MCL (ref 0.6–4.6)
LYMPHOCYTES NFR BLD AUTO: 12.6 %
MCH RBC QN AUTO: 30.2 PG
MCHC RBC AUTO-ENTMCNC: 33.6 MG/DL (ref 33–36)
MCV RBC AUTO: 90.1 FL (ref 80–94)
MONOCYTES # BLD AUTO: 1.29 X10(3)/MCL (ref 0.1–1.3)
MONOCYTES NFR BLD AUTO: 10.2 %
NEUTROPHILS # BLD AUTO: 8.85 X10(3)/MCL (ref 2.1–9.2)
NEUTROPHILS NFR BLD AUTO: 70 %
NRBC BLD AUTO-RTO: 0 %
PLATELET # BLD AUTO: 272 X10(3)/MCL (ref 130–400)
PMV BLD AUTO: 10.4 FL (ref 7.4–10.4)
POTASSIUM SERPL-SCNC: 4.5 MMOL/L (ref 3.5–5.1)
RBC # BLD AUTO: 3.34 X10(6)/MCL (ref 4.2–5.4)
SODIUM SERPL-SCNC: 126 MMOL/L (ref 136–145)
WBC # SPEC AUTO: 12.7 X10(3)/MCL (ref 4.5–11.5)

## 2023-01-27 PROCEDURE — 85025 COMPLETE CBC W/AUTO DIFF WBC: CPT | Performed by: INTERNAL MEDICINE

## 2023-01-27 PROCEDURE — 80048 BASIC METABOLIC PNL TOTAL CA: CPT | Performed by: INTERNAL MEDICINE

## 2023-01-27 PROCEDURE — 99024 POSTOP FOLLOW-UP VISIT: CPT | Mod: POP,,, | Performed by: PHYSICIAN ASSISTANT

## 2023-01-27 PROCEDURE — 99024 PR POST-OP FOLLOW-UP VISIT: ICD-10-PCS | Mod: POP,,, | Performed by: PHYSICIAN ASSISTANT

## 2023-01-27 PROCEDURE — 97110 THERAPEUTIC EXERCISES: CPT | Mod: CQ

## 2023-01-27 PROCEDURE — 25000003 PHARM REV CODE 250: Performed by: INTERNAL MEDICINE

## 2023-01-27 PROCEDURE — 97116 GAIT TRAINING THERAPY: CPT | Mod: CQ

## 2023-01-27 PROCEDURE — 25000003 PHARM REV CODE 250: Performed by: PHYSICIAN ASSISTANT

## 2023-01-27 PROCEDURE — 97535 SELF CARE MNGMENT TRAINING: CPT

## 2023-01-27 RX ORDER — METOPROLOL TARTRATE 25 MG/1
25 TABLET, FILM COATED ORAL 2 TIMES DAILY
Qty: 60 TABLET | Refills: 11 | Status: SHIPPED | OUTPATIENT
Start: 2023-01-27 | End: 2023-03-30 | Stop reason: SDUPTHER

## 2023-01-27 RX ADMIN — HYDROCODONE BITARTRATE AND ACETAMINOPHEN 1 TABLET: 5; 325 TABLET ORAL at 06:01

## 2023-01-27 RX ADMIN — MUPIROCIN 1 G: 20 OINTMENT TOPICAL at 09:01

## 2023-01-27 RX ADMIN — BENZONATATE 100 MG: 100 CAPSULE ORAL at 09:01

## 2023-01-27 RX ADMIN — AMLODIPINE BESYLATE 10 MG: 5 TABLET ORAL at 09:01

## 2023-01-27 RX ADMIN — URSODIOL 500 MG: 250 TABLET ORAL at 09:01

## 2023-01-27 RX ADMIN — Medication: at 09:01

## 2023-01-27 RX ADMIN — Medication 1 TABLET: at 09:01

## 2023-01-27 RX ADMIN — METOPROLOL TARTRATE 25 MG: 25 TABLET, FILM COATED ORAL at 09:01

## 2023-01-27 RX ADMIN — VALSARTAN 160 MG: 80 TABLET, FILM COATED ORAL at 09:01

## 2023-01-27 RX ADMIN — Medication 1 CAPSULE: at 09:01

## 2023-01-27 RX ADMIN — THERA TABS 1 TABLET: TAB at 09:01

## 2023-01-27 RX ADMIN — CHOLESTYRAMINE 4 G: 4 POWDER, FOR SUSPENSION ORAL at 09:01

## 2023-01-27 NOTE — NURSING
Pt aao. Vss. Iv and tele removed. Pt and  instructed on dc instructions, medications, and follow up appointments, verbalized understanding. Pt sx site dry and intact, no s/s of infection. Pt instructed on s/s of infection and when to notify physician, verbalized understanding. Pt dc via private vehicle.

## 2023-01-27 NOTE — DISCHARGE SUMMARY
[facility] Hospital Medicine Discharge Summary    Admit Date: 1/13/2023  Discharge Date and Time: 1/27/20233:36 PM  Admitting Physician: ANGELICA Team  Discharging Physician: Romario Cleveland MD.  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  Patient was prescribed Levaquin, doxycycline, prednisone by PCP with no symptomatic improvement.  Also noted bilateral lower extremity swelling.  Given persistent complaints she decided to come to the ED.  Chest x-ray revealed a small left pleural effusion paid CT chest confirmed this.  Patient was admitted hospitalist medicine service.  Pulmonary was consulted for evaluation of pleural effusion.  Patient underwent left-sided thoracentesis on 01/16.  Echocardiogram on 01/17 with normal ejection fraction, grade 1 diastolic dysfunction.  Concern for small mobile echogenic structure on the aortic valve which needed further evaluation.  Cardiology consulted for THEODORA.  Left-sided pleural effusion consistent with exudative effusion.  Cytology negative for malignancy and cultures negative.  Patient was initiated on Zosyn on admit which was being continued.  Patient remains hemodynamically stable.  No leukocytosis and afebrile.  Zosyn discontinued by pulmonology 1/19.      Repeat chest x-ray with persistent effusion and therefore a CT thorax was obtained which  showed worsening effusion.  Geovany negative for vegetation.  Discussed with pulmonology, concerns for malignant effusion.      Tumor markers, CT abdomen/pelvis ordered.   was elevated. Pelvic US and CT abdomen was unremarkable.  Pulmonology recommended consulting CT surgery for possible VATS. This was done on 1/24/23. Pleural fluid was negative for malignancy and also infection. She was ambulating with PT. Chest tube was removed and she was discharged home with HH and PT.     Pt was seen and examined on the day of discharge  Vitals:  VITAL SIGNS: 24 HRS MIN & MAX LAST   Temp  Min: 98 °F (36.7 °C)  Max: 98.9 °F (37.2 °C) 98.9 °F (37.2 °C)   BP  Min: 131/76  Max: 155/78 131/76   Pulse  Min: 61  Max: 75  61   Resp  Min: 17  Max: 20 18   SpO2  Min: 92 %  Max: 96 % (!) 92 %         Physical Exam:  Heart RRR  Lungs clear   Abdomen soft and non tender   Neuro: No FND      Procedures Performed: No admission procedures for hospital encounter.     Significant Diagnostic Studies: See Full reports for all details    Recent Labs   Lab 01/25/23  0545 01/26/23  0556 01/27/23  0339   WBC 12.7* 11.4 12.7*   RBC 4.01* 3.62* 3.34*   HGB 12.2 11.1* 10.1*   HCT 37.0 32.3* 30.1*   MCV 92.3 89.2 90.1   MCH 30.4 30.7 30.2   MCHC 33.0 34.4 33.6   RDW 13.3 13.2 13.2    273 272   MPV 10.3 10.2 10.4       Recent Labs   Lab 01/21/23  0639 01/22/23  0637 01/24/23  0353 01/25/23  0545 01/26/23  0556 01/27/23  0339   * 133*   < > 130* 123* 126*   K 4.3 4.3   < > 5.2* 4.2 4.5   CO2 26 28   < > 27 25 22*   BUN 7.2* 5.9*   < > 18.5 17.3 12.1   CREATININE 0.60 0.62   < > 1.15* 0.72 0.56   CALCIUM 8.5 8.7   < > 9.0 9.1 8.4   MG 1.90 1.90  --   --   --   --    ALBUMIN 2.4* 2.4*  --  3.0*  --   --    ALKPHOS 101 112  --  169*  --   --    ALT 15 13  --  16  --   --    AST 22 20  --  30  --   --    BILITOT 0.4 0.4  --  0.5  --   --     < > = values in this interval not displayed.        Microbiology Results (last 7 days)       Procedure  Component Value Units Date/Time    Body Fluid Culture [[id number]] Collected: 01/24/23 1051    Order Status: Completed Specimen: Body Fluid from Pleural Fluid, Left Updated: 01/27/23 0914     Body Fluid Culture No Growth At 72 Hours    Anaerobic Culture [[id number]] Collected: 01/24/23 1051    Order Status: Completed Specimen: Body Fluid from Pleural Fluid, Left Updated: 01/27/23 0729     Anaerobe Culture No Anaerobes Isolated    AFB Smear [[id number]] Collected: 01/24/23 1051    Order Status: Completed Specimen: Body Fluid from Pleural Fluid, Left Updated: 01/25/23 0727     AFB Smear No AFB seen (Direct smear only)    Fungal Culture [[id number]] Collected: 01/24/23 1051    Order Status: Sent Specimen: Body Fluid from Pleural Fluid, Left Updated: 01/24/23 1455    Blood Culture [[id number]]  (Normal) Collected: 01/18/23 0747    Order Status: Completed Specimen: Blood Updated: 01/23/23 1101     CULTURE, BLOOD ([facility]) No Growth at 5 days    Blood Culture [[id number]]  (Normal) Collected: 01/18/23 0747    Order Status: Completed Specimen: Blood Updated: 01/23/23 1101     CULTURE, BLOOD ([facility]) No Growth at 5 days             X-Ray Chest 1 View  Narrative: EXAMINATION:  XR CHEST 1 VIEW    CPT 12277    CLINICAL HISTORY:  CT placement;    COMPARISON:  January 26, 2023    FINDINGS:  Examination reveals cardiomediastinal silhouette and pleuroparenchymal changes to be essentially unchanged as compared with the previous exam    Support catheters remain in place  Impression: No significant change    Electronically signed by: Joseluis Lewis  Date:    01/27/2023  Time:    08:32         Medication List        START taking these medications      metoprolol tartrate 25 MG tablet  Commonly known as: LOPRESSOR  Take 1 tablet (25 mg total) by mouth 2 (two) times daily.            CONTINUE taking these medications      albuterol 90 mcg/actuation inhaler  Commonly known as: VENTOLIN HFA  Inhale 2 puffs into the lungs every 4 (four) hours as  needed for Wheezing. Disp 1 inhaler     amitriptyline 25 MG tablet  Commonly known as: ELAVIL  Take 1-2 po qHS PRN headache     amLODIPine 10 MG tablet  Commonly known as: NORVASC  Take 1 tablet (10 mg total) by mouth once daily.     aspirin 81 MG EC tablet  Commonly known as: ECOTRIN     calcium-vitamin D3 500 mg-5 mcg (200 unit) per tablet  Commonly known as: OS-BIBIANA 500 + D3     cholestyramine 4 gram packet  Commonly known as: QUESTRAN  TAKE 1 PACKET TWICE A DAY     coenzyme Q10 100 mg capsule     EScitalopram oxalate 5 MG Tab  Commonly known as: LEXAPRO     guaiFENesin-codeine 100-10 mg/5 ml  mg/5 mL syrup  Commonly known as: TUSSI-ORGANIDIN NR  5-10mL po q4h prn cough     ibandronate 150 mg tablet  Commonly known as: BONIVA  Take 1 tablet (150 mg total) by mouth every 30 days. for 12 doses     L.acidophilus-Bifido.longum 1 billion cell Cpdr     multivitamin per tablet  Commonly known as: THERAGRAN     oxybutynin 10 MG 24 hr tablet  Commonly known as: DITROPAN-XL  Take 1 tablet (10 mg total) by mouth once daily.     umeclidinium-vilanteroL 62.5-25 mcg/actuation Dsdv  Commonly known as: ANORO ELLIPTA     ursodioL 250 mg Tab  Commonly known as: ACTIGALL     valsartan 160 MG tablet  Commonly known as: DIOVAN  Take 1 tablet (160 mg total) by mouth once daily.            STOP taking these medications      promethazine-dextromethorphan 6.25-15 mg/5 mL Syrp  Commonly known as: PROMETHAZINE-DM               Where to Get Your Medications        These medications were sent to [facility]/pharmacy #[id number] - [place]les - [address] AT [address]  [address] [address]      Phone: [phone]   metoprolol tartrate 25 MG tablet          Explained in detail to the patient about the discharge plan, medications, and follow-up visits. Pt understands and agrees with the treatment plan  Discharge Disposition: Home-Health Care [facility]   Discharged Condition: stable  Diet-   Dietary Orders (From admission,  onward)       Start     Ordered    01/24/23 1111  Diet heart healthy  (Diet/Nutrition [facility])  Diet effective now         01/24/23 1111    01/19/23 1402  Dietary nutrition supplements Boost Plus Vanilla; BID  Continuous        Question Answer Comment   Select PO Supplement: Boost Plus Vanilla    Frequency: BID        01/19/23 1402                   Medications Per DC med rec  Activities as tolerated   Follow-up Information       [facility] Follow up.    Specialty: Home Health Services  Why: Your home health will see you the day after discharge  Contact information:  [address]  [facility] [address]  [place] [id number] [phone]               Jos Briseon MD Follow up in 2 week(s).    Specialty: Family Medicine  Why: The hospital will call you after you are discharged with the date and time of your appointment.  Contact information:  [address]  [place] [address] [phone]               Augie Mott MD Follow up in 2 week(s).    Specialties: Cardiothoracic Surgery, Cardiology  Why: The hospital will call you after you are discharged with the date and time of your appointment.  Contact information:  [address]  [address]  [place] [address]  [phone]               Chhaya Jefferson MD, [facility] Follow up in 2 week(s).    Specialty: Pulmonary Disease  Why: The hospital will call you after you are discharged with the date and time of your appointment.  Contact information:  [address]  [address]  [place] [address]  [phone]               Dom Smith MD Follow up in 2 week(s).    Specialty: Cardiology  Why: The hospital will call you after you are discharged with the date and time of your appointment.  Contact information:  [address]  [facility] [id number] [phone]                           For further questions contact hospitalist office    Discharge time 33 minutes    For worsening symptoms, chest pain, shortness of breath, increased  abdominal pain, high grade fever, stroke or stroke like symptoms, immediately go to the nearest Emergency Room or call 911 as soon as possible.      Romario Hall M.D on 1/27/2023. at 3:36 PM.

## 2023-01-27 NOTE — PT/OT/SLP PROGRESS
"Occupational Therapy   Treatment    Name: Purnima Higgins  MRN: [id number]  Admitting Diagnosis:  Pleural effusion on left  Recent Surgery: Procedure(s) (LRB):  VATS (VIDEO-ASSISTED THORACOSCOPIC SURGERY) (Left)  PLEURODESIS, USING TALC (Left)  3 Days Post-Op    Recommendations:     Discharge Recommendations: home with home health and assist provided from   Discharge Equipment Recommendations: walker, rolling, dressing AEDs, possibly oxygen (TBD, pending progress)  Barriers to discharge: none    Assessment:     Purnima Higgins is a 82 y.o. female with a medical diagnosis of Pleural effusion on left.  She presents with wheezing and O2 desaturation with activity. Performance deficits affecting function are weakness, impaired endurance, impaired self care skills, impaired functional mobility, gait instability, impaired balance, decreased safety awareness, pain, impaired cardiopulmonary response to activity.     Rehab Prognosis:  Good; patient would benefit from acute skilled OT services to address these deficits and reach maximum level of function.       Plan:     Patient to be seen 5 x/week, 6 x/week to address the above listed problems via self-care/home management, therapeutic activities, therapeutic exercises  Plan of Care Expires: 02/09/23  Plan of Care Reviewed with: patient, spouse    Subjective     Pain/Comfort:  Pt reported that she experiences "little ache" when deep breathing.    Pt and pt's  reported that they own a pulse ox and LH sponge to utilize at home.    Objective:     Communicated with: RN prior to session. Patient found up in chair with telemetry, pulse ox (continuous) upon OT entry to room.    General Precautions: Standard, fall    Orthopedic Precautions:N/A  Braces: N/A  Respiratory Status: Room air  Vitals:  O2: 88-94%. RN notified.     Occupational Performance:     Activities of Daily Living:  Lower Body Dressing: Pt doffed/donned B socks with SBA, vcs, rest breaks, and use of " sock-aid while seated in chair.    Education:  Pt was issued a sock-aid, and OT provided education on how to utilize sock-aid in order to increase pt's independence with LB dressing task post-d/c. Pt verbalized and demonstrated good understanding during session.  OT also educated pt on energy conservation techniques to utilize post-d/c in order to increase pt's safety and independence with ADL/IADL participation. Pt verbalized good understanding.     Patient left up in chair with all lines intact, call button in reach, and pt's  present.    GOALS:   Multidisciplinary Problems       Occupational Therapy Goals          Problem: Occupational Therapy    Goal Priority Disciplines Outcome Interventions   Occupational Therapy Goal     OT, PT/OT Ongoing, Progressing    Description: Goals to be met by: 2/9/23    Patient will increase functional independence with ADLs by performing:    LE Dressing with Modified [place], utilizing dressing AEDs if needed.  Grooming while standing at sink with Modified [place].  Toileting from toilet with Modified [place] for hygiene and clothing management.   Bathing from shower chair/bench with Modified [place].  Toilet transfer to toilet with Modified [place].                         Time Tracking:     OT Date of Treatment: 1/27/23  OT Start Time: 1346  OT Stop Time: 1404  OT Total Time (min): 18 min    Billable Minutes:Self Care/Home Management 18 mins    OT/BOB: OT     BOB Visit Number: 1 1/27/2023

## 2023-01-27 NOTE — PROGRESS NOTES
[facility]  Hospital Medicine Progress Note        Chief Complaint: Inpatient Follow-up for Left pleural effusion     HPI:   82-year-old female with significant history of HTN, HLD, chronic back pain status post spinal stimulator placement presented to the ED with worsening cough x3 weeks.  Patient was prescribed Levaquin, doxycycline, prednisone by PCP with no symptomatic improvement.  Also noted bilateral lower extremity swelling.  Given persistent complaints she decided to come to the ED.  Chest x-ray revealed a small left pleural effusion paid CT chest confirmed this.  Patient was admitted hospitalist medicine service.  Pulmonary was consulted for evaluation of pleural effusion.  Patient underwent left-sided thoracentesis on 01/16.  Echocardiogram on 01/17 with normal ejection fraction, grade 1 diastolic dysfunction.  Concern for small mobile echogenic structure on the aortic valve which needed further evaluation.  Cardiology consulted for GEOVANY.  Left-sided pleural effusion consistent with exudative effusion.  Cytology negative for malignancy and cultures negative.  Patient was initiated on Zosyn on admit which was being continued.  Patient remains hemodynamically stable.  No leukocytosis and afebrile.  Zosyn discontinued by pulmonology 1/19.  Repeat chest x-ray with persistent effusion and therefore a CT thorax was obtained which showed worsening effusion.  Geovany negative for vegetation.  Discussed with pulmonology, concerns for malignant effusion.  Tumor markers, CT abdomen/pelvis ordered.  Pulmonology recommended consulting CT surgery for possible VATS. This was done on 1/24/23. Pleural fluid was negative for malignancy and also infection.     Interval Hx:   Patient awake and comfortable. No new issues, Has been afebrile.   Ambulating with PT.     Objective/physical exam:  General: In no acute distress  Chest: Clear to auscultation bilaterally  Heart: RRR, +S1, S2, no appreciable  murmur  Abdomen: Soft, nontender, BS +  MSK: Warm, no lower extremity edema, no clubbing or cyanosis  Neurologic: Alert and oriented x4, Cranial nerve II-XII intact, Strength 5/5 in all 4 extremities    VITAL SIGNS: 24 HRS MIN & MAX LAST   Temp  Min: 97.7 °F (36.5 °C)  Max: 98.6 °F (37 °C) 98.6 °F (37 °C)   BP  Min: 121/68  Max: 148/75 (!) 147/75   Pulse  Min: 61  Max: 77  77   Resp  Min: 18  Max: 20 18   SpO2  Min: 93 %  Max: 96 % (!) 93 %       Recent Labs   Lab 01/24/23  0353 01/25/23  0545 01/26/23  0556   WBC 7.6 12.7* 11.4   RBC 3.25* 4.01* 3.62*   HGB 9.9* 12.2 11.1*   HCT 28.9* 37.0 32.3*   MCV 88.9 92.3 89.2   MCH 30.5 30.4 30.7   MCHC 34.3 33.0 34.4   RDW 13.2 13.3 13.2    366 273   MPV 10.3 10.3 10.2       Recent Labs   Lab 01/20/23  0329 01/21/23  0639 01/22/23  0637 01/24/23  0353 01/25/23  0545 01/26/23  0556   * 130* 133* 130* 130* 123*   K 3.8 4.3 4.3 3.9 5.2* 4.2   CO2 27 26 28 26 27 25   BUN 5.3* 7.2* 5.9* 7.2* 18.5 17.3   CREATININE 0.55 0.60 0.62 0.53* 1.15* 0.72   CALCIUM 8.2* 8.5 8.7 8.3* 9.0 9.1   MG 1.80 1.90 1.90  --   --   --    ALBUMIN 2.4* 2.4* 2.4*  --  3.0*  --    ALKPHOS 101 101 112  --  169*  --    ALT 15 15 13  --  16  --    AST 24 22 20  --  30  --    BILITOT 0.5 0.4 0.4  --  0.5  --           Microbiology Results (last 7 days)       Procedure Component Value Units Date/Time    Body Fluid Culture [[id number]] Collected: 01/24/23 1051    Order Status: Completed Specimen: Body Fluid from Pleural Fluid, Left Updated: 01/26/23 0819     Body Fluid Culture No Growth At 48 Hours    Anaerobic Culture [[id number]] Collected: 01/24/23 1051    Order Status: Completed Specimen: Body Fluid from Pleural Fluid, Left Updated: 01/26/23 0708     Anaerobe Culture No Anaerobes Isolated    AFB Smear [[id number]] Collected: 01/24/23 1051    Order Status: Completed Specimen: Body Fluid from Pleural Fluid, Left Updated: 01/25/23 0727     AFB Smear No AFB seen (Direct smear only)    Fungal Culture  [[id number]] Collected: 01/24/23 1051    Order Status: Sent Specimen: Body Fluid from Pleural Fluid, Left Updated: 01/24/23 1455    Blood Culture [[id number]]  (Normal) Collected: 01/18/23 0747    Order Status: Completed Specimen: Blood Updated: 01/23/23 1101     CULTURE, BLOOD ([facility]) No Growth at 5 days    Blood Culture [[id number]]  (Normal) Collected: 01/18/23 0747    Order Status: Completed Specimen: Blood Updated: 01/23/23 1101     CULTURE, BLOOD ([facility]) No Growth at 5 days             See below for Radiology    Scheduled Med:   amLODIPine  10 mg Oral Daily    benzonatate  100 mg Oral TID    calcium-vitamin D3  1 tablet Oral Daily    cholestyramine  1 packet Oral BID    enoxaparin  40 mg Subcutaneous Daily    Lactobacillus acidophilus  1 capsule Oral Daily    metoprolol tartrate  25 mg Oral BID    multivitamin  1 tablet Oral Daily    mupirocin  1 g Nasal BID    ondansetron  4 mg Intravenous Once    umeclidinium-vilanteroL  1 puff Inhalation Daily    ursodioL  500 mg Oral BID    valsartan  160 mg Oral Daily    zinc oxide-cod liver oil   Topical (Top) BID        Continuous Infusions:   sodium chloride 0.9% 75 mL/hr at 01/26/23 1151        PRN Meds:  albuterol-ipratropium, albuterol-ipratropium, aluminum-magnesium hydroxide-simethicone, amitriptyline, dextromethorphan-guaiFENesin  mg/5 ml, diphenhydrAMINE, HYDROcodone-acetaminophen, HYDROmorphone, HYDROmorphone, melatonin, metoclopramide HCl, miconazole NITRATE 2 %, ondansetron, ondansetron, polyethylene glycol, prochlorperazine, senna-docusate 8.6-50 mg, simethicone, sodium chloride 0.9%, traMADoL       Assessment/Plan:  Recurrent left-sided exudative pleural effusion  Left lower lobe community-acquired pneumonia-treated   Acute hypoxemic respiratory failure : resolved  Hyponatremia      History of:  Chronic migraine, chronic back pain status post stimulator, HTN, 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 No fever or chills  Lung exam was unremarkable   Completed antibiotics   Cont PT    F/U by pulmonary team     Labs in am     VTE prophylaxis: Lovenox     Patient condition:  Fair     Anticipated discharge and Disposition:         All diagnosis and differential diagnosis have been reviewed; assessment and plan has been documented; I have personally reviewed the labs and test results that are presently available; I have reviewed the patients medication list; I have reviewed the consulting providers response and recommendations. I have reviewed or attempted to review medical records based upon their availability    All of the patient's questions have been  addressed and answered. Patient's is agreeable to the above stated plan. I will continue to monitor closely and make adjustments to medical management as needed.  _____________________________________________________________________    Nutrition Status:    Radiology:  X-Ray Chest 1 View  Narrative: EXAMINATION:  XR CHEST 1 VIEW    CLINICAL HISTORY:  CT;    TECHNIQUE:  One view    COMPARISON:  January 25, 2023.    FINDINGS:  Cardiopericardial silhouette is within normal limits.  There is no significant change in the size of left pleural and left lower lung zone atelectasis.  Left chest tubes are in similar location.  No acute findings of the right lung and the pleural space.  There is no pneumothorax.  Extensive posterior fusions cervicothoracic vertebrae.  Spinal stimulator electrodes  Impression: No significant interval change.    Electronically signed by: Mikie Sanon  Date:    01/26/2023  Time:    08:35      Romario Cleveland MD   01/26/2023

## 2023-01-27 NOTE — PROGRESS NOTES
[facility] - 5th Floor Med Surg  Pulmonary/Critical Care - Medicine  Progress Note    Patient Name: Purnima Higgins  MRN: [id number]  Admission Date: 1/13/2023  Hospital Length of Stay: 14 days  Code Status: Full Code  Attending Provider: Romario Cleveland MD  Primary Care Provider: Jos Briseno MD     Subjective:     HPI:   This is an 82-year-old female with a history of HTN, HLD, back pain with implanted stimulator who has been experiencing a cough x 3 weeks. She failed outpatient treatment by PCP having received doxycycline, prednisone and Levaquin. She was then referred to the ED for further evaluation on 1/13/23. She also reported new onset swelling in her lower extremities data to presentation. CXR in ER revealed a small left pleural effusion, unchanged from previous imaging. CT of chest redemonstrated left pleural effusion. She was admitted to hospital medicine and pulmonary was consulted for evaluation of pleural effusion.  She underwent left thoracentesis on 01/16/23, removing ~300cc of thin, serosanguinous fluid.  Transthoracic echocardiogram 01/17/2023 with normal LVSF, EF 60%, grade 1 diastolic dysfunction.  There is a small mobile echogenic structure on the aortic valve with some calcification.  THEODORA was performed 01/19/23,  demonstrating no evidence of aortic valve vegetation with some aortic valvular calcification noted.  CV surgery performed left VATS with talc pleurodesis on 01/24/2023, noting no evidence of tumor grossly.  Intraoperative cytology from fluid again returned with no evidence of malignancy.    Interval History/Significant Events:   All cultures continue no growth.  She is sitting up out of bed in chair.  Chest tube was pulled this AM. Patient just walked with PT on room air and oxygen saturation did not drop lower than 89%. She remains on room air.    Scheduled Medications:   amLODIPine  10 mg Oral Daily    benzonatate  100 mg Oral TID    calcium-vitamin D3  1 tablet  Oral Daily    cholestyramine  1 packet Oral BID    enoxaparin  40 mg Subcutaneous Daily    Lactobacillus acidophilus  1 capsule Oral Daily    metoprolol tartrate  25 mg Oral BID    multivitamin  1 tablet Oral Daily    mupirocin  1 g Nasal BID    ondansetron  4 mg Intravenous Once    umeclidinium-vilanteroL  1 puff Inhalation Daily    ursodioL  500 mg Oral BID    valsartan  160 mg Oral Daily    zinc oxide-cod liver oil   Topical (Top) BID     PRN Medications:  albuterol-ipratropium, albuterol-ipratropium, aluminum-magnesium hydroxide-simethicone, amitriptyline, dextromethorphan-guaiFENesin  mg/5 ml, diphenhydrAMINE, HYDROcodone-acetaminophen, HYDROmorphone, HYDROmorphone, melatonin, metoclopramide HCl, miconazole NITRATE 2 %, ondansetron, ondansetron, polyethylene glycol, prochlorperazine, senna-docusate 8.6-50 mg, simethicone, sodium chloride 0.9%, traMADoL  Continuous Infusions:      Past Medical History:   Diagnosis Date    Arthritis     Back pain     scs implant- Dr ritter    Dyslipidemia     Hypertension     Liver disease     Spinal cord disorder        Past Surgical History:   Procedure Laterality Date    APPENDECTOMY      BACK SURGERY       SECTION      CHOLECYSTECTOMY      LAPAROSCOPIC REPAIR OF INCISIONAL HERNIA N/A 6/15/2022    Procedure: REPAIR, HERNIA, INCISIONAL, LAPAROSCOPIC;  Surgeon: Nayan Gonzalez MD;  Location: [facility];  Service: General;  Laterality: N/A;    NECK SURGERY      PLEURODESIS USING TALC Left 2023    Procedure: PLEURODESIS, USING TALC;  Surgeon: Augie Mott MD;  Location: [facility] OR;  Service: Cardiothoracic;  Laterality: Left;    SHOULDER SURGERY Bilateral     TOTAL KNEE ARTHROPLASTY      VIDEO-ASSISTED THORACOSCOPIC SURGERY (VATS) Left 2023    Procedure: VATS (VIDEO-ASSISTED THORACOSCOPIC SURGERY);  Surgeon: Augie Mott MD;  Location: [facility] OR;  Service: Cardiothoracic;  Laterality: Left;  LEFT       Objective:     Input/output:    Intake/Output Summary (Last  24 hours) at 1/27/2023 0834  Last data filed at 1/27/2023 0500  Gross per 24 hour   Intake 1580 ml   Output 1260 ml   Net 320 ml         Vital Signs (Most Recent):  Temp: 98.1 °F (36.7 °C) (01/27/23 0743)  Pulse: 72 (01/27/23 0743)  Resp: 20 (01/27/23 0743)  BP: 131/80 (01/27/23 0743)  SpO2: (!) 94 % (01/27/23 0743)    Body mass index is 35.99 kg/m².  Weight: 78.1 kg (172 lb 2.9 oz) Vital Signs (24h Range):  Temp:  [97.7 °F (36.5 °C)-98.6 °F (37 °C)] 98.1 °F (36.7 °C)  Pulse:  [68-77] 72  Resp:  [17-20] 20  SpO2:  [93 %-96 %] 94 %  BP: (121-155)/(61-80) 131/80     Physical Exam  Constitutional:       Appearance: She is obese.   HENT:      Mouth/Throat:      Mouth: Mucous membranes are moist.      Pharynx: Oropharynx is clear.   Eyes:      Conjunctiva/sclera: Conjunctivae normal.      Pupils: Pupils are equal, round, and reactive to light.   Cardiovascular:      Rate and Rhythm: Normal rate and regular rhythm.   Pulmonary:      Breath sounds: Rhonchi (Few bilateral coarse rhonchi posterior bases) present. No wheezing or rales.   Abdominal:      General: Bowel sounds are normal.      Palpations: Abdomen is soft.   Musculoskeletal:      Right lower leg: No edema.      Left lower leg: No edema.   Lymphadenopathy:      Cervical: No cervical adenopathy.   Skin:     General: Skin is warm and dry.   Neurological:      Mental Status: She is alert and oriented to person, place, and time.       Lines/Drains/Airways       Peripheral Intravenous Line  Duration                  Peripheral IV - Single Lumen 01/26/23 2200 20 G Left;Posterior Wrist <1 day                  Significant Labs:    Lab Results   Component Value Date    WBC 12.7 (H) 01/27/2023    HGB 10.1 (L) 01/27/2023    HCT 30.1 (L) 01/27/2023    MCV 90.1 01/27/2023     01/27/2023         Recent Labs   Lab 01/27/23  0339   *   K 4.5   CO2 22*   BUN 12.1   CREATININE 0.56   CALCIUM 8.4         Imaging:  Chest x-ray (01/27/2023) - no significant  change    Assessment/Plan:     Neutrophil predominant serosanguineous exudative left pleural effusion, all cytology negative for malignancy.  No culture growth.  She is postoperative right VATS with talc pleurodesis on 01/19/2023.  Clinical picture most consistent with a chronic pleuritis.  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 Currently on room air and doing well.   Continue mobilization up out of bed as tolerated and encouraged IS       DOUGIE Velez  Pulmonary/Critical Care  [facility] - 5th Floor Med Surg

## 2023-01-27 NOTE — PT/OT/SLP PROGRESS
Physical Therapy  Treatment    Purnima Higgins   MRN: [id number]   Admitting Diagnosis: Pleural effusion on left       PT Start Time: 0800     PT Stop Time: 0830    PT Total Time (min): 30 min       Billable Minutes:  Gait Training 15 and Therapeutic Exercise 15    Treatment Type: Treatment  PT/PTA: PTA     PTA Visit Number: 1       General Precautions: Standard, fall  Orthopedic Precautions: N/A  Braces: N/A  Respiratory Status: RA    Spiritual, Cultural Beliefs, Muslim Practices, Values that Affect Care: no    Subjective:  Communicated with NSG prior to session.         Objective:   Patient found with: chest tube    Functional Mobility:  Bed Mobility:    Supine to sit. Min A to come EOB sitting.     Transfers:   Sit to/from stand. Min A to come to standing    Gait:    Pt ambulated ~180ft on RA. Slow but steady step through gait pattern. Decreased step length and evangelist. No unsteadiness or LOB. RW. Cues for upright posture. RW CGA O2 sats 89% at the end of gait but recover with a seated rest break within seconds.     Pt completed x 15 reps of therex   AM-PAC 6 CLICK MOBILITY  How much help from another person does this patient currently need?   1 = Unable, Total/Dependent Assistance  2 = A lot, Maximum/Moderate Assistance  3 = A little, Minimum/Contact Guard/Supervision  4 = None, Modified [place]/Independent         AM-PAC Raw Score CMS G-Code Modifier Level of Impairment Assistance   6 % Total / Unable   7 - 9 CM 80 - 100% Maximal Assist   10 - 14 CL 60 - 80% Moderate Assist   15 - 19 CK 40 - 60% Moderate Assist   20 - 22 CJ 20 - 40% Minimal Assist   23 CI 1-20% SBA / CGA   24 CH 0% Independent/ Mod I     Patient left up in chair with all lines intact and call button in reach.    Assessment:  Purnima Higgins is a 82 y.o. female with a medical diagnosis of Pleural effusion on left     Rehab identified problem list/impairments: weakness, impaired endurance, impaired self care skills, impaired  functional mobility    Rehab potential is good.    Activity tolerance: good    Discharge recommendations: home, home with home health, home health PT      Barriers to discharge:      Equipment recommendations: walker, rolling     GOALS:   Multidisciplinary Problems       Physical Therapy Goals          Problem: Physical Therapy    Goal Priority Disciplines Outcome Goal Variances Interventions   Physical Therapy Goal     PT, PT/OT Ongoing, Progressing     Description: Goals to be met by: 2023     Patient will increase functional independence with mobility by performin. Supine to sit with [place]  2. Sit to stand transfer with Modified [place]  3. Gait  x 200 feet with Modified [place] using Rolling Walker.                          PLAN:    Patient to be seen 6 x/week to address the above listed problems via gait training, therapeutic activities, therapeutic exercises  Plan of Care expires: 23  Plan of Care reviewed with: patient, spouse         2023

## 2023-01-29 LAB — BACTERIA FLD CULT: NORMAL

## 2023-01-30 ENCOUNTER — PATIENT OUTREACH (OUTPATIENT)
Dept: ADMINISTRATIVE | Facility: CLINIC | Age: 83
End: 2023-01-30
Payer: MEDICARE

## 2023-01-30 NOTE — PROGRESS NOTES
C3 nurse spoke with Purnima Higgins for a TCC post hospital discharge follow up call. The patient has a scheduled HOS appointment with Jos Briseno MD on 2/3/23 @ 11:15 and .Augie Mott MD(cardiothoracic surgeon) on 2/20/23 @ 9:00am.

## 2023-02-01 NOTE — PHYSICIAN QUERY
PT Name: Purnima Higgins                   speaking with  re the query and pulling until then   MR #: [id number]     DOCUMENTATION CLARIFICATION      CDS/: Jocelyn Magaña               Contact information:  This form is a permanent document in the medical record.     Query Date: February 1, 2023    By submitting this query, we are merely seeking further clarification of documentation.  Please utilize your independent clinical judgment when addressing the question(s) below.     The Medical Record contains the following:    Clinical Information Location in Medical Record     This is an 82-year-old female medical history as detailed below presented to the ED with complaint of persistent cough and shortness breath and outpatient chest x-ray showing left  lower lobe pneumonia/pleural effusion.     Denies  weight loss or night sweats.      Left-sided pleural effusion consistent with exudative effusion.  Cytology negative for malignancy and cultures negative.  Patient was initiated on Zosyn on admit which was  being continued.  Patient remains hemodynamically stable.  No leukocytosis and afebrile.     I have discussed the above findings in detail with patient.  I am concerned that the serosanguineous recurrent pleural effusion is representative of an as of yet undiagnosed malignancy.  I have discussed options of repeating thoracentesis versus proceeding  to VATS.  Patient is in agreement with proceeding to VATS at this point.     CV surgery performed left VATS with talc pleurodesis on 01/24/2023, noting no evidence of tumor grossly.  Intraoperative cytology from fluid again returned with no evidence of malignancy.    Zosyn IV x 15 doses   Hospital medicine PN 1/18        Hospital medicine PN 1/18        Pulmonology  PN 1/19          Pulmonology PN 1/20            Pulmonology PN 1/27        MAR     Please document your best medical opinion regarding the etiology of __Pleural Effusion _____?       [   ]    [   ]  Other etiology (please specify):___________________   [  ] Clinically Undetermined       Present on admission (POA) status:   [   ] Yes (Y)                          [  ] Clinically Undetermined (W)  [   ] No (N)                            [   ] Documentation insufficient to determine if condition is POA (U)         Please document in your progress notes daily for the duration of treatment, until resolved, and include in your discharge summary.

## 2023-02-08 ENCOUNTER — TELEPHONE (OUTPATIENT)
Dept: CARDIAC SURGERY | Facility: CLINIC | Age: 83
End: 2023-02-08
Payer: MEDICARE

## 2023-02-08 NOTE — TELEPHONE ENCOUNTER
Returned call to pt. She is S/P L VATS 1/24/23. Pt states she had appt w/ Dr. Galvan today and was told to f/u with our office re: some slight redness to one of her incisions. Reports no fever, no drainage or foul odor. Call placed to [facility] and spoke to Noemi. Nurse visit is scheduled for tomorrow. Nurse will assess the wound and report back to our office with findings. TIM Morales RN

## 2023-02-09 ENCOUNTER — TELEPHONE (OUTPATIENT)
Dept: CARDIAC SURGERY | Facility: CLINIC | Age: 83
End: 2023-02-09
Payer: MEDICARE

## 2023-02-09 NOTE — TELEPHONE ENCOUNTER
Inst to keep clean with soap and water, may continue to cover with light gauze dressing and tape for drainage.  Report any s/sx of infection back to office.  Verb understanding.   ----- Message from Nunu Fuller sent at 2/9/2023  1:02 PM CST -----  Alana fr 1st option called she cleaned wound, has little drainage, covered with gauze and tape. Wants to know how long to keep it covered.     Alana [phone]

## 2023-02-16 DIAGNOSIS — J90 PLEURAL EFFUSION ON LEFT: Primary | ICD-10-CM

## 2023-02-20 LAB — FUNGUS SPEC CULT: NORMAL

## 2023-02-27 ENCOUNTER — HOSPITAL ENCOUNTER (OUTPATIENT)
Dept: RADIOLOGY | Facility: HOSPITAL | Age: 83
Discharge: HOME | End: 2023-02-27
Attending: THORACIC SURGERY (CARDIOTHORACIC VASCULAR SURGERY)
Payer: MEDICARE

## 2023-02-27 ENCOUNTER — OFFICE VISIT (OUTPATIENT)
Dept: CARDIAC SURGERY | Facility: CLINIC | Age: 83
End: 2023-02-27
Payer: MEDICARE

## 2023-02-27 VITALS
DIASTOLIC BLOOD PRESSURE: 75 MMHG | HEIGHT: 56 IN | SYSTOLIC BLOOD PRESSURE: 144 MMHG | HEART RATE: 60 BPM | WEIGHT: 163 LBS | BODY MASS INDEX: 36.67 KG/M2

## 2023-02-27 DIAGNOSIS — J90 PLEURAL EFFUSION ON LEFT: ICD-10-CM

## 2023-02-27 LAB — FUNGUS SPEC CULT: NORMAL

## 2023-02-27 PROCEDURE — 99024 POSTOP FOLLOW-UP VISIT: CPT | Mod: ,,, | Performed by: THORACIC SURGERY (CARDIOTHORACIC VASCULAR SURGERY)

## 2023-02-27 PROCEDURE — 99024 PR POST-OP FOLLOW-UP VISIT: ICD-10-PCS | Mod: ,,, | Performed by: THORACIC SURGERY (CARDIOTHORACIC VASCULAR SURGERY)

## 2023-02-27 PROCEDURE — 71046 X-RAY EXAM CHEST 2 VIEWS: CPT | Mod: TC

## 2023-02-27 NOTE — PROGRESS NOTES
Patient is status post left VATS.  She is  doing well without complaints   Vital signs stable/afebrile   Regular rate and rhythm without murmurs rubs or gallops  Coarse breath sounds bilaterally .  No evidence of dullness or decreased breath sounds  Wounds CDI   A/P:  Doing well without complaints.  Chest x-ray pending

## 2023-03-01 PROBLEM — G95.9 DISEASE OF SPINAL CORD, UNSPECIFIED: Status: ACTIVE | Noted: 2023-03-01

## 2023-03-01 PROBLEM — J44.0 CHRONIC OBSTRUCTIVE PULMONARY DISEASE WITH (ACUTE) LOWER RESPIRATORY INFECTION: Status: ACTIVE | Noted: 2023-03-01

## 2023-03-01 PROBLEM — I70.0 AORTIC ATHEROSCLEROSIS: Status: ACTIVE | Noted: 2023-03-01

## 2023-03-01 PROBLEM — M81.0 AGE-RELATED OSTEOPOROSIS WITHOUT CURRENT PATHOLOGICAL FRACTURE: Status: ACTIVE | Noted: 2023-03-01

## 2023-03-01 PROBLEM — I70.0 AORTIC ATHEROSCLEROSIS: Status: RESOLVED | Noted: 2023-03-01 | Resolved: 2023-03-01

## 2023-03-01 PROBLEM — I11.0 HYPERTENSIVE HEART DISEASE WITH HEART FAILURE: Status: ACTIVE | Noted: 2023-03-01

## 2023-03-01 PROBLEM — K74.3 PRIMARY BILIARY CIRRHOSIS: Status: RESOLVED | Noted: 2022-08-29 | Resolved: 2023-03-01

## 2023-03-01 PROBLEM — J44.0 CHRONIC OBSTRUCTIVE PULMONARY DISEASE WITH (ACUTE) LOWER RESPIRATORY INFECTION: Status: RESOLVED | Noted: 2023-03-01 | Resolved: 2023-03-01

## 2023-03-01 LAB — MYCOBACTERIUM SPEC QL CULT: NORMAL

## 2023-03-30 PROBLEM — F32.A MILD DEPRESSION: Status: ACTIVE | Noted: 2023-03-30

## 2023-03-30 PROBLEM — M51.369 DDD (DEGENERATIVE DISC DISEASE), LUMBAR: Status: ACTIVE | Noted: 2023-03-30

## 2023-03-30 PROBLEM — M85.80 OSTEOPENIA: Status: RESOLVED | Noted: 2022-12-01 | Resolved: 2023-03-30

## 2023-03-30 PROBLEM — M81.0 AGE-RELATED OSTEOPOROSIS WITHOUT CURRENT PATHOLOGICAL FRACTURE: Status: RESOLVED | Noted: 2023-03-01 | Resolved: 2023-03-30

## 2023-03-30 PROBLEM — E78.00 HYPERCHOLESTEROLEMIA: Status: ACTIVE | Noted: 2023-03-30

## 2023-03-30 PROBLEM — M51.36 DDD (DEGENERATIVE DISC DISEASE), LUMBAR: Status: ACTIVE | Noted: 2023-03-30

## 2023-03-30 PROCEDURE — 87184 SC STD DISK METHOD PER PLATE: CPT | Performed by: NURSE PRACTITIONER

## 2023-08-16 ENCOUNTER — HOSPITAL ENCOUNTER (OUTPATIENT)
Dept: RADIOLOGY | Facility: HOSPITAL | Age: 83
Discharge: HOME | End: 2023-08-16
Attending: NEUROLOGICAL SURGERY
Payer: MEDICARE

## 2023-08-16 DIAGNOSIS — M48.02 SPINAL STENOSIS IN CERVICAL REGION: ICD-10-CM

## 2023-08-16 PROCEDURE — 72082 X-RAY EXAM ENTIRE SPI 2/3 VW: CPT | Mod: TC

## 2024-03-12 PROBLEM — K74.60 HEPATIC CIRRHOSIS, UNSPECIFIED HEPATIC CIRRHOSIS TYPE, UNSPECIFIED WHETHER ASCITES PRESENT: Status: ACTIVE | Noted: 2024-03-12

## 2024-03-12 PROBLEM — Z93.8 S/P CHEST TUBE PLACEMENT: Status: ACTIVE | Noted: 2024-03-12

## 2025-04-15 PROBLEM — M48.02 CERVICAL STENOSIS OF SPINE: Status: ACTIVE | Noted: 2025-04-15

## 2025-04-15 PROBLEM — I10 HTN (HYPERTENSION): Status: RESOLVED | Noted: 2022-09-27 | Resolved: 2025-04-15

## 2025-04-15 PROBLEM — E78.2 MIXED HYPERLIPIDEMIA: Status: ACTIVE | Noted: 2023-03-30

## 2025-05-22 DIAGNOSIS — R94.5 ABNORMAL LIVER FUNCTION: ICD-10-CM

## 2025-05-22 DIAGNOSIS — K74.3 PRIMARY BILIARY CIRRHOSIS: Primary | ICD-10-CM

## 2025-05-27 ENCOUNTER — HOSPITAL ENCOUNTER (OUTPATIENT)
Dept: RADIOLOGY | Facility: HOSPITAL | Age: 85
Discharge: HOME | End: 2025-05-27
Attending: PHYSICIAN ASSISTANT
Payer: MEDICARE

## 2025-05-27 DIAGNOSIS — K74.3 PRIMARY BILIARY CIRRHOSIS: ICD-10-CM

## 2025-05-27 DIAGNOSIS — R94.5 ABNORMAL LIVER FUNCTION: ICD-10-CM

## 2025-05-27 PROCEDURE — 76705 ECHO EXAM OF ABDOMEN: CPT | Mod: TC

## (undated) DEVICE — DISSECTOR 5MM ENDOPATH

## (undated) DEVICE — CATH ALL PUR URTHL 20FR

## (undated) DEVICE — SUT VICRYL PLUS 4-0 FS-2 27IN

## (undated) DEVICE — SUT 2-0 VICRYL / CT-1

## (undated) DEVICE — GLOVE SURG BIOGEL LATEX SZ 7.5

## (undated) DEVICE — SKINMARKER & RULER REGULAR X-F

## (undated) DEVICE — SYR ONLY LUER LOCK 20CC

## (undated) DEVICE — SEALANT VISTASEAL FIBRIN 10ML

## (undated) DEVICE — SHEARS HARMONIC 5CM 36CM

## (undated) DEVICE — CONTAINER SPECIMEN SCREW 4OZ

## (undated) DEVICE — TROCAR ENDOPATH EXCEL DILATING

## (undated) DEVICE — SOL NORMAL USPCA 0.9%

## (undated) DEVICE — BINDER ABDOMINAL UNIV XLN 10IN

## (undated) DEVICE — ELECTRODE PATIENT RETURN DISP

## (undated) DEVICE — SODIUM CHLORIDE 0.9% 1000ML

## (undated) DEVICE — GLOVE PROTEXIS PI SYN SURG 6.5

## (undated) DEVICE — TRAP MUCOUS SPECIMEN 80CCBY BU

## (undated) DEVICE — CANNULA ENDOPATH XCEL 5X100MM

## (undated) DEVICE — SCISSOR CURVED ENDOPATH 5MM

## (undated) DEVICE — CATH THOR STND RGHT ANG 32FR

## (undated) DEVICE — PAD ABD 8X10 STERILE

## (undated) DEVICE — HEMOSTAT SURGICEL FIBRLR 2X4IN

## (undated) DEVICE — SOL CLEARIFY VISUALIZATION LAP

## (undated) DEVICE — TROCAR THORACIC 15MM W/OBT & S

## (undated) DEVICE — BLADE SURG STAINLESS STEEL #11

## (undated) DEVICE — DRAPE INCISE IOBAN 2 23X23IN

## (undated) DEVICE — BENZOIN TINCTURE CAPSULET

## (undated) DEVICE — BAG SPEC RETRV ENDO 4X6IN DISP

## (undated) DEVICE — CORD LAP 10 DISP

## (undated) DEVICE — Device

## (undated) DEVICE — GAUZE SPONGE 4X4 12PLY

## (undated) DEVICE — SUT VICRYL 3-0 27 SH

## (undated) DEVICE — STAPLER INT HERNIA PROTACK 5MM

## (undated) DEVICE — TROCAR ENDOPATH XCEL 5X100MM

## (undated) DEVICE — KIT SURGICAL TURNOVER

## (undated) DEVICE — TUBE SUCTION MEDI-VAC STERILE

## (undated) DEVICE — DRESSING TELFA + BARR 4X6IN

## (undated) DEVICE — NDL GRANEE OPEN LOOP GRASPER

## (undated) DEVICE — CONNECTOR Y STRL 3/8X3/8X3/8IN

## (undated) DEVICE — PAD DERMAPROX XL 22X14X.5IN

## (undated) DEVICE — SUT 2/0 30IN PROLENE MONO

## (undated) DEVICE — GLOVE PROTEXIS PI SYN SURG 6.0

## (undated) DEVICE — TRAY CATH FOL SIL URIMTR 16FR

## (undated) DEVICE — TUBING LAPARSCOPIC INSUFFLATIN

## (undated) DEVICE — DRESSING ADHESIVE ISLAND 3 X 6

## (undated) DEVICE — IRRIGATOR HYDRO-SURG PLUS 5MM

## (undated) DEVICE — GLOVE BIOGEL 7.5

## (undated) DEVICE — SUT SILK 2.0 BLK 18

## (undated) DEVICE — APPLICATOR VISTASEAL RIG 35CM

## (undated) DEVICE — GOWN SMARTSLEEVE AAMI LVL4 XXL

## (undated) DEVICE — DRAPE STERI LONG

## (undated) DEVICE — DRAPE FLUID WARMER 44X44IN

## (undated) DEVICE — SUT 3-0 MONOCRYL PLUS PS-2

## (undated) DEVICE — ELECTRODE BLADE INSULATED 1 IN

## (undated) DEVICE — SPONGE KERLIX ANTIMIC 6X6.75IN

## (undated) DEVICE — GLOVE PROTEXIS PI SYN SURG 7.5

## (undated) DEVICE — DRESSING N ADH OIL EMUL 3X3

## (undated) DEVICE — SUT 4/0 18IN SILK BLK BRAI

## (undated) DEVICE — CUSHION  WC FOAM 20X20X.75IN

## (undated) DEVICE — TROCAR ENDOPATH XCEL 11MM 10CM

## (undated) DEVICE — GLOVE PROTEXIS BLUE LATEX 7

## (undated) DEVICE — NDL SPINAL 20GX3.5 HUB

## (undated) DEVICE — DRAIN CHEST DRY SUCTION

## (undated) DEVICE — KIT ANTIFOG W/SPONG & FLUID

## (undated) DEVICE — YANKAUER OPEN TIP W/O VENT

## (undated) DEVICE — SOL IRRI STRL WATER 1000ML

## (undated) DEVICE — CATH THORACIC 32FR ST

## (undated) DEVICE — ELECTRODE UTAHLOOP EXT 10CM

## (undated) DEVICE — SYS HYDRO-SURG IRR SMOKE EVAC